# Patient Record
Sex: FEMALE | Race: BLACK OR AFRICAN AMERICAN | NOT HISPANIC OR LATINO | Employment: UNEMPLOYED | URBAN - METROPOLITAN AREA
[De-identification: names, ages, dates, MRNs, and addresses within clinical notes are randomized per-mention and may not be internally consistent; named-entity substitution may affect disease eponyms.]

---

## 2021-02-10 ENCOUNTER — TELEMEDICINE (OUTPATIENT)
Dept: OBGYN CLINIC | Facility: CLINIC | Age: 26
End: 2021-02-10
Payer: COMMERCIAL

## 2021-02-10 VITALS — WEIGHT: 190 LBS | HEIGHT: 66 IN | BODY MASS INDEX: 30.53 KG/M2

## 2021-02-10 DIAGNOSIS — Z34.02 ENCOUNTER FOR SUPERVISION OF NORMAL FIRST PREGNANCY IN SECOND TRIMESTER: Primary | ICD-10-CM

## 2021-02-10 DIAGNOSIS — Z87.898 HISTORY OF MARIJUANA USE: ICD-10-CM

## 2021-02-10 DIAGNOSIS — Z34.01 ENCOUNTER FOR SUPERVISION OF NORMAL FIRST PREGNANCY IN FIRST TRIMESTER: ICD-10-CM

## 2021-02-10 PROCEDURE — T1001 NURSING ASSESSMENT/EVALUATN: HCPCS | Performed by: NURSE PRACTITIONER

## 2021-02-10 NOTE — PROGRESS NOTES
OB INTAKE INTERVIEW    * Pt presents for OB intake  Patient is a 21 week transfer from [place]  [facility]  Has extended family in area that are supportive  FOB not involved  We have not received any records as of yet  Patient will call again  Patient states she has only had ultrasounds up until now  No blood work done during pregnancy due to lack of insurance  Currently in the process of applying for medical assistance  States ultrasounds have been normal  Does believe she had a Pap smear and Gc/Chl cultures  Has not had Level II US  Reports good fetal movement  Denies vaginal bleeding  * Accompanied by: Telephone intake  *  *Hx of  delivery prior to 36 weeks 6 days: No  *Last Menstrual Period: Pt's LMP was: 20    *Estimated date of delivery: 21   * Confirmed by: LMP per patient    *Signs/Symptoms of Pregnancy   *Current pregnancy symptoms: Feeling well  *Constipation :No   *Headaches : No   *Cramping/spotting: No   *PICA cravings :No    *Diabetes- If you answer YES to 1 of the following, please order 1 hour GTT, 50g    *History of GDM: No    *BMI >35: No    *History of PCOS and/or on Metformin: No    *Prior history of LGA/Macrosomia (>9lb):  No      -If you answer YES to 2 or more of the following, please order 1 hour GTT, 50g    *BMI >30: Yes  *First degree relative with type 2 diabetes: No    *AMA with other risk factors: No    *History of CHTN, Hyperlipidemia, Elevated A1c: No    *High risk race (, , ,  or [place]): Yes    *Hypertension- if you answer yes, please order preeclampsia labs including 24 hour urine protein   *Hx of chronic HTN: No   *Hx of gestational HTN: No   *Hx of preeclampsia, eclampsia, or HELLP syndrome: No    *Infection Screening-    *Does the pt have a hx of MRSA?:No    *If yes- please follow MRSA protocol and obtain a nasal swab for MRSA culture   *History of herpes?: No   *Ok for blood transfusion: YES    *Immunizations:   *Influenza vaccine given today: No   *Discussed Tdap vaccine: Yes    *Interview education    *[facility] support center/[facility]  org    *[facility]     *Discussed genetic testing: Yes  Patient has not had any genetic testing done  Referral placed for MFM/Formal US/Level II  *Appointment at [facility] made: Patient to call       *Routine Prenatal lab work ordered:  Yes     *Did patients risk factors prompt additional lab work at this time?: Early 1 hr Glucola, urine drug screen     *Nurse/Family Partnership- pt may qualify: Yes    *4 P's- substance abuse screening      Presently using? No    Past use? Yes, Marijuana prior to knowledge of pregnancy    Partner using? No    Parents/Family using? No    *Details that I feel the provider should be aware of: Late transfer 21 weeks  Has not had any blood work during this pregnancy  Awaiting prenatal records  FOB not involved  PN1 visit scheduled  The patient was oriented to our practice and all questions were answered  [address] [facility] for delivery  This was a telephone intake which lasted approximately 45 minutes  Appt made for MFM and PN1      Interviewed by: Belen Nicole

## 2021-02-17 ENCOUNTER — APPOINTMENT (OUTPATIENT)
Dept: LAB | Facility: HOSPITAL | Age: 26
End: 2021-02-17
Payer: COMMERCIAL

## 2021-02-17 ENCOUNTER — INITIAL PRENATAL (OUTPATIENT)
Dept: OBGYN CLINIC | Facility: CLINIC | Age: 26
End: 2021-02-17

## 2021-02-17 VITALS — BODY MASS INDEX: 37.12 KG/M2 | WEIGHT: 230 LBS | DIASTOLIC BLOOD PRESSURE: 82 MMHG | SYSTOLIC BLOOD PRESSURE: 120 MMHG

## 2021-02-17 DIAGNOSIS — Z87.898 HISTORY OF MARIJUANA USE: ICD-10-CM

## 2021-02-17 DIAGNOSIS — Z34.02 ENCOUNTER FOR SUPERVISION OF NORMAL FIRST PREGNANCY IN SECOND TRIMESTER: ICD-10-CM

## 2021-02-17 DIAGNOSIS — O26.02 EXCESSIVE WEIGHT GAIN DURING PREGNANCY IN SECOND TRIMESTER: ICD-10-CM

## 2021-02-17 DIAGNOSIS — O99.212 OBESITY AFFECTING PREGNANCY IN SECOND TRIMESTER: ICD-10-CM

## 2021-02-17 DIAGNOSIS — Z34.02 ENCOUNTER FOR SUPERVISION OF NORMAL FIRST PREGNANCY IN SECOND TRIMESTER: Primary | ICD-10-CM

## 2021-02-17 DIAGNOSIS — Z11.3 SCREEN FOR STD (SEXUALLY TRANSMITTED DISEASE): ICD-10-CM

## 2021-02-17 LAB
ABO GROUP BLD: NORMAL
AMPHETAMINES SERPL QL SCN: NEGATIVE
BACTERIA UR QL AUTO: ABNORMAL /HPF
BARBITURATES UR QL: NEGATIVE
BASOPHILS # BLD AUTO: 0.05 THOUSANDS/ΜL (ref 0–0.1)
BASOPHILS NFR BLD AUTO: 1 % (ref 0–1)
BENZODIAZ UR QL: NEGATIVE
BILIRUB UR QL STRIP: NEGATIVE
BLD GP AB SCN SERPL QL: NEGATIVE
CLARITY UR: CLEAR
COCAINE UR QL: NEGATIVE
COLOR UR: YELLOW
EOSINOPHIL # BLD AUTO: 0.16 THOUSAND/ΜL (ref 0–0.61)
EOSINOPHIL NFR BLD AUTO: 2 % (ref 0–6)
ERYTHROCYTE [DISTWIDTH] IN BLOOD BY AUTOMATED COUNT: 12.2 % (ref 11.6–15.1)
GLUCOSE 1H P 50 G GLC PO SERPL-MCNC: 85 MG/DL
GLUCOSE UR STRIP-MCNC: NEGATIVE MG/DL
HCT VFR BLD AUTO: 35 % (ref 34.8–46.1)
HGB BLD-MCNC: 11.6 G/DL (ref 11.5–15.4)
HGB UR QL STRIP.AUTO: NEGATIVE
IMM GRANULOCYTES # BLD AUTO: 0.04 THOUSAND/UL (ref 0–0.2)
IMM GRANULOCYTES NFR BLD AUTO: 1 % (ref 0–2)
KETONES UR STRIP-MCNC: NEGATIVE MG/DL
LEUKOCYTE ESTERASE UR QL STRIP: NEGATIVE
LYMPHOCYTES # BLD AUTO: 1.81 THOUSANDS/ΜL (ref 0.6–4.47)
LYMPHOCYTES NFR BLD AUTO: 23 % (ref 14–44)
MCH RBC QN AUTO: 29.4 PG (ref 26.8–34.3)
MCHC RBC AUTO-ENTMCNC: 33.1 G/DL (ref 31.4–37.4)
MCV RBC AUTO: 89 FL (ref 82–98)
METHADONE UR QL: NEGATIVE
MONOCYTES # BLD AUTO: 0.63 THOUSAND/ΜL (ref 0.17–1.22)
MONOCYTES NFR BLD AUTO: 8 % (ref 4–12)
NEUTROPHILS # BLD AUTO: 5.33 THOUSANDS/ΜL (ref 1.85–7.62)
NEUTS SEG NFR BLD AUTO: 65 % (ref 43–75)
NITRITE UR QL STRIP: NEGATIVE
NON-SQ EPI CELLS URNS QL MICRO: ABNORMAL /HPF
NRBC BLD AUTO-RTO: 0 /100 WBCS
OPIATES UR QL SCN: NEGATIVE
OXYCODONE+OXYMORPHONE UR QL SCN: NEGATIVE
PCP UR QL: NEGATIVE
PH UR STRIP.AUTO: 6 [PH]
PLATELET # BLD AUTO: 207 THOUSANDS/UL (ref 149–390)
PMV BLD AUTO: 10.7 FL (ref 8.9–12.7)
PROT UR STRIP-MCNC: NEGATIVE MG/DL
RBC # BLD AUTO: 3.94 MILLION/UL (ref 3.81–5.12)
RBC #/AREA URNS AUTO: ABNORMAL /HPF
RH BLD: POSITIVE
SL AMB  POCT GLUCOSE, UA: NORMAL
SL AMB POCT URINE PROTEIN: NORMAL
SP GR UR STRIP.AUTO: 1.02 (ref 1–1.03)
THC UR QL: NEGATIVE
UROBILINOGEN UR QL STRIP.AUTO: 0.2 E.U./DL
WBC # BLD AUTO: 8.02 THOUSAND/UL (ref 4.31–10.16)
WBC #/AREA URNS AUTO: ABNORMAL /HPF

## 2021-02-17 PROCEDURE — 80307 DRUG TEST PRSMV CHEM ANLYZR: CPT

## 2021-02-17 PROCEDURE — PNV: Performed by: NURSE PRACTITIONER

## 2021-02-17 PROCEDURE — 87086 URINE CULTURE/COLONY COUNT: CPT

## 2021-02-17 PROCEDURE — 81001 URINALYSIS AUTO W/SCOPE: CPT

## 2021-02-17 PROCEDURE — 87591 N.GONORRHOEAE DNA AMP PROB: CPT | Performed by: NURSE PRACTITIONER

## 2021-02-17 PROCEDURE — 80081 OBSTETRIC PANEL INC HIV TSTG: CPT

## 2021-02-17 PROCEDURE — 82950 GLUCOSE TEST: CPT

## 2021-02-17 PROCEDURE — 87491 CHLMYD TRACH DNA AMP PROBE: CPT | Performed by: NURSE PRACTITIONER

## 2021-02-17 PROCEDURE — 36415 COLL VENOUS BLD VENIPUNCTURE: CPT

## 2021-02-17 NOTE — PATIENT INSTRUCTIONS
Pregnancy at 23 to 26 Weeks   AMBULATORY CARE:   What changes are happening to your body: You are now close to or at the beginning of the third trimester  The third trimester starts at 24 weeks and ends with delivery  As your baby gets larger, you may develop certain symptoms  These may include pain in your back or down the sides of your abdomen  You may also have stretch marks on your abdomen, breasts, thighs, or buttocks  You may also have constipation  Seek care immediately if:   · You develop a severe headache that does not go away  · You have new or increased vision changes, such as blurred or spotted vision  · You have new or increased swelling in your face or hands  · You have vaginal spotting or bleeding  · Your water broke or you feel warm water gushing or trickling from your vagina  Contact your healthcare provider if:   · You have abdominal cramps, pressure, or tightening  · You have a change in vaginal discharge  · You have light bleeding  · You have chills or a fever  · You have vaginal itching, burning, or pain  · You have yellow, green, white, or foul-smelling vaginal discharge  · You have pain or burning when you urinate, less urine than usual, or pink or bloody urine  · You have questions or concerns about your condition or care  How to care for yourself at this stage of your pregnancy:   · Eat a variety of healthy foods  Healthy foods include fruits, vegetables, whole-grain breads, low-fat dairy foods, beans, lean meats, and fish  Drink liquids as directed  Ask how much liquid to drink each day and which liquids are best for you  Limit caffeine to less than 200 milligrams each day  Limit your intake of fish to 2 servings each week  Choose fish low in mercury such as canned light tuna, shrimp, salmon, cod, or tilapia  Do not  eat fish high in mercury such as swordfish, tilefish, flor mackerel, and shark  · Manage back pain    Do not stand for long periods of time or lift heavy items  Use good posture while you stand, squat, or bend  Wear low-heeled shoes with good support  Rest may also help to relieve back pain  Ask your healthcare provider about exercises you can do to strengthen your back muscles  · Take prenatal vitamins as directed  Your need for certain vitamins and minerals, such as folic acid, increases during pregnancy  Prenatal vitamins provide some of the extra vitamins and minerals you need  Prenatal vitamins may also help to decrease the risk of certain birth defects  · Talk to your healthcare provider about exercise  Moderate exercise can help you stay fit  Your healthcare provider will help you plan an exercise program that is safe for you during pregnancy  · Do not smoke  Smoking increases your risk of a miscarriage and other health problems during your pregnancy  Smoking can cause your baby to be born too early or weigh less at birth  Ask your healthcare provider for information if you need help quitting  · Do not drink alcohol  Alcohol passes from your body to your baby through the placenta  It can affect your baby's brain development and cause fetal alcohol syndrome (FAS)  FAS is a group of conditions that causes mental, behavior, and growth problems  · Talk to your healthcare provider before you take any medicines  Many medicines may harm your baby if you take them when you are pregnant  Do not take any medicines, vitamins, herbs, or supplements without first talking to your healthcare provider  Never use illegal or street drugs (such as marijuana or cocaine) while you are pregnant  Safety tips during pregnancy:   · Avoid hot tubs and saunas  Do not use a hot tub or sauna while you are pregnant, especially during your first trimester  Hot tubs and saunas may raise your baby's temperature and increase the risk of birth defects  · Avoid toxoplasmosis    This is an infection caused by eating raw meat or being around infected cat feces  It can cause birth defects, miscarriages, and other problems  Wash your hands after you touch raw meat  Make sure any meat is well-cooked before you eat it  Avoid raw eggs and unpasteurized milk  Use gloves or ask someone else to clean your cat's litter box while you are pregnant  Changes that are happening with your baby:  By 26 weeks, your baby will weigh about 2 pounds  Your baby will be about 10 inches long from the top of the head to the rump (baby's bottom)  Your baby's movements are much stronger now  Your baby's eyes are almost completely formed and can partially open  Your baby also sleeps and wakes up  What you need to know about prenatal care: Your healthcare provider will check your blood pressure and weight  You may also need the following:  · A urine test  may also be done to check for sugar and protein  These can be signs of gestational diabetes or infection  Protein in your urine may also be a sign of preeclampsia  Preeclampsia is a condition that can develop during week 20 or later of your pregnancy  It causes high blood pressure, and it can cause problems with your kidneys and other organs  · Fundal height  is a measurement of your uterus to check your baby's growth  This number is usually the same as the number of weeks that you have been pregnant  · Your baby's heart rate  will be checked  © Copyright [address] Information is for End User's use only and may not be sold, redistributed or otherwise used for commercial purposes  All illustrations and images included in CareNotes® are the copyrighted property of A D A M , Inc  or [facility] Dorene Burt   The above information is an  only  It is not intended as medical advice for individual conditions or treatments  Talk to your doctor, nurse or pharmacist before following any medical regimen to see if it is safe and effective for you

## 2021-02-17 NOTE — PROGRESS NOTES
Problem List Items Addressed This Visit     None      Visit Diagnoses     Encounter for supervision of normal first pregnancy in second trimester    -  Primary    Relevant Orders    Glucose, 1H PG    RPR    POCT urine dip (Completed)    Chlamydia/GC amplified DNA by PCR    Obesity affecting pregnancy in second trimester        Excessive weight gain during pregnancy in second trimester        Screen for STD (sexually transmitted disease)        Relevant Orders    Chlamydia/GC amplified DNA by PCR        Here for 1st OB appointment accompanied by HER MOM  TRANSFER FROM [place], ONLY A PAP RECEIVED  ANTE LABS AND 28 WK LABS ORDERED  Feels well  Pap 2020 NEG, GC/CT done today  Has appointment for L2 on 2/20  Denies LOF/CTX/VB  No pregnancy concerns expressed by patient  I DID DISCUSS HER TWG AND DIET/EXERCISE  SHE WILL TRY TO BE COGNIZANT  All questions answered  Oriented to our practice  DECLINES A FLU SHOT  BLUE FOLDER GIVEN TO PT AND REVIEWED

## 2021-02-18 LAB
HBV SURFACE AG SER QL: NORMAL
HIV 1+2 AB+HIV1 P24 AG SERPL QL IA: NORMAL
RPR SER QL: NORMAL
RUBV IGG SERPL IA-ACNC: >175 IU/ML

## 2021-02-19 ENCOUNTER — TELEPHONE (OUTPATIENT)
Dept: PERINATAL CARE | Facility: CLINIC | Age: 26
End: 2021-02-19

## 2021-02-19 LAB — BACTERIA UR CULT: NORMAL

## 2021-02-19 NOTE — TELEPHONE ENCOUNTER
Spoke with patient and confirmed appointment with [facility]  1 support person ( must be over age of 15) may accompany patient  Will you and your support person be able to wear a mask ,without a valve , during entire appointment? YES   To minimize your exposure in our waiting area,check in and rooming questions will be done via phone  When you arrive in the parking lot please call the following inside line # prior to entering office:    [facility]: [phone]    Have you or your support person traveled outside the state in the last 2 weeks? NO  If yes, what state did you travel to? n/a    Do you or your support person have:  Fever or flu- like symptoms? NO  Symptoms of upper respiratory infection like runny nose, sore throat or cough? NO  Do you have new headache that you have not had in the past?NO  Have you experienced any new shortness of breath recently? NO  Do you have any new loss of taste or smell? NO  Do you have any new diarrhea, nausea or vomiting? NO  Have you recently been in contact with anyone who has been sick or diagnosed with COVID-19 infection? NO  Have you been recommended to quarantine because of an exposure to a confirmed positive COVID19 person? NO  Have you recently been tested for COVID19? NO    Patient verbalized understanding of all instructions   -------------------------------------------------------------

## 2021-02-20 ENCOUNTER — ROUTINE PRENATAL (OUTPATIENT)
Dept: PERINATAL CARE | Facility: CLINIC | Age: 26
End: 2021-02-20
Payer: COMMERCIAL

## 2021-02-20 VITALS
BODY MASS INDEX: 36.67 KG/M2 | SYSTOLIC BLOOD PRESSURE: 118 MMHG | DIASTOLIC BLOOD PRESSURE: 79 MMHG | WEIGHT: 228.2 LBS | HEIGHT: 66 IN | HEART RATE: 79 BPM

## 2021-02-20 DIAGNOSIS — Z3A.24 24 WEEKS GESTATION OF PREGNANCY: ICD-10-CM

## 2021-02-20 DIAGNOSIS — Z34.02 ENCOUNTER FOR SUPERVISION OF NORMAL FIRST PREGNANCY IN SECOND TRIMESTER: ICD-10-CM

## 2021-02-20 DIAGNOSIS — O99.212 OBESITY COMPLICATING PREGNANCY IN SECOND TRIMESTER: Primary | ICD-10-CM

## 2021-02-20 PROCEDURE — 76811 OB US DETAILED SNGL FETUS: CPT | Performed by: OBSTETRICS & GYNECOLOGY

## 2021-02-20 PROCEDURE — 99241 PR OFFICE CONSULTATION NEW/ESTAB PATIENT 15 MIN: CPT | Performed by: OBSTETRICS & GYNECOLOGY

## 2021-02-20 RX ORDER — ASPIRIN 81 MG/1
162 TABLET, CHEWABLE ORAL DAILY
Qty: 60 TABLET | Refills: 6 | Status: SHIPPED | OUTPATIENT
Start: 2021-02-20 | End: 2021-06-22 | Stop reason: HOSPADM

## 2021-02-20 NOTE — Clinical Note
Patient would like to have cell free DNA screening drawn when she has insurance coverage  She is applying for medical assistance       Katherine Paniagua MD

## 2021-02-20 NOTE — LETTER
2021     [address]  [address] [address]    Patient: Trena Hopkins   YOB: 1995   Date of Visit: 2021       Dear Dr Roxene Goldmann: Thank you for referring Trena Hopkins to me for evaluation  Below are my notes for this consultation  If you have questions, please do not hesitate to call me  I look forward to following your patient along with you  Sincerely,        Lyndsey Carey MD        CC: No Recipients  Lyndsey Carey MD  2021  5:59 PM  Sign when Signing Visit  Goldy Gutierrez Md  [address]  [facility],  [facility]     Dear Dr Roxene Goldmann     Thank you for requesting a  consultation on your patient Trena Hopkins for the following indications:  Fetal anatomy  She started her prenatal care in [place]  Based on records sent she had a 7 week 5 day ultrasound that correlated with her last menstrual period  Her hospital did not send any copies of prenatal labs or genetic screening or a 20 week anatomy scan  She completed prenatal labs on 21 which we reviewed and she had a normal Glucola screen  History  Past medical history:  BMI of 36  Past surgical history:  denies  Past obstetrical history:   1 para 0  Social history:  Does not report use of cigarettes, alcohol or illicit drugs  First generation family history:  Denies hypertension, diabetes, thrombosis or congenital anomalies    Medications:  Prenatal vitamins  Allergies to medications:  denies    Ultrasound findings: her ultrasound today shows a fetus that is growing concordant with her dates  No malformations are detected however we could not complete the anatomical survey secondary to fetal position  The patient was informed of the findings and counseled about the limitations of the exam in detecting all forms of fetal congenital abnormalities      She does not report any vaginal bleeding or uterine cramping/contractions  She does feel fetal movement  BMI above 30 confers increased pregnancy risks  Maternal risks include preeclampsia, gestational diabetes, cardiac dysfunction, and sleep apnea  Fetal risks include miscarriage, fetal anomalies, and stillbirth as well as macrosomia and impaired growth  Intrapartum risks include  delivery, wound complications, and venous thrombosis  The  detection of congenital anomalies is reduced with increasing maternal BMI  Baby aspirin 162 milligrams orally taken till delivery has been associated with a lower risk of developing preeclampsia in pregnancy  We discussed the option of genetic screening which she is interested in but currently does not have insurance  She is applying for medical assistance  At this gestational age the option for genetic screening includes NIPT which can screen for Down syndrome, trisomy 25,  trisomy 15 and sex chromosome issues  Follow up recommended:   Recommend a follow-up ultrasound for growth and missed anatomy at 32 weeks  She is aware to call our office when she gets her insurance coverage and she was given our office phone number  A prescription for baby aspirin 162 milligrams daily was sent to her pharmacy  The majority of time (greater then 50%) was spent on counseling and coordination of care of this patient and /or family member  Approximate face to face time was 15 minutes              Shahla Gates MD

## 2021-02-21 LAB
C TRACH DNA SPEC QL NAA+PROBE: NEGATIVE
N GONORRHOEA DNA SPEC QL NAA+PROBE: NEGATIVE

## 2021-02-23 ENCOUNTER — TELEPHONE (OUTPATIENT)
Dept: OBGYN CLINIC | Age: 26
End: 2021-02-23

## 2021-02-23 NOTE — TELEPHONE ENCOUNTER
----- Message from Kate Recinos, [address] sent at 2/22/2021  9:31 AM EST -----  Please notify patient her GC/CT results were normal  Thanks

## 2021-03-23 NOTE — PATIENT INSTRUCTIONS
Pregnancy at [address] to [address]:   What changes are happening in my body? You are considered full term at the beginning of 37 weeks  Your breathing may be easier if your baby has moved down into a head-down position  You may need to urinate more often because the baby may be pressing on your bladder  You may also feel more discomfort and get tired easily  How do I care for myself at this stage of my pregnancy? · Eat a variety of healthy foods  Healthy foods include fruits, vegetables, whole-grain breads, low-fat dairy foods, beans, lean meats, and fish  Drink liquids as directed  Ask how much liquid to drink each day and which liquids are best for you  Limit caffeine to less than 200 milligrams each day  Limit your intake of fish to 2 servings each week  Choose fish low in mercury such as canned light tuna, shrimp, salmon, cod, or tilapia  Do not  eat fish high in mercury such as swordfish, tilefish, flor mackerel, and shark  · Take prenatal vitamins as directed  Your need for certain vitamins and minerals, such as folic acid, increases during pregnancy  Prenatal vitamins provide some of the extra vitamins and minerals you need  Prenatal vitamins may also help to decrease the risk of certain birth defects  · Rest as needed  Put your feet up if you have swelling in your ankles and feet  · Do not smoke  Smoking increases your risk of a miscarriage and other health problems during your pregnancy  Smoking can cause your baby to be born early or weigh less at birth  Ask your healthcare provider for information if you need help quitting  · Do not drink alcohol  Alcohol passes from your body to your baby through the placenta  It can affect your baby's brain development and cause fetal alcohol syndrome (FAS)  FAS is a group of conditions that causes mental, behavior, and growth problems  · Talk to your healthcare provider before you take any medicines    Many medicines may harm your baby if you take them when you are pregnant  Do not take any medicines, vitamins, herbs, or supplements without first talking to your healthcare provider  Never use illegal or street drugs (such as marijuana or cocaine) while you are pregnant  · Talk to your healthcare provider before you travel  You may not be able to travel in an airplane after 36 weeks  He may also recommend that you avoid long road trips  What are some safety tips during pregnancy? · Avoid hot tubs and saunas  Do not use a hot tub or sauna while you are pregnant, especially during your first trimester  Hot tubs and saunas may raise your baby's temperature and increase the risk of birth defects  · Avoid toxoplasmosis  This is an infection caused by eating raw meat or being around infected cat feces  It can cause birth defects, miscarriages, and other problems  Wash your hands after you touch raw meat  Make sure any meat is well-cooked before you eat it  Avoid raw eggs and unpasteurized milk  Use gloves or ask someone else to clean your cat's litter box while you are pregnant  · Ask your healthcare provider about travel  The most comfortable time to travel is during the second trimester  Ask your healthcare provider if you can travel after 36 weeks  You may not be able to travel in an airplane after 36 weeks  He may also recommend that you avoid long road trips  What changes are happening with my baby? By 38 weeks, your baby may weigh between 6 and 9 pounds  Your baby may be about 14 inches long from the top of the head to the rump (baby's bottom)  Your baby hears well enough to know your voice  As your baby gets larger, you may feel fewer kicks and more stretching and rolling  Your baby may move into a head-down position  Your baby will also rest lower in your abdomen  What do I need to know about prenatal care? Your healthcare provider will check your blood pressure and weight   You may also need the following:  · A urine test  may also be done to check for sugar and protein  These can be signs of gestational diabetes or infection  Protein in your urine may also be a sign of preeclampsia  Preeclampsia is a condition that can develop during week 20 or later of your pregnancy  It causes high blood pressure, and it can cause problems with your kidneys and other organs  · A blood test  may be done to check for anemia (low iron level)  · A Tdap vaccine  may be recommended by your healthcare provider  · A group B strep test  is a test that is done to check for group B strep infection  Group B strep is a type of bacteria that may be found in the vagina or rectum  It can be passed to your baby during delivery if you have it  Your healthcare provider will take swab your vagina or rectum and send the sample to the lab for tests  · Fundal height  is a measurement of your uterus to check your baby's growth  This number is usually the same as the number of weeks that you have been pregnant  Your healthcare provider may also check your baby's position  · Your baby's heart rate  will be checked  When should I seek immediate care? · You develop a severe headache that does not go away  · You have new or increased vision changes, such as blurred or spotted vision  · You have new or increased swelling in your face or hands  · You have vaginal spotting or bleeding  · Your water broke or you feel warm water gushing or trickling from your vagina  When should I contact my healthcare provider? · You have more than 5 contractions in 1 hour  · You notice any changes in your baby's movements  · You have abdominal cramps, pressure, or tightening  · You have a change in vaginal discharge  · You have chills or a fever  · You have vaginal itching, burning, or pain  · You have yellow, green, white, or foul-smelling vaginal discharge      · You have pain or burning when you urinate, less urine than usual, or pink or bloody urine  · You have questions or concerns about your condition or care  CARE AGREEMENT:   You have the right to help plan your care  Learn about your health condition and how it may be treated  Discuss treatment options with your healthcare providers to decide what care you want to receive  You always have the right to refuse treatment  The above information is an  only  It is not intended as medical advice for individual conditions or treatments  Talk to your doctor, nurse or pharmacist before following any medical regimen to see if it is safe and effective for you  © Copyright [address] [place] [address] Information is for End User's use only and may not be sold, redistributed or otherwise used for commercial purposes   All illustrations and images included in CareNotes® are the copyrighted property of A D A M , Inc  or [address] [place] [address]

## 2021-03-24 ENCOUNTER — ROUTINE PRENATAL (OUTPATIENT)
Dept: OBGYN CLINIC | Age: 26
End: 2021-03-24
Payer: COMMERCIAL

## 2021-03-24 DIAGNOSIS — Z34.02 ENCOUNTER FOR SUPERVISION OF NORMAL FIRST PREGNANCY IN SECOND TRIMESTER: Primary | ICD-10-CM

## 2021-03-24 DIAGNOSIS — Z23 NEED FOR TDAP VACCINATION: ICD-10-CM

## 2021-03-24 DIAGNOSIS — R21 SKIN RASH: ICD-10-CM

## 2021-03-24 PROCEDURE — 99213 OFFICE O/P EST LOW 20 MIN: CPT | Performed by: NURSE PRACTITIONER

## 2021-03-24 PROCEDURE — 90471 IMMUNIZATION ADMIN: CPT

## 2021-03-24 PROCEDURE — 90715 TDAP VACCINE 7 YRS/> IM: CPT

## 2021-03-24 RX ORDER — NYSTATIN 100000 U/G
CREAM TOPICAL 2 TIMES DAILY
Qty: 30 G | Refills: 2 | Status: SHIPPED | OUTPATIENT
Start: 2021-03-24 | End: 2021-07-12 | Stop reason: ALTCHOICE

## 2021-03-24 NOTE — ASSESSMENT & PLAN NOTE
Return OB  Denies LOF, vag blding, ctxs, cramping and reports good FM  Taking PNVASA daily as prescribed  Pt c/o itchy rash on her chest, spreading more over the past few days  Flat rash  Nystatin ordered  Will need Tdap @ 28 wks/ FLU vaccine/ "Last Weeks of Pregnancy" & Perineal Massage sheets reviewed and given @ 35 weeks  Reviewed PTL precautions, [address] also reviewed

## 2021-03-24 NOTE — PROGRESS NOTES
Need for Tdap vaccination  Discussed with patient and the benefits, contraindications and side effects of the following vaccines:  TDAP  Discussed all components of the vaccine  Encounter for supervision of normal first pregnancy in second trimester  Return OB  Denies LOF, vag blding, ctxs, cramping and reports good FM  Taking PNVASA daily as prescribed  Pt c/o itchy rash on her chest, spreading more over the past few days  Flat rash  Nystatin ordered  Will need Tdap @ 28 wks/ FLU vaccine/ "Last Weeks of Pregnancy" & Perineal Massage sheets reviewed and given @ 35 weeks  Reviewed PTL precautions, [address] also reviewed

## 2021-03-24 NOTE — PROGRESS NOTES
28 wk 4 d   Denies any LOF, VB or CTX  + FM  Baby Boy  Chest itching and dryness  Patient unable to void  T-dap administered on right deltoid, patient tolerated well

## 2021-03-31 ENCOUNTER — TELEMEDICINE (OUTPATIENT)
Dept: OBGYN CLINIC | Age: 26
End: 2021-03-31
Payer: COMMERCIAL

## 2021-03-31 VITALS — WEIGHT: 240 LBS | BODY MASS INDEX: 38.74 KG/M2

## 2021-03-31 DIAGNOSIS — Z34.03 ENCOUNTER FOR SUPERVISION OF NORMAL FIRST PREGNANCY IN THIRD TRIMESTER: Primary | ICD-10-CM

## 2021-03-31 DIAGNOSIS — O26.03 EXCESSIVE WEIGHT GAIN DURING PREGNANCY IN THIRD TRIMESTER: ICD-10-CM

## 2021-03-31 DIAGNOSIS — O99.213 OBESITY AFFECTING PREGNANCY IN THIRD TRIMESTER: ICD-10-CM

## 2021-03-31 DIAGNOSIS — R21 SKIN RASH: ICD-10-CM

## 2021-03-31 PROCEDURE — 99213 OFFICE O/P EST LOW 20 MIN: CPT | Performed by: NURSE PRACTITIONER

## 2021-03-31 NOTE — PATIENT INSTRUCTIONS
Pregnancy at 32 to 30 Weeks   AMBULATORY CARE:   What changes are happening to your body: You may notice new symptoms such as shortness of breath, heartburn, or swelling of your ankles and feet  You may also have trouble sleeping or contractions  Seek care immediately if:   · You develop a severe headache that does not go away  · You have new or increased vision changes, such as blurred or spotted vision  · You have new or increased swelling in your face or hands  · You have vaginal spotting or bleeding  · Your water broke or you feel warm water gushing or trickling from your vagina  Contact your healthcare provider if:   · You have more than 5 contractions in 1 hour  · You notice any changes in your baby's movements  · You have abdominal cramps, pressure, or tightening  · You have a change in vaginal discharge  · You have chills or a fever  · You have vaginal itching, burning, or pain  · You have yellow, green, white, or foul-smelling vaginal discharge  · You have pain or burning when you urinate, less urine than usual, or pink or bloody urine  · You have questions or concerns about your condition or care  How to care for yourself at this stage of your pregnancy:   · Eat a variety of healthy foods  Healthy foods include fruits, vegetables, whole-grain breads, low-fat dairy foods, beans, lean meats, and fish  Drink liquids as directed  Ask how much liquid to drink each day and which liquids are best for you  Limit caffeine to less than 200 milligrams each day  Limit your intake of fish to 2 servings each week  Choose fish low in mercury such as canned light tuna, shrimp, salmon, cod, or tilapia  Do not  eat fish high in mercury such as swordfish, tilefish, lfor mackerel, and shark  · Manage heartburn  by eating 4 or 5 small meals each day instead of large meals  Avoid spicy food  · Manage swelling  by lying down and putting your feet up           · Take prenatal vitamins as directed  Your need for certain vitamins and minerals, such as folic acid, increases during pregnancy  Prenatal vitamins provide some of the extra vitamins and minerals you need  Prenatal vitamins may also help to decrease the risk of certain birth defects  · Talk to your healthcare provider about exercise  Moderate exercise can help you stay fit  Your healthcare provider will help you plan an exercise program that is safe for you during pregnancy  · Do not smoke  Smoking increases your risk of a miscarriage and other health problems during your pregnancy  Smoking can cause your baby to be born too early or weigh less at birth  Ask your healthcare provider for information if you need help quitting  · Do not drink alcohol  Alcohol passes from your body to your baby through the placenta  It can affect your baby's brain development and cause fetal alcohol syndrome (FAS)  FAS is a group of conditions that causes mental, behavior, and growth problems  · Talk to your healthcare provider before you take any medicines  Many medicines may harm your baby if you take them when you are pregnant  Do not take any medicines, vitamins, herbs, or supplements without first talking to your healthcare provider  Never use illegal or street drugs (such as marijuana or cocaine) while you are pregnant  Safety tips during pregnancy:   · Avoid hot tubs and saunas  Do not use a hot tub or sauna while you are pregnant, especially during your first trimester  Hot tubs and saunas may raise your baby's temperature and increase the risk of birth defects  · Avoid toxoplasmosis  This is an infection caused by eating raw meat or being around infected cat feces  It can cause birth defects, miscarriages, and other problems  Wash your hands after you touch raw meat  Make sure any meat is well-cooked before you eat it  Avoid raw eggs and unpasteurized milk   Use gloves or ask someone else to clean your cat's litter box while you are pregnant  Changes that are happening with your baby:  By 30 weeks, your baby may weigh more than 3 pounds  Your baby may be about 11 inches long from the top of the head to the rump (baby's bottom)  Your baby's eyes open and close now  Your baby's kicks and movements are more forceful at this time  What you need to know about prenatal care: Your healthcare provider will check your blood pressure and weight  You may also need the following:  · Blood tests  may be done to check for anemia or blood type  · A urine test  may also be done to check for sugar and protein  These can be signs of gestational diabetes or infection  Protein in your urine may also be a sign of preeclampsia  Preeclampsia is a condition that can develop during week 20 or later of your pregnancy  It causes high blood pressure, and it can cause problems with your kidneys and other organs  · A Tdap vaccine and flu vaccine  may be recommended by your healthcare provider  · A gestational diabetes screen  will be done using an oral glucose tolerance test (OGTT)  An OGTT starts with a blood sugar level check after you have not eaten for 8 hours  You are then given a glucose drink  Your blood sugar level is checked after 1 hour, 2 hours, and sometimes 3 hours  Healthcare providers look at how much your blood sugar level increases from the first check  · Fundal height  is a measurement of your uterus to check your baby's growth  This number is usually the same as the number of weeks that you have been pregnant  Your healthcare provider may also check your baby's position  · Your baby's heart rate  will be checked  © Copyright [address] Information is for End User's use only and may not be sold, redistributed or otherwise used for commercial purposes   All illustrations and images included in CareNotes® are the copyrighted property of A D A M [employer] Inc  or Jenni Matias  The above information is an  only  It is not intended as medical advice for individual conditions or treatments  Talk to your doctor, nurse or pharmacist before following any medical regimen to see if it is safe and effective for you

## 2021-03-31 NOTE — PROGRESS NOTES
Virtual Regular Visit      Assessment/Plan:    Problem List Items Addressed This Visit        Musculoskeletal and Integument    Skin rash      Other Visit Diagnoses     Encounter for supervision of normal first pregnancy in third trimester    -  Primary    Excessive weight gain during pregnancy in third trimester        Obesity affecting pregnancy in third trimester                   Reason for visit is   Chief Complaint   Patient presents with    Virtual Regular Visit        Encounter provider DARSHANA Allen    Provider located at [address] [place] [address] [facility]  [phone]      Recent Visits  No visits were found meeting these conditions  Showing recent visits within past 7 days and meeting all other requirements     Today's Visits  Date Type Provider Dept   21 Telemedicine DARSHANA Allen  Ob/Gyn [facility]   Showing today's visits and meeting all other requirements     Future Appointments  No visits were found meeting these conditions  Showing future appointments within next 150 days and meeting all other requirements        The patient was identified by name and date of birth  Jeannine Mantle was informed that this is a telemedicine visit and that the visit is being conducted through [employer] and patient was informed that this is a secure, HIPAA-compliant platform  She agrees to proceed     Other methods to assure confidentiality were taken , no one in my office with me  No one else was in the room  She acknowledged consent and understanding of privacy and security of the video platform  The patient has agreed to participate and understands they can discontinue the visit at any time  Patient is aware this is a billable service  Mickie Bolaños is a 27 y o  female    Feels well  Denies LOF/CTX/VB  No concerns   Discussed fetal kick counting  FG appt on 4/20  Rash is improving with antifungal cream  Discussed weight gain  Will try walking more   History reviewed  No pertinent past medical history  History reviewed  No pertinent surgical history  Current Outpatient Medications   Medication Sig Dispense Refill    aspirin 81 mg chewable tablet Chew 2 tablets (162 mg total) daily 60 tablet 6    nystatin (MYCOSTATIN) cream Apply topically 2 (two) times a day 30 g 2    Prenatal MV-Min-Fe Fum-FA-DHA (PRENATAL 1 PO) Take 1 tablet by mouth       No current facility-administered medications for this visit  No Known Allergies    Review of Systems   Constitutional: Negative for activity change, chills, fatigue, fever and unexpected weight change  HENT: Negative for mouth sores and trouble swallowing  Respiratory: Negative for shortness of breath  Gastrointestinal: Negative for anal bleeding, blood in stool, constipation and diarrhea  Genitourinary: Negative for difficulty urinating, dysuria, genital sores and hematuria  Neurological: Negative for weakness  Psychiatric/Behavioral: Negative for confusion and self-injury  Video Exam    Vitals:    03/31/21 1229   Weight: 109 kg (240 lb)       Physical Exam  Constitutional:       General: She is not in acute distress  Appearance: She is well-developed  HENT:      Head: Normocephalic  Neck:      Musculoskeletal: Normal range of motion  Pulmonary:      Effort: Pulmonary effort is normal  No respiratory distress  Musculoskeletal: Normal range of motion  Neurological:      Mental Status: She is alert and oriented to person, place, and time  Psychiatric:         Behavior: Behavior normal          Thought Content:  Thought content normal           I spent 10 minutes with patient today in which greater than 50% of the time was spent in counseling/coordination of care regarding OB care      VIRTUAL VISIT DISCLAIMER    Martha Valentino acknowledges that she has consented to an online visit or consultation  She understands that the online visit is based solely on information provided by her, and that, in the absence of a face-to-face physical evaluation by the physician, the diagnosis she receives is both limited and provisional in terms of accuracy and completeness  This is not intended to replace a full medical face-to-face evaluation by the physician  Mitra Celso understands and accepts these terms

## 2021-04-13 ENCOUNTER — ROUTINE PRENATAL (OUTPATIENT)
Dept: OBGYN CLINIC | Facility: CLINIC | Age: 26
End: 2021-04-13
Payer: COMMERCIAL

## 2021-04-13 VITALS — DIASTOLIC BLOOD PRESSURE: 84 MMHG | BODY MASS INDEX: 40.48 KG/M2 | WEIGHT: 250.8 LBS | SYSTOLIC BLOOD PRESSURE: 136 MMHG

## 2021-04-13 DIAGNOSIS — Z34.02 ENCOUNTER FOR SUPERVISION OF NORMAL FIRST PREGNANCY IN SECOND TRIMESTER: ICD-10-CM

## 2021-04-13 DIAGNOSIS — Z34.93 PREGNANCY, OBSTETRICAL CARE, THIRD TRIMESTER: Primary | ICD-10-CM

## 2021-04-13 PROBLEM — R21 SKIN RASH: Status: RESOLVED | Noted: 2021-03-24 | Resolved: 2021-04-13

## 2021-04-13 LAB
SL AMB  POCT GLUCOSE, UA: NORMAL
SL AMB POCT URINE PROTEIN: NORMAL

## 2021-04-13 PROCEDURE — 99213 OFFICE O/P EST LOW 20 MIN: CPT | Performed by: STUDENT IN AN ORGANIZED HEALTH CARE EDUCATION/TRAINING PROGRAM

## 2021-04-13 NOTE — PROGRESS NOTES
Encounter for supervision of normal first pregnancy in second trimester  26 yo  at 31+3 here for routine visit  She is feeling well  No contractions, leaking or bleeding  Good fetal movement  S/p tdap  Having seasonal allergies and meds safe in pregnancy reviewed  Follow up [address] to anatomy   Return in 2 wks  Discussed contraception- no plans for more babies, reviewed prog only for breast feeding and LARCS  Undecided but plans breast and bottle feeding  TWG 60#, discussed goal 11-20  Eating healthy- walking 40 mins a day and encouraged continuing this  Discussed risks of excess wt gain in pregnancy  Will need  testing for BMI >40

## 2021-04-13 NOTE — ASSESSMENT & PLAN NOTE
28 yo  at 31+3 here for routine visit  She is feeling well  No contractions, leaking or bleeding  Good fetal movement  S/p tdap  Having seasonal allergies and meds safe in pregnancy reviewed  Follow up [address] to anatomy   Return in 2 wks  Discussed contraception- no plans for more babies, reviewed prog only for breast feeding and LARCS  Undecided but plans breast and bottle feeding  TWG 60#, discussed goal 11-20  Eating healthy- walking 40 mins a day and encouraged continuing this  Discussed risks of excess wt gain in pregnancy  Will need  testing for BMI >40

## 2021-04-13 NOTE — PROGRESS NOTES
Pt presents for routine prenatal visit  Denies any bleeding, cramping, LOF  +FM   Urine -/-   Up to date on tdap vaccine   28 week labs done

## 2021-04-20 ENCOUNTER — ULTRASOUND (OUTPATIENT)
Dept: PERINATAL CARE | Facility: OTHER | Age: 26
End: 2021-04-20
Payer: COMMERCIAL

## 2021-04-20 VITALS
SYSTOLIC BLOOD PRESSURE: 121 MMHG | WEIGHT: 252 LBS | BODY MASS INDEX: 40.5 KG/M2 | DIASTOLIC BLOOD PRESSURE: 77 MMHG | HEIGHT: 66 IN

## 2021-04-20 DIAGNOSIS — Z3A.32 32 WEEKS GESTATION OF PREGNANCY: ICD-10-CM

## 2021-04-20 DIAGNOSIS — O99.210 OBESITY AFFECTING PREGNANCY, ANTEPARTUM: Primary | ICD-10-CM

## 2021-04-20 PROCEDURE — 99213 OFFICE O/P EST LOW 20 MIN: CPT | Performed by: OBSTETRICS & GYNECOLOGY

## 2021-04-20 PROCEDURE — 76816 OB US FOLLOW-UP PER FETUS: CPT | Performed by: OBSTETRICS & GYNECOLOGY

## 2021-04-20 NOTE — PROGRESS NOTES
The patient was seen today for an ultrasound  Please see ultrasound report (located under Ob Procedures) for additional details  Thank you very much for allowing us to participate in the care of this very nice patient  Should you have any questions, please do not hesitate to contact me  Paulo Santos MD [id number]  Attending Physician, [facility]

## 2021-04-20 NOTE — PATIENT INSTRUCTIONS
Kick Counts in Pregnancy   AMBULATORY CARE:   Kick counts  measure how much your baby is moving in your womb  A kick from your baby can be felt as a twist, turn, swish, roll, or jab  It is common to feel your baby kicking at 26 to 28 weeks of pregnancy  You may feel your baby kick as early as 20 weeks of pregnancy  Seek care immediately if:   · You feel your baby kick less as the day goes on      · You do not feel any kicks in a day  Contact your healthcare provider if:   · You feel a change in the number of kicks or movements of your baby  · You feel fewer than 10 kicks within 2 hours after counting  · You have questions or concerns about your baby's movements  Why measure kick counts:  Your baby's movement may provide information about your baby's health  He may move less, or not at all, if there are problems  He may move less if he does not have enough room to grow in your uterus (womb)  He may also move less if he is not getting enough oxygen or nutrition from the placenta  Tell your healthcare provider as soon as you feel a change in your baby's movements  Problems that are found earlier are easier to treat  When to measure kick counts:   · Measure kick counts at the same time every day  · Measure kick counts when your baby is awake and most active  Your baby may be most active in the evening  · Measure kick counts after a meal or snack or when your baby is usually most active  Your baby may be more active after you eat or in the evening  · You should not smoke while you are pregnant  Smoking increases the risk of health problems for you and for your baby during your pregnancy  If you do smoke, wait 2 hours to measure kick counts  Nicotine can make your baby more active than usual   How to measure kick counts:  Check that your baby is awake before you measure kick counts  You can wake up your baby by lightly pushing on your belly, walking, or drinking something cold   Your healthcare provider may tell you different ways to measure kick counts  He/She may tell you to do the following:  · Use a chart or clock to keep track of the time you start and finish counting  · Sit in a chair or lie on your left side  · Place your hands on the largest part of your belly  · Count until you reach 10 kicks  Write down how much time it takes to count 10 kicks  · It may take 30 minutes to 2 hours to count 10 kicks  It should not take more than 2 hours to count 10 kicks  If you do not feel 10 kicks within 2 hours, Call your Obstetrician    Follow up with your healthcare provider as directed:  Write down your questions so you remember to ask them during your visits  © 2017 [address] Information is for End User's use only and may not be sold, redistributed or otherwise used for commercial purposes  All illustrations and images included in CareNotes® are the copyrighted property of A D A M , Inc  or Travon Luis  The above information is an  only  It is not intended as medical advice for individual conditions or treatments  Talk to your doctor, nurse or pharmacist before following any medical regimen to see if it is safe and effective for you

## 2021-04-26 ENCOUNTER — ROUTINE PRENATAL (OUTPATIENT)
Dept: OBGYN CLINIC | Facility: CLINIC | Age: 26
End: 2021-04-26
Payer: COMMERCIAL

## 2021-04-26 VITALS — SYSTOLIC BLOOD PRESSURE: 118 MMHG | WEIGHT: 254 LBS | BODY MASS INDEX: 41 KG/M2 | DIASTOLIC BLOOD PRESSURE: 70 MMHG

## 2021-04-26 DIAGNOSIS — Z34.93 PREGNANCY, OBSTETRICAL CARE, THIRD TRIMESTER: Primary | ICD-10-CM

## 2021-04-26 PROBLEM — Z34.02 ENCOUNTER FOR SUPERVISION OF NORMAL FIRST PREGNANCY IN SECOND TRIMESTER: Status: RESOLVED | Noted: 2021-03-24 | Resolved: 2021-04-26

## 2021-04-26 LAB
SL AMB  POCT GLUCOSE, UA: NORMAL
SL AMB POCT URINE PROTEIN: NORMAL

## 2021-04-26 PROCEDURE — 99213 OFFICE O/P EST LOW 20 MIN: CPT | Performed by: OBSTETRICS & GYNECOLOGY

## 2021-04-26 NOTE — PATIENT INSTRUCTIONS
Pregnancy at 31 to [address]:   What changes are happening in my body? You may continue to have symptoms such as shortness of breath, heartburn, contractions, or swelling of your ankles and feet  You may be gaining about 1 pound a week now  How do I care for myself at this stage of my pregnancy? · Eat a variety of healthy foods  Healthy foods include fruits, vegetables, whole-grain breads, low-fat dairy foods, beans, lean meats, and fish  Drink liquids as directed  Ask how much liquid to drink each day and which liquids are best for you  Limit caffeine to less than 200 milligrams each day  Limit your intake of fish to 2 servings each week  Choose fish low in mercury such as canned light tuna, shrimp, salmon, cod, or tilapia  Do not  eat fish high in mercury such as swordfish, tilefish, flor mackerel, and shark  · Manage heartburn  by eating 4 or 5 small meals each day instead of large meals  Avoid spicy food  · Manage swelling  by lying down and putting your feet up  · Take prenatal vitamins as directed  Your need for certain vitamins and minerals, such as folic acid, increases during pregnancy  Prenatal vitamins provide some of the extra vitamins and minerals you need  Prenatal vitamins may also help to decrease the risk of certain birth defects  · Talk to your healthcare provider about exercise  Moderate exercise can help you stay fit  Your healthcare provider will help you plan an exercise program that is safe for you during pregnancy  · Do not smoke  Smoking increases your risk of a miscarriage and other health problems during your pregnancy  Smoking can cause your baby to be born too early or weigh less at birth  Ask your healthcare provider for information if you need help quitting  · Do not drink alcohol  Alcohol passes from your body to your baby through the placenta   It can affect your baby's brain development and cause fetal alcohol syndrome (FAS)  FAS is a group of conditions that causes mental, behavior, and growth problems  · Talk to your healthcare provider before you take any medicines  Many medicines may harm your baby if you take them when you are pregnant  Do not take any medicines, vitamins, herbs, or supplements without first talking to your healthcare provider  Never use illegal or street drugs (such as marijuana or cocaine) while you are pregnant  What are some safety tips during pregnancy? · Avoid hot tubs and saunas  Do not use a hot tub or sauna while you are pregnant, especially during your first trimester  Hot tubs and saunas may raise your baby's temperature and increase the risk of birth defects  · Avoid toxoplasmosis  This is an infection caused by eating raw meat or being around infected cat feces  It can cause birth defects, miscarriages, and other problems  Wash your hands after you touch raw meat  Make sure any meat is well-cooked before you eat it  Avoid raw eggs and unpasteurized milk  Use gloves or ask someone else to clean your cat's litter box while you are pregnant  What changes are happening with my baby? By 34 weeks, your baby may weigh more than 5 pounds  Your baby will be about 12 ½ inches long from the top of the head to the rump (baby's bottom)  Your baby is gaining about ½ pound a week  Your baby's eyes open and close now  Your baby's kicks and movements are more forceful at this time  What do I need to know about prenatal care? Your healthcare provider will check your blood pressure and weight  You may also need the following:  · A urine test  may also be done to check for sugar and protein  These can be signs of gestational diabetes or infection  Protein in your urine may also be a sign of preeclampsia  Preeclampsia is a condition that can develop during week 20 or later of your pregnancy  It causes high blood pressure, and it can cause problems with your kidneys and other organs       · A Tdap vaccine  may be recommended by your healthcare provider  · Fundal height  is a measurement of your uterus to check your baby's growth  This number is usually the same as the number of weeks that you have been pregnant  Your healthcare provider may also check your baby's position  · Your baby's heart rate  will be checked  When should I seek immediate care? · You develop a severe headache that does not go away  · You have new or increased vision changes, such as blurred or spotted vision  · You have new or increased swelling in your face or hands  · You have vaginal spotting or bleeding  · Your water broke or you feel warm water gushing or trickling from your vagina  When should I contact my healthcare provider? · You have more than 5 contractions in 1 hour  · You notice any changes in your baby's movements  · You have abdominal cramps, pressure, or tightening  · You have a change in vaginal discharge  · You have chills or a fever  · You have vaginal itching, burning, or pain  · You have yellow, green, white, or foul-smelling vaginal discharge  · You have pain or burning when you urinate, less urine than usual, or pink or bloody urine  · You have questions or concerns about your condition or care  CARE AGREEMENT:   You have the right to help plan your care  Learn about your health condition and how it may be treated  Discuss treatment options with your healthcare providers to decide what care you want to receive  You always have the right to refuse treatment  The above information is an  only  It is not intended as medical advice for individual conditions or treatments  Talk to your doctor, nurse or pharmacist before following any medical regimen to see if it is safe and effective for you  © Copyright [address] Information is for End User's use only and may not be sold, redistributed or otherwise used for commercial purposes   All illustrations and images included in CareNotes® are the copyrighted property of A D A M , Inc  or Jenni Matias

## 2021-04-26 NOTE — PROGRESS NOTES
Problem List Items Addressed This Visit        Other    Pregnancy, obstetrical care, third trimester - Primary     33w    Signs of labor and fetal kick counts reviewed    L&D consent signed    Has received tdap         Relevant Orders    POCT urine dip (Completed)

## 2021-04-26 NOTE — PROGRESS NOTES
Patient presents for a routine prenatal visit    33W 2D  Good Fetal Movement  No LOF,bleeding,  or discharge  Patient c/o of some cramping  Urine-Neg  UTD Tdap  Labor and delivery consent reviewed and signed by patient at today's visit

## 2021-05-17 ENCOUNTER — ROUTINE PRENATAL (OUTPATIENT)
Dept: OBGYN CLINIC | Facility: CLINIC | Age: 26
End: 2021-05-17
Payer: COMMERCIAL

## 2021-05-17 ENCOUNTER — APPOINTMENT (OUTPATIENT)
Dept: LAB | Facility: HOSPITAL | Age: 26
End: 2021-05-17
Payer: COMMERCIAL

## 2021-05-17 VITALS — SYSTOLIC BLOOD PRESSURE: 126 MMHG | WEIGHT: 256 LBS | BODY MASS INDEX: 41.32 KG/M2 | DIASTOLIC BLOOD PRESSURE: 88 MMHG

## 2021-05-17 DIAGNOSIS — L29.9 PRURITUS: ICD-10-CM

## 2021-05-17 DIAGNOSIS — O99.213 OBESITY AFFECTING PREGNANCY IN THIRD TRIMESTER: ICD-10-CM

## 2021-05-17 DIAGNOSIS — Z34.93 PREGNANCY, OBSTETRICAL CARE, THIRD TRIMESTER: Primary | ICD-10-CM

## 2021-05-17 DIAGNOSIS — O26.03 EXCESSIVE WEIGHT GAIN DURING PREGNANCY IN THIRD TRIMESTER: ICD-10-CM

## 2021-05-17 PROBLEM — O99.210 OBESITY AFFECTING PREGNANCY, ANTEPARTUM: Status: RESOLVED | Noted: 2021-04-20 | Resolved: 2021-05-17

## 2021-05-17 LAB
ALBUMIN SERPL BCP-MCNC: 2.7 G/DL (ref 3.5–5)
ALP SERPL-CCNC: 117 U/L (ref 46–116)
ALT SERPL W P-5'-P-CCNC: 18 U/L (ref 12–78)
ANION GAP SERPL CALCULATED.3IONS-SCNC: 9 MMOL/L (ref 4–13)
AST SERPL W P-5'-P-CCNC: 19 U/L (ref 5–45)
BILIRUB SERPL-MCNC: 0.19 MG/DL (ref 0.2–1)
BUN SERPL-MCNC: 12 MG/DL (ref 5–25)
CALCIUM ALBUM COR SERPL-MCNC: 9.8 MG/DL (ref 8.3–10.1)
CALCIUM SERPL-MCNC: 8.8 MG/DL (ref 8.3–10.1)
CHLORIDE SERPL-SCNC: 104 MMOL/L (ref 100–108)
CO2 SERPL-SCNC: 25 MMOL/L (ref 21–32)
CREAT SERPL-MCNC: 0.72 MG/DL (ref 0.6–1.3)
GFR SERPL CREATININE-BSD FRML MDRD: 136 ML/MIN/1.73SQ M
GLUCOSE SERPL-MCNC: 84 MG/DL (ref 65–140)
POTASSIUM SERPL-SCNC: 4.1 MMOL/L (ref 3.5–5.3)
PROT SERPL-MCNC: 7.7 G/DL (ref 6.4–8.2)
SL AMB  POCT GLUCOSE, UA: NORMAL
SL AMB POCT URINE PROTEIN: NORMAL
SODIUM SERPL-SCNC: 138 MMOL/L (ref 136–145)

## 2021-05-17 PROCEDURE — 87150 DNA/RNA AMPLIFIED PROBE: CPT | Performed by: NURSE PRACTITIONER

## 2021-05-17 PROCEDURE — 99213 OFFICE O/P EST LOW 20 MIN: CPT | Performed by: NURSE PRACTITIONER

## 2021-05-17 PROCEDURE — 36415 COLL VENOUS BLD VENIPUNCTURE: CPT

## 2021-05-17 PROCEDURE — 80053 COMPREHEN METABOLIC PANEL: CPT

## 2021-05-17 PROCEDURE — 82240 BILE ACIDS CHOLYLGLYCINE: CPT

## 2021-05-17 NOTE — PATIENT INSTRUCTIONS
Pregnancy at 28 to 38 Weeks   AMBULATORY CARE:   What changes are happening to your body: You are considered full term at the beginning of 37 weeks  Your breathing may be easier if your baby has moved down into a head-down position  You may need to urinate more often because the baby may be pressing on your bladder  You may also feel more discomfort and get tired easily  Seek care immediately if:   · You develop a severe headache that does not go away  · You have new or increased vision changes, such as blurred or spotted vision  · You have new or increased swelling in your face or hands  · You have vaginal spotting or bleeding  · Your water broke or you feel warm water gushing or trickling from your vagina  Contact your healthcare provider if:   · You have more than 5 contractions in 1 hour  · You notice any changes in your baby's movements  · You have abdominal cramps, pressure, or tightening  · You have a change in vaginal discharge  · You have chills or a fever  · You have vaginal itching, burning, or pain  · You have yellow, green, white, or foul-smelling vaginal discharge  · You have pain or burning when you urinate, less urine than usual, or pink or bloody urine  · You have questions or concerns about your condition or care  How to care for yourself at this stage of your pregnancy:   · Eat a variety of healthy foods  Healthy foods include fruits, vegetables, whole-grain breads, low-fat dairy foods, beans, lean meats, and fish  Drink liquids as directed  Ask how much liquid to drink each day and which liquids are best for you  Limit caffeine to less than 200 milligrams each day  Limit your intake of fish to 2 servings each week  Choose fish low in mercury such as canned light tuna, shrimp, salmon, cod, or tilapia  Do not  eat fish high in mercury such as swordfish, tilefish, flor mackerel, and shark  · Take prenatal vitamins as directed    Your need for certain vitamins and minerals, such as folic acid, increases during pregnancy  Prenatal vitamins provide some of the extra vitamins and minerals you need  Prenatal vitamins may also help to decrease the risk of certain birth defects  · Rest as needed  Put your feet up if you have swelling in your ankles and feet  · Do not smoke  Smoking increases your risk of a miscarriage and other health problems during your pregnancy  Smoking can cause your baby to be born early or weigh less at birth  Ask your healthcare provider for information if you need help quitting  · Do not drink alcohol  Alcohol passes from your body to your baby through the placenta  It can affect your baby's brain development and cause fetal alcohol syndrome (FAS)  FAS is a group of conditions that causes mental, behavior, and growth problems  · Talk to your healthcare provider before you take any medicines  Many medicines may harm your baby if you take them when you are pregnant  Do not take any medicines, vitamins, herbs, or supplements without first talking to your healthcare provider  Never use illegal or street drugs (such as marijuana or cocaine) while you are pregnant  · Talk to your healthcare provider before you travel  You may not be able to travel in an airplane after 36 weeks  He may also recommend that you avoid long road trips  Safety tips during pregnancy:   · Avoid hot tubs and saunas  Do not use a hot tub or sauna while you are pregnant, especially during your first trimester  Hot tubs and saunas may raise your baby's temperature and increase the risk of birth defects  · Avoid toxoplasmosis  This is an infection caused by eating raw meat or being around infected cat feces  It can cause birth defects, miscarriages, and other problems  Wash your hands after you touch raw meat  Make sure any meat is well-cooked before you eat it  Avoid raw eggs and unpasteurized milk   Use gloves or ask someone else to clean your cat's litter box while you are pregnant  · Ask your healthcare provider about travel  The most comfortable time to travel is during the second trimester  Ask your healthcare provider if you can travel after 36 weeks  You may not be able to travel in an airplane after 36 weeks  He may also recommend that you avoid long road trips  Changes that are happening with your baby:  By 38 weeks, your baby may weigh between 6 and 9 pounds  Your baby may be about 14 inches long from the top of the head to the rump (baby's bottom)  Your baby hears well enough to know your voice  As your baby gets larger, you may feel fewer kicks and more stretching and rolling  Your baby may move into a head-down position  Your baby will also rest lower in your abdomen  What you need to know about prenatal care: Your healthcare provider will check your blood pressure and weight  You may also need the following:  · A urine test  may also be done to check for sugar and protein  These can be signs of gestational diabetes or infection  Protein in your urine may also be a sign of preeclampsia  Preeclampsia is a condition that can develop during week 20 or later of your pregnancy  It causes high blood pressure, and it can cause problems with your kidneys and other organs  · A blood test  may be done to check for anemia (low iron level)  · A Tdap vaccine  may be recommended by your healthcare provider  · A group B strep test  is a test that is done to check for group B strep infection  Group B strep is a type of bacteria that may be found in the vagina or rectum  It can be passed to your baby during delivery if you have it  Your healthcare provider will take swab your vagina or rectum and send the sample to the lab for tests  · Fundal height  is a measurement of your uterus to check your baby's growth  This number is usually the same as the number of weeks that you have been pregnant   Your healthcare provider may also check your baby's position  · Your baby's heart rate  will be checked  © Copyright [address] Information is for End User's use only and may not be sold, redistributed or otherwise used for commercial purposes  All illustrations and images included in CareNotes® are the copyrighted property of A D A M , Inc  or Jenni Burt   The above information is an  only  It is not intended as medical advice for individual conditions or treatments  Talk to your doctor, nurse or pharmacist before following any medical regimen to see if it is safe and effective for you

## 2021-05-17 NOTE — PROGRESS NOTES
Pt is having fetal movement  No LOF or vaginal bleeding  Pt is c/o itching all over for about a month

## 2021-05-17 NOTE — PROGRESS NOTES
Problem List Items Addressed This Visit        Musculoskeletal and Integument    Pruritus    Relevant Orders    Bile acids, total    Comprehensive metabolic panel       Other    Pregnancy, obstetrical care, third trimester - Primary    Relevant Orders    Strep B DNA probe, amplification    POCT urine dip    Excessive weight gain during pregnancy in third trimester    Obesity affecting pregnancy in third trimester        Here with FOB  She feels well BUT has been having "itching all over" for a month now, not sure if it is her allergies  Will check bile acids and CMP  Denies LOF/CTX/VB  No other concerns  Discussed fetal kick counting  Yellow folder given and reviewed  Discussed COVID vaccine recommendations in pregnancy and declines for now but will think about it  Encouraged to start with her vaginal massages

## 2021-05-19 LAB
BILE AC SERPL-SCNC: 5.5 UMOL/L (ref 0–10)
GP B STREP DNA SPEC QL NAA+PROBE: POSITIVE

## 2021-05-24 NOTE — PROGRESS NOTES
Please refer to the [facility] ultrasound report in Ob Procedures for additional information regarding today's visit

## 2021-05-25 ENCOUNTER — ROUTINE PRENATAL (OUTPATIENT)
Dept: OBGYN CLINIC | Facility: CLINIC | Age: 26
End: 2021-05-25
Payer: COMMERCIAL

## 2021-05-25 ENCOUNTER — ROUTINE PRENATAL (OUTPATIENT)
Dept: PERINATAL CARE | Facility: OTHER | Age: 26
End: 2021-05-25
Payer: COMMERCIAL

## 2021-05-25 ENCOUNTER — ULTRASOUND (OUTPATIENT)
Dept: PERINATAL CARE | Facility: OTHER | Age: 26
End: 2021-05-25
Payer: COMMERCIAL

## 2021-05-25 VITALS
BODY MASS INDEX: 43.04 KG/M2 | DIASTOLIC BLOOD PRESSURE: 78 MMHG | SYSTOLIC BLOOD PRESSURE: 123 MMHG | WEIGHT: 267.8 LBS | HEART RATE: 112 BPM | HEIGHT: 66 IN

## 2021-05-25 VITALS
HEIGHT: 66 IN | DIASTOLIC BLOOD PRESSURE: 80 MMHG | BODY MASS INDEX: 42.91 KG/M2 | WEIGHT: 267 LBS | SYSTOLIC BLOOD PRESSURE: 124 MMHG

## 2021-05-25 DIAGNOSIS — O99.213 OBESITY AFFECTING PREGNANCY IN THIRD TRIMESTER: ICD-10-CM

## 2021-05-25 DIAGNOSIS — O99.213 OBESITY AFFECTING PREGNANCY IN THIRD TRIMESTER: Primary | ICD-10-CM

## 2021-05-25 DIAGNOSIS — Z34.03 ENCOUNTER FOR SUPERVISION OF NORMAL FIRST PREGNANCY IN THIRD TRIMESTER: Primary | ICD-10-CM

## 2021-05-25 DIAGNOSIS — Z3A.37 37 WEEKS GESTATION OF PREGNANCY: Primary | ICD-10-CM

## 2021-05-25 DIAGNOSIS — O26.03 EXCESSIVE WEIGHT GAIN DURING PREGNANCY IN THIRD TRIMESTER: ICD-10-CM

## 2021-05-25 LAB
SL AMB  POCT GLUCOSE, UA: NEGATIVE
SL AMB POCT URINE PROTEIN: NEGATIVE

## 2021-05-25 PROCEDURE — 59025 FETAL NON-STRESS TEST: CPT | Performed by: OBSTETRICS & GYNECOLOGY

## 2021-05-25 PROCEDURE — NC001 PR NO CHARGE: Performed by: OBSTETRICS & GYNECOLOGY

## 2021-05-25 PROCEDURE — 76815 OB US LIMITED FETUS(S): CPT | Performed by: OBSTETRICS & GYNECOLOGY

## 2021-05-25 PROCEDURE — 99213 OFFICE O/P EST LOW 20 MIN: CPT | Performed by: OBSTETRICS & GYNECOLOGY

## 2021-05-25 NOTE — PROGRESS NOTES
Non-Stress Testing:    Non-Stress test, equipment, procedure, and expected outcomes reviewed  Reviewed fetal kick counts and when to call OB  Hansa Feliz Verified patient understanding of fetal kick counts with teach back method  Patient reports feeling daily fetal movements  Patient has no questions or concerns  English

## 2021-05-25 NOTE — ASSESSMENT & PLAN NOTE
Oumar Perez is a 27 y o  Clari Boor  37w3d who presents for routine PNV after NST at [facility]  Denies OB complaints  Good fetal movement  Denies contractions, cramping, leakage of fluid or vaginal bleeding  C/o itching skin, recent blood work(bile salts) WNL, advised Claritin or Zyrtec daily, advised she may take a Benadryl 25 mg tablet at HS if needed for itching or sleep  Advised using moisturizer frequently to keep skin hydrated, ( coconut oil, Aveeno lotion or similar)  Breast pump form handed in to    S/p T-Dap vaccine  Reviewed  labor precautions and FKCs  Essential guide and [employer] and Me web site reinforced

## 2021-05-25 NOTE — PATIENT INSTRUCTIONS
[facility] pregnancy essential guide (look up on Internet for resource)  [employer] and me center web site (virtual tour of new L&D unit)  [place] Romero Contractions   AMBULATORY CARE:   Bessy Eden contractions  are tightening and squeezing of the muscles of your uterus (womb) during pregnancy  The uterine muscles control the uterus  [place] Rom[place] contractions stop on their own  They are not true labor contractions and do not cause your cervix (opening to your uterus) to dilate (open)  Common symptoms include the following:   · Pain or discomfort in your groin or lower abdomen that comes and goes     · Your contractions are short, and do not last longer each time they happen     · Your contractions do not get closer together each time    · Your contractions do not get stronger or more painful each time    · Your contractions stop when you change your position or rest    Seek care immediately if:   · You have bleeding from your vagina  · You have fluid leaking from your vagina that does not stop  · You feel a gush of fluid from your vagina  · Your contractions happen every 5 minutes or sooner, and last for more than 60 seconds  · Your contractions begin to feel stronger or more painful  · You feel a change in your baby's movement, or you feel fewer than 6 to 10 movements in an hour  Contact your healthcare provider if:   · You have a fever  · You have questions or concerns about your condition or care  Treatment for [place]Romero contractions  may include pain medicine to relieve discomfort or pain or sedatives to relax the muscles of your uterus  If you are dehydrated, he may give you fluids through an IV or tell you to drink liquids  Self care:   · Change your activity or your position  when you feel contractions begin  Walk if you have been lying or sitting  Lie down if you have been standing or walking  True labor will not stop by changing your position or activity  · Take a warm bath  to relax your body  · Drink more fluids  to prevent dehydration  Ask how much liquid to drink each day and which liquids are best for you  · Practice your labor breathing  to decrease your discomfort  This may help you get ready for true labor  Take slow, deep breaths, or fast, short breaths  Ask your healthcare provider how to practice labor breathing  Follow up with your healthcare provider as directed:  Write down your questions so you remember to ask them during your visits  © Copyright [address] Information is for End User's use only and may not be sold, redistributed or otherwise used for commercial purposes  All illustrations and images included in CareNotes® are the copyrighted property of A D A M , Inc  or [address] [place] [address] [facility]  The above information is an  only  It is not intended as medical advice for individual conditions or treatments  Talk to your doctor, nurse or pharmacist before following any medical regimen to see if it is safe and effective for you  Round Ligament Pain   WHAT YOU NEED TO KNOW:   What is round ligament pain? Round ligament pain is caused when ligaments are stretched as your uterus (womb) gets bigger during pregnancy  Round ligaments are found on each side of your uterus  They are bands of tissue that hold the uterus in place  Round ligament pain happens most often during the second trimester  It is a normal part of pregnancy and should stop by the third trimester  The pain is not serious and will not hurt your baby  What are the signs and symptoms of round ligament pain? · Pain on one or both sides of your lower abdomen or groin that may move up to your hip    · Spasms in the muscles in your abdomen    · Pain that lasts a few seconds    · Pain that happens when you exercise, sneeze, change positions, or stand quickly    How is round ligament pain diagnosed?   Your healthcare provider will examine you and ask about your pain  Tell the provider when the pain started, and if you feel it on one or both sides  Your provider may ask if anything helps the pain or makes it worse  What can I do to manage my pain? Round ligament pain does not need to be treated  The following may help make you more comfortable:  · Rest as often as you can  Rest can help relieve round ligament pain  You might want to lie on the side that has pain  Place a pillow under your abdomen  Keep another pillow between your knees  · Move slowly  Sudden movement can stretch the ligaments and cause pain  Stand, sit, and change positions slowly  Try to tighten the muscles in your hips before you sneeze or laugh  You can also sit down and bring your knees up toward your abdomen  This can help relieve tension on the ligaments  · Exercise as directed  Gentle exercise can keep the ligaments loose and strengthen core (abdominal) muscles  An example is swimming, or a yoga program designed for pregnancy  Ask your healthcare provider which exercises are safe for you and how often to exercise  For most healthy women, a good goal is to try to get at least 30 minutes of exercise every day  If activity causes pain, try not to walk too long or too far at one time  Break your exercise up into short amounts  · Apply a warm compress to the area  Warmth can relieve pain and muscle spasms  Ask your healthcare provider if you can take a warm bath or use a heating pad  Keep all heat settings low  High heat can be dangerous for your baby  Do not sit in a hot tub or use hot water in your bath  You may also be able to massage the area gently while you are applying heat  Massage can help relieve pain  · Ask about pain medicines  Ask your healthcare provider before you take any medicine during pregnancy, including over-the-counter pain medicines  Your healthcare provider may recommend acetaminophen to relieve the pain  Ask how much to take and how often to take it  Follow directions  Acetaminophen can cause liver damage  Too much medicine can be harmful to your baby  When should I contact my healthcare provider? · You have pain that is spreading to other parts of your body  · You have new or worsening pain  · You have pain that lasts longer than a few minutes at a time  · You have questions or concerns about your condition or care  CARE AGREEMENT:   You have the right to help plan your care  Learn about your health condition and how it may be treated  Discuss treatment options with your healthcare providers to decide what care you want to receive  You always have the right to refuse treatment  The above information is an  only  It is not intended as medical advice for individual conditions or treatments  Talk to your doctor, nurse or pharmacist before following any medical regimen to see if it is safe and effective for you  © Copyright [address] Information is for End User's use only and may not be sold, redistributed or otherwise used for commercial purposes   All illustrations and images included in CareNotes® are the copyrighted property of [employer]  or [address] [place] [address]

## 2021-05-25 NOTE — PATIENT INSTRUCTIONS
Nonstress Test for Pregnancy   WHAT YOU NEED TO KNOW:   What do I need to know about a nonstress test?  A nonstress test measures your baby's heart rate and movements  Nonstress means that no stress will be placed on your baby during the test    How do I prepare for a nonstress test?  Your healthcare provider will talk to you about how to prepare for this test  He may tell you to eat and drink plenty of fluids before your test  If you smoke, you may be asked not to smoke within 2 hours before the test  He will also tell you what medicines to take or not take on the day of your test    What will happen during a nonstress test?  You may be asked to lie down or recline back for the test on a bed  One or two belts with sensors will be placed around your abdomen  Your baby's heart rate will be recorded with a machine  If your baby does not move, your baby may be asleep  Your healthcare provider may make a noise near your abdomen to try to wake your baby  The test usually takes about 20 minutes, but can take longer if your baby needs to be awakened  What do I need to know about the test results? Your baby will be expected to move at least twice for a certain amount of time  Your baby's heart rate will be expected to go up by a certain number of beats per minute during movement  If your baby does not move as expected, the test may need to be repeated or you may need other tests  CARE AGREEMENT:   You have the right to help plan your care  Learn about your health condition and how it may be treated  Discuss treatment options with your healthcare providers to decide what care you want to receive  You always have the right to refuse treatment  The above information is an  only  It is not intended as medical advice for individual conditions or treatments  Talk to your doctor, nurse or pharmacist before following any medical regimen to see if it is safe and effective for you    © Copyright [address] [place] [address] Drive Information is for End User's use only and may not be sold, redistributed or otherwise used for commercial purposes  All illustrations and images included in CareNotes® are the copyrighted property of Katelin HANSEN  or [address] [place] [address] Ramon Pollard in Pregnancy   AMBULATORY CARE:   Kick counts  measure how much your baby is moving in your womb  A kick from your baby can be felt as a twist, turn, swish, roll, or jab  It is common to feel your baby kicking at 26 to 28 weeks of pregnancy  You may feel your baby kick as early as 20 weeks of pregnancy  You may want to start counting at 28 weeks  Contact your healthcare provider immediately if:   · You feel a change in the number of kicks or movements of your baby  · You feel fewer than 10 kicks within 2 hours  · You have questions or concerns about your baby's movements  Why measure kick counts:  Your baby's movement may provide information about your baby's health  He or she may move less, or not at all, if there are problems  Your baby may move less if he or she is not getting enough oxygen or nutrition from the placenta  Do not smoke while you are pregnant  Smoking decreases the amount of oxygen that gets to your baby  Talk to your healthcare provider if you need help to quit smoking  Tell your healthcare provider as soon as you feel a change in your baby's movements  When to measure kick counts:   · Measure kick counts at the same time every day  · Measure kick counts when your baby is awake and most active  Your baby may be most active in the evening  How to measure kick counts:  Check that your baby is awake before you measure kick counts  You can wake up your baby by lightly pushing on your belly, walking, or drinking something cold  Your healthcare provider may tell you different ways to measure kick counts  You may be told to do the following:  · Use a chart or clock to keep track of the time you start and finish counting       · Sit in a chair or lie on your left side  · Place your hands on the largest part of your belly  · Count until you reach 10 kicks  Write down how much time it takes to count 10 kicks  · It may take 30 minutes to 2 hours to count 10 kicks  It should not take more than 2 hours to count 10 kicks  Follow up with your healthcare provider as directed:  Write down your questions so you remember to ask them during your visits  © Copyright [address] Information is for End User's use only and may not be sold, redistributed or otherwise used for commercial purposes  All illustrations and images included in CareNotes® are the copyrighted property of A D A M , Inc  or [address] [place] [address]  The above information is an  only  It is not intended as medical advice for individual conditions or treatments  Talk to your doctor, nurse or pharmacist before following any medical regimen to see if it is safe and effective for you

## 2021-05-25 NOTE — LETTER
NST sleeve cover sheet    Patient name: Aruna Diallo  : 1995  MRN: [id number]    BILL: Estimated Date of Delivery: 21        Obstetrician: _______________sloga________________    Reason(s) for testing:  ___________________________BMI>40_______________      Testing frequency:    ___ 2x/wk  ___ 1x/wk  ___ Dopplers  ___ BPP?       Last growth scan: __________________________________________

## 2021-05-25 NOTE — LETTER
May 25, 2021     Selwyn Dobbins, [address] [address]    Patient: Dayana Menard   YOB: 1995   Date of Visit: 5/25/2021       Dear Dr Leonides Meza: Thank you for referring Dayana Menard to me for evaluation  Below are my notes for this consultation  If you have questions, please do not hesitate to call me  I look forward to following your patient along with you  Sincerely,        Elisha Morillo MD        CC: No Recipients  Elisha Morillo MD  5/24/2021  6:58 PM  Sign when Signing Visit   Please refer to the [facility] ultrasound report in Ob Procedures for additional information regarding today's visit

## 2021-06-01 ENCOUNTER — ROUTINE PRENATAL (OUTPATIENT)
Dept: OBGYN CLINIC | Facility: CLINIC | Age: 26
End: 2021-06-01
Payer: COMMERCIAL

## 2021-06-01 VITALS — BODY MASS INDEX: 43.42 KG/M2 | WEIGHT: 269 LBS | SYSTOLIC BLOOD PRESSURE: 134 MMHG | DIASTOLIC BLOOD PRESSURE: 86 MMHG

## 2021-06-01 DIAGNOSIS — O99.213 OBESITY AFFECTING PREGNANCY IN THIRD TRIMESTER: ICD-10-CM

## 2021-06-01 DIAGNOSIS — Z34.03 ENCOUNTER FOR SUPERVISION OF NORMAL FIRST PREGNANCY IN THIRD TRIMESTER: Primary | ICD-10-CM

## 2021-06-01 LAB
SL AMB  POCT GLUCOSE, UA: NEGATIVE
SL AMB POCT URINE PROTEIN: NEGATIVE

## 2021-06-01 PROCEDURE — 99213 OFFICE O/P EST LOW 20 MIN: CPT | Performed by: STUDENT IN AN ORGANIZED HEALTH CARE EDUCATION/TRAINING PROGRAM

## 2021-06-01 NOTE — PROGRESS NOTES
Obesity affecting pregnancy in third trimester  TWG 77#  Recommended 11-20#  Previously counseled by myself on risks of excess weight gain in pregnancy   testing for BMI >40    Encounter for supervision of normal first pregnancy in third trimester  26 yo  at 38+3 here for routine ob visit  She is feeling well  Good fetal movement  No leaking or bleeding  Discussed ARRIVE  Patient desires to wait for labor, she desires natural child birth  Return in 1 wks

## 2021-06-01 NOTE — ASSESSMENT & PLAN NOTE
28 yo  at 38+3 here for routine ob visit  She is feeling well  Good fetal movement  No leaking or bleeding  Discussed ARRIVE  Patient desires to wait for labor, she desires natural child birth  Return in 1 wks

## 2021-06-01 NOTE — PROGRESS NOTES
Patient presents for a routine prenatal visit  Good Fetal Movement  No LOF,bleeding, cramping or discharge  GBS +  S/p tdap vaccine   Breast pump and delivery consent form in media     Urine -/-

## 2021-06-01 NOTE — ASSESSMENT & PLAN NOTE
TWG 77#  Recommended 11-20#  Previously counseled by myself on risks of excess weight gain in pregnancy       testing for BMI >40

## 2021-06-02 ENCOUNTER — TELEPHONE (OUTPATIENT)
Dept: PERINATAL CARE | Facility: CLINIC | Age: 26
End: 2021-06-02

## 2021-06-02 NOTE — TELEPHONE ENCOUNTER
Patient no showed 6/2/21 NST/SHELLEY appt  LVM notifying pt to contact OB to reschedule for this week and schedule for next weeks NST/SHELLEY

## 2021-06-07 ENCOUNTER — ROUTINE PRENATAL (OUTPATIENT)
Dept: OBGYN CLINIC | Facility: CLINIC | Age: 26
End: 2021-06-07
Payer: COMMERCIAL

## 2021-06-07 ENCOUNTER — TELEPHONE (OUTPATIENT)
Dept: OBGYN CLINIC | Facility: CLINIC | Age: 26
End: 2021-06-07

## 2021-06-07 VITALS — SYSTOLIC BLOOD PRESSURE: 132 MMHG | BODY MASS INDEX: 42.93 KG/M2 | DIASTOLIC BLOOD PRESSURE: 88 MMHG | WEIGHT: 266 LBS

## 2021-06-07 DIAGNOSIS — Z34.93 PREGNANCY, OBSTETRICAL CARE, THIRD TRIMESTER: Primary | ICD-10-CM

## 2021-06-07 DIAGNOSIS — O99.213 OBESITY AFFECTING PREGNANCY IN THIRD TRIMESTER: ICD-10-CM

## 2021-06-07 DIAGNOSIS — O26.03 EXCESSIVE WEIGHT GAIN DURING PREGNANCY IN THIRD TRIMESTER: ICD-10-CM

## 2021-06-07 LAB
SL AMB  POCT GLUCOSE, UA: NORMAL
SL AMB POCT URINE PROTEIN: NORMAL

## 2021-06-07 PROCEDURE — 99213 OFFICE O/P EST LOW 20 MIN: CPT | Performed by: STUDENT IN AN ORGANIZED HEALTH CARE EDUCATION/TRAINING PROGRAM

## 2021-06-07 NOTE — TELEPHONE ENCOUNTER
Please advise the 06/12 land on a Saturday  I have CS on for calls on 06/12 but not physically at [facility]  Do you want me to place patient for 06/11 pm for Cs to physically go to the hospital and deliver on 06/12?  Please advise

## 2021-06-07 NOTE — TELEPHONE ENCOUNTER
Spoke with griselle l and d, pt scheduled for 06/11 8 artemio for IOL, pt to deliver on 06/12 with provider CS @ [facility] AT [place]       Pt contacted # [phone] vm- [id number]

## 2021-06-07 NOTE — PROGRESS NOTES
27 y o   at 39w2d presents for routine prenatal visit  She denies contractions/leakage of fluid/vaginal bleeding  She feels good fetal movement  Problem List Items Addressed This Visit        Other    Pregnancy, obstetrical care, third trimester - Primary    Excessive weight gain during pregnancy in third trimester  BMI > 40  NST reactive today  SHELLEY 14  Interested in [address] [place] [address] after due date   Will send request     Obesity affecting pregnancy in third trimester   EFW 55%

## 2021-06-07 NOTE — TELEPHONE ENCOUNTER
Pt contacted and informed location, date time  Pt declined  Pt claimed she informed the dr  that she would like to go naturally  Pt stated she does not want to be induced unless she is still pregnant by 06/15  Pt stated she would like to be induced after 06/15 not a day prior to  I informed pt I will change her iol date but I cant suleman CS will deliver  Pt stated she is okay with who ever delivers as she has already seen numerous drs  I called l and d spoke with griselle, pt was cancelled for 06/11, pt can not be scheduled for 06/15 or later due to 7 days window in scheduling on electives

## 2021-06-07 NOTE — TELEPHONE ENCOUNTER
----- Message from Stefani England MD sent at 6/7/2021 11:37 AM EDT -----  Regarding: IOL  Procedure to be scheduled (IOL or CS): IOL    BILL: Estimated Date of Delivery: 6/12/21     Indication for delivery: elective    Requested date (s) of delivery: 6/12/21   If requested date is unavailable, is there a date by which the pt must be delivered?  6/19/21    Physician preference: none    If IOL, anticipated method: moon/cytotec

## 2021-06-07 NOTE — TELEPHONE ENCOUNTER
If pt is ok with cervical ripening on 6/11 and IOL on 6/12 I'm fine with this  Otherwise ripening on 6/12 is also fine   thanks

## 2021-06-09 NOTE — TELEPHONE ENCOUNTER
I called to schedule pt for 06/16 8 pm but unable to due to 7 day window policy must be called in 0610 for 06/16 into 06/17 -per gr[facility]elle [facility]

## 2021-06-11 NOTE — TELEPHONE ENCOUNTER
Spoke with nick at l and d pt scheduled for 06/16/21 8 pm at [facility] to deliver 06/17/21 with SRIDEVI  Pt contacted and informed location, date, time, masking and visitation policy  Pt agreed to plan of action

## 2021-06-15 ENCOUNTER — ROUTINE PRENATAL (OUTPATIENT)
Dept: OBGYN CLINIC | Facility: CLINIC | Age: 26
End: 2021-06-15
Payer: COMMERCIAL

## 2021-06-15 ENCOUNTER — TELEPHONE (OUTPATIENT)
Dept: OBGYN CLINIC | Facility: CLINIC | Age: 26
End: 2021-06-15

## 2021-06-15 VITALS — BODY MASS INDEX: 42.93 KG/M2 | SYSTOLIC BLOOD PRESSURE: 128 MMHG | DIASTOLIC BLOOD PRESSURE: 70 MMHG | HEIGHT: 66 IN

## 2021-06-15 DIAGNOSIS — Z34.03 ENCOUNTER FOR SUPERVISION OF NORMAL FIRST PREGNANCY IN THIRD TRIMESTER: Primary | ICD-10-CM

## 2021-06-15 LAB
SL AMB  POCT GLUCOSE, UA: NEGATIVE
SL AMB POCT URINE PROTEIN: NEGATIVE

## 2021-06-15 PROCEDURE — 99213 OFFICE O/P EST LOW 20 MIN: CPT | Performed by: OBSTETRICS & GYNECOLOGY

## 2021-06-15 NOTE — PATIENT INSTRUCTIONS
Induction of Labor   WHAT YOU NEED TO KNOW:   What is induction of labor? Induction of labor is a procedure to induce (start) your labor before it begins on its own  Medicines and other methods are used to start contractions and help your cervix soften, thin (efface), and dilate (open)  You may be given antibiotics to fight a bacterial infection you have or prevent an infection during delivery  Why might I need induction of labor? · A health problem you have, such as high blood pressure or diabetes    · A health problem your baby has, such as a slow heartbeat or poor growth inside the womb     · Problems related to your pregnancy, such as infection of the amniotic fluid, your water breaks before labor begins, or you have too little amniotic fluid    What happens during induction of labor? Your healthcare provider may use one or more of the following to induce labor:  · Cervical ripening  is a process that helps to soften and thin out your cervix  Medicines called prostaglandins may be used to ripen your cervix  These medicines can be inserted into your vagina or taken as a pill  Other methods can also be used to dilate the cervix  This includes a catheter with an inflatable balloon on the end that is inserted into your cervix  Saline injected through the catheter helps the balloon to expand  A substance that absorbs water may also be inserted into your cervix to help dilate it  · Stripping the membranes  is a procedure that causes your body to release prostaglandins naturally  Prostaglandins soften the cervix and may help to cause contractions  Your healthcare provider will sweep a gloved finger over the membranes that connect the amniotic sac to the uterus wall  · Rupturing the amniotic sac  is a procedure that is used to cause your water to break  Your healthcare provider will use a small tool to make a hole in your amniotic sac  This may help contractions to start       · Oxytocin  may be given through an IV to cause contractions to start and stay strong and regular  What are the risks of induction of labor? Medicines used to induce labor may cause too many contractions  This can lower your baby's heartbeat and decrease his or her oxygen supply  Induction of labor also increases the risk of umbilical cord prolapse  This condition causes the umbilical cord to slip back into the vagina before delivery  It can compress the cord and decrease your baby's oxygen supply  Medical induction may cause an infection in you or your baby  Medical induction may also increase your risk for a  section (), especially if it is the first time you give birth  Your uterus may rupture if you have had a  before  CARE AGREEMENT:   You have the right to help plan your care  Learn about your health condition and how it may be treated  Discuss treatment options with your healthcare providers to decide what care you want to receive  You always have the right to refuse treatment  The above information is an  only  It is not intended as medical advice for individual conditions or treatments  Talk to your doctor, nurse or pharmacist before following any medical regimen to see if it is safe and effective for you  © Copyright [address] [place] [address] Information is for End User's use only and may not be sold, redistributed or otherwise used for commercial purposes  All illustrations and images included in CareNotes® are the copyrighted property of [employer]  or Nabil Pollard in Pregnancy   AMBULATORY CARE:   Kick counts  measure how much your baby is moving in your womb  A kick from your baby can be felt as a twist, turn, swish, roll, or jab  It is common to feel your baby kicking at 26 to 28 weeks of pregnancy  You may feel your baby kick as early as 20 weeks of pregnancy  You may want to start counting at 28 weeks     Contact your healthcare provider immediately if: · You feel a change in the number of kicks or movements of your baby  · You feel fewer than 10 kicks within 2 hours  · You have questions or concerns about your baby's movements  Why measure kick counts:  Your baby's movement may provide information about your baby's health  He or she may move less, or not at all, if there are problems  Your baby may move less if he or she is not getting enough oxygen or nutrition from the placenta  Do not smoke while you are pregnant  Smoking decreases the amount of oxygen that gets to your baby  Talk to your healthcare provider if you need help to quit smoking  Tell your healthcare provider as soon as you feel a change in your baby's movements  When to measure kick counts:   · Measure kick counts at the same time every day  · Measure kick counts when your baby is awake and most active  Your baby may be most active in the evening  How to measure kick counts:  Check that your baby is awake before you measure kick counts  You can wake up your baby by lightly pushing on your belly, walking, or drinking something cold  Your healthcare provider may tell you different ways to measure kick counts  You may be told to do the following:  · Use a chart or clock to keep track of the time you start and finish counting  · Sit in a chair or lie on your left side  · Place your hands on the largest part of your belly  · Count until you reach 10 kicks  Write down how much time it takes to count 10 kicks  · It may take 30 minutes to 2 hours to count 10 kicks  It should not take more than 2 hours to count 10 kicks  Follow up with your healthcare provider as directed:  Write down your questions so you remember to ask them during your visits  © Copyright [address] Information is for End User's use only and may not be sold, redistributed or otherwise used for commercial purposes   All illustrations and images included in CareNotes® are the copyrighted property of [employer]  or [address]   The above information is an  only  It is not intended as medical advice for individual conditions or treatments  Talk to your doctor, nurse or pharmacist before following any medical regimen to see if it is safe and effective for you  Pregnancy at 44 to 40 Weeks   AMBULATORY CARE:   What changes are happening to your body: You are now getting close to your due date  Your due date is just an estimate of when your baby will be born  Your baby may be born before or after your due date  Your breathing may be easier if your baby has moved down into a head-down position  You may need to urinate more often because the baby may be pressing on your bladder  You may also feel more discomfort and tire easily  You may also be having trouble sleeping  Seek care immediately if:   · You develop a severe headache that does not go away  · You have new or increased vision changes, such as blurred or spotted vision  · You have new or increased swelling in your face or hands  · You have vaginal spotting or bleeding  · Your water broke or you feel warm water gushing or trickling from your vagina  Contact your healthcare provider if:   · You have more than 5 contractions in 1 hour  · You notice any changes in your baby's movements  · You have abdominal cramps, pressure, or tightening  · You have a change in vaginal discharge  · You have chills or a fever  · You have vaginal itching, burning, or pain  · You have yellow, green, white, or foul-smelling vaginal discharge  · You have pain or burning when you urinate, less urine than usual, or pink or bloody urine  · You have questions or concerns about your condition or care  How to care for yourself at this stage of your pregnancy:   · Eat a variety of healthy foods  Healthy foods include fruits, vegetables, whole-grain breads, low-fat dairy foods, beans, lean meats, and fish  Drink liquids as directed  Ask how much liquid to drink each day and which liquids are best for you  Limit caffeine to less than 200 milligrams each day  Limit your intake of fish to 2 servings each week  Choose fish low in mercury such as canned light tuna, shrimp, salmon, cod, or tilapia  Do not  eat fish high in mercury such as swordfish, tilefish, flor mackerel, and shark  · Take prenatal vitamins as directed  Your need for certain vitamins and minerals, such as folic acid, increases during pregnancy  Prenatal vitamins provide some of the extra vitamins and minerals you need  Prenatal vitamins may also help to decrease the risk of certain birth defects  · Rest as needed  Put your feet up if you have swelling in your ankles and feet  · Do not smoke  Smoking increases your risk of a miscarriage and other health problems during your pregnancy  Smoking can cause your baby to be born early or weigh less at birth  Ask your healthcare provider for information if you need help quitting  · Do not drink alcohol  Alcohol passes from your body to your baby through the placenta  It can affect your baby's brain development and cause fetal alcohol syndrome (FAS)  FAS is a group of conditions that causes mental, behavior, and growth problems  · Talk to your healthcare provider before you take any medicines  Many medicines may harm your baby if you take them when you are pregnant  Do not take any medicines, vitamins, herbs, or supplements without first talking to your healthcare provider  Never use illegal or street drugs (such as marijuana or cocaine) while you are pregnant  · Talk to your healthcare provider before you travel  You may not be able to travel in an airplane after 36 weeks  He may also recommend that you avoid long road trips  Safety tips during pregnancy:   · Avoid hot tubs and saunas    Do not use a hot tub or sauna while you are pregnant, especially during your first trimester  Hot tubs and saunas may raise your baby's temperature and increase the risk of birth defects  · Avoid toxoplasmosis  This is an infection caused by eating raw meat or being around infected cat feces  It can cause birth defects, miscarriages, and other problems  Wash your hands after you touch raw meat  Make sure any meat is well-cooked before you eat it  Avoid raw eggs and unpasteurized milk  Use gloves or ask someone else to clean your cat's litter box while you are pregnant  · Ask your healthcare provider about travel  The most comfortable time to travel is during the second trimester  Ask your healthcare provider if you can travel after 36 weeks  You may not be able to travel in an airplane after 36 weeks  He may also recommend that you avoid long road trips  Changes that are happening with your baby: Your baby is ready to be born  At birth, your baby may weigh about 6 to 9 pounds and be about 19 to 21 inches long  Your baby may be in a head-down position  Your baby will also rest lower in your abdomen  What you need to know about prenatal care: Your healthcare provider will check your blood pressure and weight  You may also need the following:  · A urine test  may also be done to check for sugar and protein  These can be signs of gestational diabetes or infection  Protein in your urine may also be a sign of preeclampsia  Preeclampsia is a condition that can develop during week 20 or later of your pregnancy  It causes high blood pressure, and it can cause problems with your kidneys and other organs  · Your baby's heart rate  will be checked  © Copyright Bear [employer]ke Information is for End User's use only and may not be sold, redistributed or otherwise used for commercial purposes  All illustrations and images included in CareNotes® are the copyrighted property of [employer]  or [facility] Dorene Burt   The above information is an  only   It is not intended as medical advice for individual conditions or treatments  Talk to your doctor, nurse or pharmacist before following any medical regimen to see if it is safe and effective for you

## 2021-06-15 NOTE — TELEPHONE ENCOUNTER
----- Message from [facility] sent at 6/15/2021 11:43 AM EDT -----  Laura Barnard would like to move her induction ahead a week please     She was scheduled for 6/16/21 at [facility] with Dr Geraldine Chen in regards to patients request  She is ok with patients request    We are doing and SHELLEY and NST in the office today

## 2021-06-15 NOTE — ASSESSMENT & PLAN NOTE
Bala Greer is a 27 y o  Juliann Chavira who presents for routine PNV  28 week labs reviewed: WNL  Denies OB complaints  Good fetal movement  Denies contractions, cramping, leakage of fluid or vaginal bleeding  Patient scheduled  for Induction   SHELLEY done by Dr QUIROGA [facility],   NST reactive   Cervical exam today : FT dialation, no effacement  S/p T dap  vaccine  Reviewed  FKCs  Essential guide and Baby and Me web site reinforced

## 2021-06-15 NOTE — PROGRESS NOTES
NST baseline 130, moderate variability, accelerations, no decelerations  REACTIVE   MHR = 98    SHELLEY = 9 5, cephalic presentation

## 2021-06-15 NOTE — PROGRESS NOTES
Encounter for supervision of normal first pregnancy in third trimester  Chavo Loudon is a 27 y o  Sinha Cushing who presents for routine PNV  28 week labs reviewed: WNL  Denies OB complaints  Good fetal movement  Denies contractions, cramping, leakage of fluid or vaginal bleeding  Patient scheduled  for Induction   SHELLEY done by Dr QUIROGA [facility],   NST reactive   Cervical exam today : FT dialation, no effacement  S/p T dap  vaccine  Reviewed  FKCs  Essential guide and Baby and Me web site reinforced

## 2021-06-16 ENCOUNTER — HOSPITAL ENCOUNTER (OUTPATIENT)
Dept: LABOR AND DELIVERY | Facility: HOSPITAL | Age: 26
Discharge: HOME | DRG: 540 | End: 2021-06-16
Payer: COMMERCIAL

## 2021-06-16 ENCOUNTER — HOSPITAL ENCOUNTER (INPATIENT)
Facility: HOSPITAL | Age: 26
LOS: 6 days | Discharge: HOME | DRG: 540 | End: 2021-06-22
Attending: STUDENT IN AN ORGANIZED HEALTH CARE EDUCATION/TRAINING PROGRAM | Admitting: OBSTETRICS & GYNECOLOGY
Payer: COMMERCIAL

## 2021-06-16 DIAGNOSIS — Z98.891 S/P PRIMARY LOW TRANSVERSE C-SECTION: Primary | ICD-10-CM

## 2021-06-16 DIAGNOSIS — Z34.93 PREGNANCY, OBSTETRICAL CARE, THIRD TRIMESTER: ICD-10-CM

## 2021-06-16 DIAGNOSIS — O61.8 OTHER FAILED INDUCTION OF LABOR: ICD-10-CM

## 2021-06-16 LAB
ABO GROUP BLD: NORMAL
BLD GP AB SCN SERPL QL: NEGATIVE
ERYTHROCYTE [DISTWIDTH] IN BLOOD BY AUTOMATED COUNT: 14.6 % (ref 11.6–15.1)
HCT VFR BLD AUTO: 33.3 % (ref 34.8–46.1)
HGB BLD-MCNC: 10.9 G/DL (ref 11.5–15.4)
MCH RBC QN AUTO: 27.2 PG (ref 26.8–34.3)
MCHC RBC AUTO-ENTMCNC: 32.7 G/DL (ref 31.4–37.4)
MCV RBC AUTO: 83 FL (ref 82–98)
PLATELET # BLD AUTO: 243 THOUSANDS/UL (ref 149–390)
PMV BLD AUTO: 10.8 FL (ref 8.9–12.7)
RBC # BLD AUTO: 4.01 MILLION/UL (ref 3.81–5.12)
RH BLD: POSITIVE
SPECIMEN EXPIRATION DATE: NORMAL
WBC # BLD AUTO: 8.37 THOUSAND/UL (ref 4.31–10.16)

## 2021-06-16 PROCEDURE — 86592 SYPHILIS TEST NON-TREP QUAL: CPT | Performed by: OBSTETRICS & GYNECOLOGY

## 2021-06-16 PROCEDURE — 4A1HXCZ MONITORING OF PRODUCTS OF CONCEPTION, CARDIAC RATE, EXTERNAL APPROACH: ICD-10-PCS | Performed by: STUDENT IN AN ORGANIZED HEALTH CARE EDUCATION/TRAINING PROGRAM

## 2021-06-16 PROCEDURE — 86901 BLOOD TYPING SEROLOGIC RH(D): CPT | Performed by: OBSTETRICS & GYNECOLOGY

## 2021-06-16 PROCEDURE — 86900 BLOOD TYPING SEROLOGIC ABO: CPT | Performed by: OBSTETRICS & GYNECOLOGY

## 2021-06-16 PROCEDURE — 86850 RBC ANTIBODY SCREEN: CPT | Performed by: OBSTETRICS & GYNECOLOGY

## 2021-06-16 PROCEDURE — NC001 PR NO CHARGE: Performed by: STUDENT IN AN ORGANIZED HEALTH CARE EDUCATION/TRAINING PROGRAM

## 2021-06-16 PROCEDURE — 85027 COMPLETE CBC AUTOMATED: CPT | Performed by: OBSTETRICS & GYNECOLOGY

## 2021-06-16 RX ORDER — SODIUM CHLORIDE, SODIUM LACTATE, POTASSIUM CHLORIDE, CALCIUM CHLORIDE 600; 310; 30; 20 MG/100ML; MG/100ML; MG/100ML; MG/100ML
125 INJECTION, SOLUTION INTRAVENOUS CONTINUOUS
Status: DISCONTINUED | OUTPATIENT
Start: 2021-06-16 | End: 2021-06-19

## 2021-06-16 RX ADMIN — MISOPROSTOL 50 MCG: 100 TABLET ORAL at 22:08

## 2021-06-16 RX ADMIN — SODIUM CHLORIDE, SODIUM LACTATE, POTASSIUM CHLORIDE, AND CALCIUM CHLORIDE 125 ML/HR: .6; .31; .03; .02 INJECTION, SOLUTION INTRAVENOUS at 21:35

## 2021-06-16 RX ADMIN — SODIUM CHLORIDE 5 MILLION UNITS: 0.9 INJECTION, SOLUTION INTRAVENOUS at 21:48

## 2021-06-17 LAB
ALBUMIN SERPL BCP-MCNC: 2.8 G/DL (ref 3.5–5)
ALP SERPL-CCNC: 131 U/L (ref 46–116)
ALT SERPL W P-5'-P-CCNC: 13 U/L (ref 12–78)
ANION GAP SERPL CALCULATED.3IONS-SCNC: 14 MMOL/L (ref 4–13)
AST SERPL W P-5'-P-CCNC: 17 U/L (ref 5–45)
BILIRUB SERPL-MCNC: 0.16 MG/DL (ref 0.2–1)
BUN SERPL-MCNC: 11 MG/DL (ref 5–25)
CALCIUM ALBUM COR SERPL-MCNC: 9.9 MG/DL (ref 8.3–10.1)
CALCIUM SERPL-MCNC: 8.9 MG/DL (ref 8.3–10.1)
CHLORIDE SERPL-SCNC: 105 MMOL/L (ref 100–108)
CO2 SERPL-SCNC: 21 MMOL/L (ref 21–32)
CREAT SERPL-MCNC: 0.72 MG/DL (ref 0.6–1.3)
CREAT UR-MCNC: 88 MG/DL
GFR SERPL CREATININE-BSD FRML MDRD: 136 ML/MIN/1.73SQ M
GLUCOSE SERPL-MCNC: 56 MG/DL (ref 65–140)
POTASSIUM SERPL-SCNC: 3.7 MMOL/L (ref 3.5–5.3)
PROT SERPL-MCNC: 7.1 G/DL (ref 6.4–8.2)
PROT UR-MCNC: 13 MG/DL
PROT/CREAT UR: 0.15 MG/G{CREAT} (ref 0–0.1)
RPR SER QL: NORMAL
SODIUM SERPL-SCNC: 140 MMOL/L (ref 136–145)

## 2021-06-17 PROCEDURE — 82570 ASSAY OF URINE CREATININE: CPT | Performed by: OBSTETRICS & GYNECOLOGY

## 2021-06-17 PROCEDURE — 84156 ASSAY OF PROTEIN URINE: CPT | Performed by: OBSTETRICS & GYNECOLOGY

## 2021-06-17 PROCEDURE — 80053 COMPREHEN METABOLIC PANEL: CPT | Performed by: OBSTETRICS & GYNECOLOGY

## 2021-06-17 RX ORDER — OXYTOCIN/RINGER'S LACTATE 30/500 ML
1-30 PLASTIC BAG, INJECTION (ML) INTRAVENOUS
Status: DISCONTINUED | OUTPATIENT
Start: 2021-06-17 | End: 2021-06-19

## 2021-06-17 RX ORDER — BUTORPHANOL TARTRATE 1 MG/ML
1 INJECTION, SOLUTION INTRAMUSCULAR; INTRAVENOUS ONCE
Status: COMPLETED | OUTPATIENT
Start: 2021-06-17 | End: 2021-06-17

## 2021-06-17 RX ORDER — PROMETHAZINE HYDROCHLORIDE 25 MG/ML
12.5 INJECTION, SOLUTION INTRAMUSCULAR; INTRAVENOUS ONCE
Status: COMPLETED | OUTPATIENT
Start: 2021-06-17 | End: 2021-06-17

## 2021-06-17 RX ORDER — TERBUTALINE SULFATE 1 MG/ML
INJECTION, SOLUTION SUBCUTANEOUS
Status: DISCONTINUED
Start: 2021-06-17 | End: 2021-06-17 | Stop reason: WASHOUT

## 2021-06-17 RX ADMIN — Medication 2 MILLI-UNITS/MIN: at 11:30

## 2021-06-17 RX ADMIN — SODIUM CHLORIDE, SODIUM LACTATE, POTASSIUM CHLORIDE, AND CALCIUM CHLORIDE 125 ML/HR: .6; .31; .03; .02 INJECTION, SOLUTION INTRAVENOUS at 05:13

## 2021-06-17 RX ADMIN — SODIUM CHLORIDE, SODIUM LACTATE, POTASSIUM CHLORIDE, AND CALCIUM CHLORIDE 125 ML/HR: .6; .31; .03; .02 INJECTION, SOLUTION INTRAVENOUS at 14:41

## 2021-06-17 RX ADMIN — SODIUM CHLORIDE 2.5 MILLION UNITS: 9 INJECTION, SOLUTION INTRAVENOUS at 18:41

## 2021-06-17 RX ADMIN — MISOPROSTOL 25 MCG: 100 TABLET ORAL at 01:47

## 2021-06-17 RX ADMIN — SODIUM CHLORIDE 2.5 MILLION UNITS: 9 INJECTION, SOLUTION INTRAVENOUS at 06:10

## 2021-06-17 RX ADMIN — PROMETHAZINE HYDROCHLORIDE 12.5 MG: 25 INJECTION INTRAMUSCULAR; INTRAVENOUS at 13:44

## 2021-06-17 RX ADMIN — SODIUM CHLORIDE 2.5 MILLION UNITS: 9 INJECTION, SOLUTION INTRAVENOUS at 22:37

## 2021-06-17 RX ADMIN — SODIUM CHLORIDE 2.5 MILLION UNITS: 9 INJECTION, SOLUTION INTRAVENOUS at 02:00

## 2021-06-17 RX ADMIN — SODIUM CHLORIDE 2.5 MILLION UNITS: 9 INJECTION, SOLUTION INTRAVENOUS at 10:40

## 2021-06-17 RX ADMIN — SODIUM CHLORIDE 2.5 MILLION UNITS: 9 INJECTION, SOLUTION INTRAVENOUS at 14:42

## 2021-06-17 RX ADMIN — SODIUM CHLORIDE, SODIUM LACTATE, POTASSIUM CHLORIDE, AND CALCIUM CHLORIDE 125 ML/HR: .6; .31; .03; .02 INJECTION, SOLUTION INTRAVENOUS at 18:37

## 2021-06-17 RX ADMIN — BUTORPHANOL TARTRATE 1 MG: 1 INJECTION, SOLUTION INTRAMUSCULAR; INTRAVENOUS at 13:40

## 2021-06-17 NOTE — PLAN OF CARE
Problem: Knowledge Deficit  Goal: Verbalizes understanding of labor plan  Description: Assess patient/family/caregiver's baseline knowledge level and ability to understand information  Provide education via patient/family/caregiver's preferred learning method at appropriate level of understanding  1  Provide teaching at level of understanding  2  Provide teaching via preferred learning method(s)  Outcome: Progressing  Goal: Patient/family/caregiver demonstrates understanding of disease process, treatment plan, medications, and discharge instructions  Description: Complete learning assessment and assess knowledge base  Interventions:  - Provide teaching at level of understanding  - Provide teaching via preferred learning methods  Outcome: Progressing     Problem: Labor & Delivery  Goal: Manages discomfort  Description: Assess and monitor for signs and symptoms of discomfort  Assess patient's pain level regularly and per hospital policy  Administer medications as ordered  Support use of nonpharmacological methods to help control pain such as distraction, imagery, relaxation, and application of heat and cold  Collaborate with interdisciplinary team and patient to determine appropriate pain management plan  1  Include patient in decisions related to comfort  2  Offer non-pharmacological pain management interventions  3  Report ineffective pain management to physician  Outcome: Progressing  Goal: Patient vital signs are stable  Description: 1  Assess vital signs - vaginal delivery    Outcome: Progressing     Problem: PAIN - ADULT  Goal: Verbalizes/displays adequate comfort level or baseline comfort level  Description: Interventions:  - Encourage patient to monitor pain and request assistance  - Assess pain using appropriate pain scale  - Administer analgesics based on type and severity of pain and evaluate response  - Implement non-pharmacological measures as appropriate and evaluate response  - Consider cultural and social influences on pain and pain management  - Notify physician/advanced practitioner if interventions unsuccessful or patient reports new pain  Outcome: Progressing     Problem: INFECTION - ADULT  Goal: Absence or prevention of progression during hospitalization  Description: INTERVENTIONS:  - Assess and monitor for signs and symptoms of infection  - Monitor lab/diagnostic results  - Monitor all insertion sites, i e  indwelling lines, tubes, and drains  - Monitor endotracheal if appropriate and nasal secretions for changes in amount and color  - [facility] appropriate cooling/warming therapies per order  - Administer medications as ordered  - Instruct and encourage patient and family to use good hand hygiene technique  - Identify and instruct in appropriate isolation precautions for identified infection/condition  Outcome: Progressing  Goal: Absence of fever/infection during neutropenic period  Description: INTERVENTIONS:  - Monitor WBC    Outcome: Progressing     Problem: SAFETY ADULT  Goal: Patient will remain free of falls  Description: INTERVENTIONS:  - Educate patient/family on patient safety including physical limitations  - Instruct patient to call for assistance with activity   - Consult OT/PT to assist with strengthening/mobility   - Keep Call bell within reach  - Keep bed low and locked with side rails adjusted as appropriate  - Keep care items and personal belongings within reach  - Initiate and maintain comfort rounds  - Make Fall Risk Sign visible to staff  - Obtain necessary fall risk management equipment:   - Apply yellow socks and bracelet for high fall risk patients  - Consider moving patient to room near nurses station  Outcome: Progressing  Goal: Maintain or return to baseline ADL function  Description: INTERVENTIONS:  -  Assess patient's ability to carry out ADLs; assess patient's baseline for ADL function and identify physical deficits which impact ability to perform ADLs (bathing, care of mouth/teeth, toileting, grooming, dressing, etc )  - Assess/evaluate cause of self-care deficits   - Assess range of motion  - Assess patient's mobility; develop plan if impaired  - Assess patient's need for assistive devices and provide as appropriate  - Encourage maximum independence but intervene and supervise when necessary  - Involve family in performance of ADLs  - Assess for home care needs following discharge   - Consider OT consult to assist with ADL evaluation and planning for discharge  - Provide patient education as appropriate  Outcome: Progressing  Goal: Maintains/Returns to pre admission functional level  Description: INTERVENTIONS:  - Perform BMAT or MOVE assessment daily    - Set and communicate daily mobility goal to care team and patient/family/caregiver  - Collaborate with rehabilitation services on mobility goals if consulted  - Reposition patient every 2 hours    - Ambulate patient   - Out of bed for toileting  - Record patient progress and toleration of activity level   Outcome: Progressing     Problem: DISCHARGE PLANNING  Goal: Discharge to home or other facility with appropriate resources  Description: INTERVENTIONS:  - Identify barriers to discharge w/patient and caregiver  - Arrange for needed discharge resources and transportation as appropriate  - Identify discharge learning needs (meds, wound care, etc )  - Arrange for interpretive services to assist at discharge as needed  - Refer to Case Management Department for coordinating discharge planning if the patient needs post-hospital services based on physician/advanced practitioner order or complex needs related to functional status, cognitive ability, or social support system  Outcome: Progressing

## 2021-06-17 NOTE — OB LABOR/OXYTOCIN SAFETY PROGRESS
Oxytocin Safety Progress Check Note - Alia Puente 27 y o  female MRN: [id number]    Unit/Bed#: -01 Encounter: [id number]    Dose (yasmany-units/min) Oxytocin: 0 yasmany-units/min  Contraction Frequency (minutes): 2-2 5  Contraction Quality: Moderate  Tachysystole: No   Cervical Dilation: 1        Cervical Effacement: 0  Fetal Station: Ballotable  Baseline Rate: 130 bpm  Fetal Heart Rate: 131 BPM  FHR Category: Category I               Vital Signs:   Vitals:    06/17/21 1717   BP: 129/83   Pulse: 95   Resp:    Temp:    SpO2:            Notes/comments: The patient got up to go to the bathroom and upon returning, whilst sitting on the edge of the bed, the patient had a deceleration  She was moved onto her side and moved from side to side  Pitocin was turned off  FHT returned to baseline after 3 minutes  The patient refused cervical exam at that time  We agreed that she could take some time to "cool off"  Will plan to reassess and likely remove Ceja balloon       Category 2 tracing, prolonged 3 minute deceleration        Sanam Hunter MD 6/17/2021 5:22 PM

## 2021-06-17 NOTE — PLAN OF CARE
Problem: Knowledge Deficit  Goal: Verbalizes understanding of labor plan  Description: Assess patient/family/caregiver's baseline knowledge level and ability to understand information  Provide education via patient/family/caregiver's preferred learning method at appropriate level of understanding  1  Provide teaching at level of understanding  2  Provide teaching via preferred learning method(s)  Outcome: Progressing  Goal: Patient/family/caregiver demonstrates understanding of disease process, treatment plan, medications, and discharge instructions  Description: Complete learning assessment and assess knowledge base  Interventions:  - Provide teaching at level of understanding  - Provide teaching via preferred learning methods  Outcome: Progressing     Problem: Labor & Delivery  Goal: Manages discomfort  Description: Assess and monitor for signs and symptoms of discomfort  Assess patient's pain level regularly and per hospital policy  Administer medications as ordered  Support use of nonpharmacological methods to help control pain such as distraction, imagery, relaxation, and application of heat and cold  Collaborate with interdisciplinary team and patient to determine appropriate pain management plan  1  Include patient in decisions related to comfort  2  Offer non-pharmacological pain management interventions  3  Report ineffective pain management to physician  Outcome: Progressing  Goal: Patient vital signs are stable  Description: 1  Assess vital signs - vaginal delivery    Outcome: Progressing     Problem: PAIN - ADULT  Goal: Verbalizes/displays adequate comfort level or baseline comfort level  Description: Interventions:  - Encourage patient to monitor pain and request assistance  - Assess pain using appropriate pain scale  - Administer analgesics based on type and severity of pain and evaluate response  - Implement non-pharmacological measures as appropriate and evaluate response  - Consider cultural and social influences on pain and pain management  - Notify physician/advanced practitioner if interventions unsuccessful or patient reports new pain  Outcome: Progressing     Problem: INFECTION - ADULT  Goal: Absence or prevention of progression during hospitalization  Description: INTERVENTIONS:  - Assess and monitor for signs and symptoms of infection  - Monitor lab/diagnostic results  - Monitor all insertion sites, i e  indwelling lines, tubes, and drains  - Monitor endotracheal if appropriate and nasal secretions for changes in amount and color  - [place] appropriate cooling/warming therapies per order  - Administer medications as ordered  - Instruct and encourage patient and family to use good hand hygiene technique  - Identify and instruct in appropriate isolation precautions for identified infection/condition  Outcome: Progressing  Goal: Absence of fever/infection during neutropenic period  Description: INTERVENTIONS:  - Monitor WBC    Outcome: Progressing     Problem: SAFETY ADULT  Goal: Patient will remain free of falls  Description: INTERVENTIONS:  - Educate patient/family on patient safety including physical limitations  - Instruct patient to call for assistance with activity   - Consult OT/PT to assist with strengthening/mobility   - Keep Call bell within reach  - Keep bed low and locked with side rails adjusted as appropriate  - Keep care items and personal belongings within reach  - Initiate and maintain comfort rounds  - Make Fall Risk Sign visible to staff  - Offer Toileting every 2 Hours, in advance of need  - Initiate/Maintain alarm  - Obtain necessary fall risk management equipment  - Apply yellow socks and bracelet for high fall risk patients  - Consider moving patient to room near nurses station  Outcome: Progressing  Goal: Maintain or return to baseline ADL function  Description: INTERVENTIONS:  -  Assess patient's ability to carry out ADLs; assess patient's baseline for ADL function and identify physical deficits which impact ability to perform ADLs (bathing, care of mouth/teeth, toileting, grooming, dressing, etc )  - Assess/evaluate cause of self-care deficits   - Assess range of motion  - Assess patient's mobility; develop plan if impaired  - Assess patient's need for assistive devices and provide as appropriate  - Encourage maximum independence but intervene and supervise when necessary  - Involve family in performance of ADLs  - Assess for home care needs following discharge   - Consider OT consult to assist with ADL evaluation and planning for discharge  - Provide patient education as appropriate  Outcome: Progressing     Problem: DISCHARGE PLANNING  Goal: Discharge to home or other facility with appropriate resources  Description: INTERVENTIONS:  - Identify barriers to discharge w/patient and caregiver  - Arrange for needed discharge resources and transportation as appropriate  - Identify discharge learning needs (meds, wound care, etc )  - Arrange for interpretive services to assist at discharge as needed  - Refer to Case Management Department for coordinating discharge planning if the patient needs post-hospital services based on physician/advanced practitioner order or complex needs related to functional status, cognitive ability, or social support system  Outcome: Progressing

## 2021-06-17 NOTE — OB LABOR/OXYTOCIN SAFETY PROGRESS
Labor Progress Note - Daysi Edy 27 y o  female MRN: [id number]    Unit/Bed#:  203-01 Encounter: [id number]       Contraction Frequency (minutes): 5-6  Contraction Quality: Mild  Tachysystole: No   Cervical Dilation: Closed        Cervical Effacement: 0  Fetal Station: Ballotable  Baseline Rate: 135 bpm  Fetal Heart Rate: 168 BPM  FHR Category: Category II               Vital Signs:   Vitals:    06/17/21 0249   BP: 125/80   Pulse: 99   Resp: 18   Temp:            Notes/comments: The patient had a 3 min deceleration to the 60s after she positions herself to the left side, when she was previously healing on her back  We started a fluid bolus and reposition the patient on her back  The tracing resolved after the patient was repositioned onto her back  The baseline is now 110 from 135 prior to the deceleration    Discussed with Dr Harish Shin Will continue to monitor closely    Laura Huerta MD 6/17/2021 3:49 AM

## 2021-06-17 NOTE — H&P
[address] Moran 27 y o  female MRN: [id number]  Unit/Bed#: -01 Encounter: [id number]      Assessment: 27 y o  Shaan Cole at 40w4d admitted for eIOL  SVE: closed/thick/high  FHT: 140s, reactive  Clinical EFW: 7 ; Vertex confirmed by US  GBS status: positive     Plan:   · Admit  · CBC, RPR, Type & Screen  · Analgesia at maternal request  · Start induction with cytotec  · PCN for GBS prophylaxis    Dr Rahat Arcos aware      SUBJECTIVE:    Chief Complaint: here for my induction    HPI: Snajuana Nicolas is a 27 y o  Shaan Cole with an BILL of 2021, by Last Menstrual Period at 40w4d who is being admitted for eIOL  She denies having uterine contractions, has no LOF, and reports no VB  She states she has felt good FM  Phil Ingles Pregnancy complications: obesity (BMI 43)    Baby complications/comments: none    Patient Active Problem List   Diagnosis    Pregnancy, obstetrical care, third trimester    Pruritus    Excessive weight gain during pregnancy in third trimester    Obesity affecting pregnancy in third trimester    Encounter for supervision of normal first pregnancy in third trimester       OB History    Para Term  AB Living   1 0 0 0 0 0   SAB TAB Ectopic Multiple Live Births   0 0 0 0 0      # Outcome Date GA Lbr Satnam/2nd Weight Sex Delivery Anes PTL Lv   1 Current                Past Medical History:   Diagnosis Date    Varicella     got vaccine       History reviewed  No pertinent surgical history      Social History     Tobacco Use    Smoking status: Never Smoker    Smokeless tobacco: Never Used   Substance Use Topics    Alcohol use: Not Currently       No Known Allergies    Medications Prior to Admission   Medication    Prenatal MV-Min-Fe Fum-FA-DHA (PRENATAL 1 PO)    aspirin 81 mg chewable tablet    nystatin (MYCOSTATIN) cream           OBJECTIVE:  Vitals:  Temp:  [98 1 °F (36 7 °C)] 98 1 °F (36 7 °C)  HR:  [] 90  Resp:  [20] 20  BP: (107-140)/(61-85) 107/61  Body mass index is 41 66 kg/m²  Physical Exam:  Physical Exam  Constitutional:       Appearance: Normal appearance  HENT:      Head: Normocephalic and atraumatic  Eyes:      Extraocular Movements: Extraocular movements intact  Cardiovascular:      Rate and Rhythm: Normal rate and regular rhythm  Pulmonary:      Effort: Pulmonary effort is normal       Breath sounds: Normal breath sounds  Abdominal:      Comments: Gravid   Musculoskeletal:         General: Normal range of motion  Neurological:      Mental Status: She is alert and oriented to person, place, and time  Skin:     General: Skin is warm     Psychiatric:         Mood and Affect: Mood normal          Behavior: Behavior normal             Lab Results   Component Value Date    WBC 8 37 06/16/2021    HGB 10 9 (L) 06/16/2021    HCT 33 3 (L) 06/16/2021     06/16/2021     Lab Results   Component Value Date    K 4 1 05/17/2021     05/17/2021    CO2 25 05/17/2021    BUN 12 05/17/2021    CREATININE 0 72 05/17/2021    AST 19 05/17/2021    ALT 18 05/17/2021       Prenatal Labs   Blood type: B+  Antibody: negative  Group B strep: positive  HIV: negative  Hepatitis B: negative  RPR: non-reactive  Rubella: Immune  Varicella: Unknown  1 hour Glucose: 85    >2 Midnights  INPATIENT       Na Glover MD  PGY-1 OB/GYN   6/16/2021 11:12 PM

## 2021-06-17 NOTE — PLAN OF CARE
Problem: Knowledge Deficit  Goal: Verbalizes understanding of labor plan  Description: Assess patient/family/caregiver's baseline knowledge level and ability to understand information  Provide education via patient/family/caregiver's preferred learning method at appropriate level of understanding  1  Provide teaching at level of understanding  2  Provide teaching via preferred learning method(s)  Outcome: Progressing  Goal: Patient/family/caregiver demonstrates understanding of disease process, treatment plan, medications, and discharge instructions  Description: Complete learning assessment and assess knowledge base  Interventions:  - Provide teaching at level of understanding  - Provide teaching via preferred learning methods  Outcome: Progressing     Problem: Labor & Delivery  Goal: Manages discomfort  Description: Assess and monitor for signs and symptoms of discomfort  Assess patient's pain level regularly and per hospital policy  Administer medications as ordered  Support use of nonpharmacological methods to help control pain such as distraction, imagery, relaxation, and application of heat and cold  Collaborate with interdisciplinary team and patient to determine appropriate pain management plan  1  Include patient in decisions related to comfort  2  Offer non-pharmacological pain management interventions  3  Report ineffective pain management to physician  Outcome: Progressing  Goal: Patient vital signs are stable  Description: 1  Assess vital signs - vaginal delivery    Outcome: Progressing     Problem: PAIN - ADULT  Goal: Verbalizes/displays adequate comfort level or baseline comfort level  Description: Interventions:  - Encourage patient to monitor pain and request assistance  - Assess pain using appropriate pain scale  - Administer analgesics based on type and severity of pain and evaluate response  - Implement non-pharmacological measures as appropriate and evaluate response  - Consider cultural and social influences on pain and pain management  - Notify physician/advanced practitioner if interventions unsuccessful or patient reports new pain  Outcome: Progressing     Problem: INFECTION - ADULT  Goal: Absence or prevention of progression during hospitalization  Description: INTERVENTIONS:  - Assess and monitor for signs and symptoms of infection  - Monitor lab/diagnostic results  - Monitor all insertion sites, i e  indwelling lines, tubes, and drains  - Monitor endotracheal if appropriate and nasal secretions for changes in amount and color  - [place] appropriate cooling/warming therapies per order  - Administer medications as ordered  - Instruct and encourage patient and family to use good hand hygiene technique  - Identify and instruct in appropriate isolation precautions for identified infection/condition  Outcome: Progressing  Goal: Absence of fever/infection during neutropenic period  Description: INTERVENTIONS:  - Monitor WBC    Outcome: Progressing     Problem: SAFETY ADULT  Goal: Patient will remain free of falls  Description: INTERVENTIONS:  - Educate patient/family on patient safety including physical limitations  - Instruct patient to call for assistance with activity   - Consult OT/PT to assist with strengthening/mobility   - Keep Call bell within reach  - Keep bed low and locked with side rails adjusted as appropriate  - Keep care items and personal belongings within reach  - Initiate and maintain comfort rounds  - Make Fall Risk Sign visible to staff  - Offer Toileting as needed   - Obtain necessary fall risk management equipment:   - Apply yellow socks and bracelet for high fall risk patients  - Consider moving patient to room near nurses station  Outcome: Progressing  Goal: Maintain or return to baseline ADL function  Description: INTERVENTIONS:  -  Assess patient's ability to carry out ADLs; assess patient's baseline for ADL function and identify physical deficits which impact ability to perform ADLs (bathing, care of mouth/teeth, toileting, grooming, dressing, etc )  - Assess/evaluate cause of self-care deficits   - Assess range of motion  - Assess patient's mobility; develop plan if impaired  - Assess patient's need for assistive devices and provide as appropriate  - Encourage maximum independence but intervene and supervise when necessary  - Involve family in performance of ADLs  - Assess for home care needs following discharge   - Consider OT consult to assist with ADL evaluation and planning for discharge  - Provide patient education as appropriate  Outcome: Progressing  Goal: Maintains/Returns to pre admission functional level  Description: INTERVENTIONS:  - Perform BMAT or MOVE assessment daily    - Set and communicate daily mobility goal to care team and patient/family/caregiver     - Collaborate with rehabilitation services on mobility goals if consulted  - Perform Range of Motion  - Reposition patient  - Dangle patient   - Stand patient   - Ambulate patient as needed  - Out of bed to chair as needed   - Out of bed for meals   - Out of bed for toileting  - Record patient progress and toleration of activity level   Outcome: Progressing     Problem: DISCHARGE PLANNING  Goal: Discharge to home or other facility with appropriate resources  Description: INTERVENTIONS:  - Identify barriers to discharge w/patient and caregiver  - Arrange for needed discharge resources and transportation as appropriate  - Identify discharge learning needs (meds, wound care, etc )  - Arrange for interpretive services to assist at discharge as needed  - Refer to Case Management Department for coordinating discharge planning if the patient needs post-hospital services based on physician/advanced practitioner order or complex needs related to functional status, cognitive ability, or social support system  Outcome: Progressing

## 2021-06-17 NOTE — OB LABOR/OXYTOCIN SAFETY PROGRESS
Labor Progress Note - Linda Donovan 27 y o  female MRN: [id number]    Unit/Bed#:  203-01 Encounter: [id number]       Contraction Frequency (minutes): 1 5-2 5  Contraction Quality: Mild, Moderate  Tachysystole: No   Cervical Dilation: Closed        Cervical Effacement: 0  Fetal Station: Ballotable  Baseline Rate: 140 bpm  Fetal Heart Rate: 142 BPM  FHR Category: Category I               Vital Signs:   Vitals:    06/17/21 0811   BP: 136/94   Pulse: 97   Resp:    Temp:    SpO2:            Notes/comments:   Fetus had 5 min deceleration, patient was placed on left side and tones returned to baseline of 120  Patient declined SVE, feels as if moon is still in the same place      Amber Person DO 6/17/2021 8:59 AM

## 2021-06-17 NOTE — OB LABOR/OXYTOCIN SAFETY PROGRESS
Labor Progress Note - Charly Levin 27 y o  female MRN: [id number]    Unit/Bed#: -01 Encounter: [id number]       Contraction Frequency (minutes): irritability  Contraction Quality: Mild  Tachysystole: No   Cervical Dilation: Closed        Cervical Effacement: 0  Fetal Station: Ballotable  Baseline Rate: 140 bpm  Fetal Heart Rate: 146 BPM  FHR Category: Category I               Vital Signs:   Vitals:    06/17/21 0606   BP: 138/93   Pulse: 87   Resp: 20   Temp:    SpO2:            Notes/comments:    Talya did not have cervical change from the cytotec  Link balloon was placed at this time with bright red bleeding after placement  Most likely this bleeding is from manual cervical dilation with the FB as the placenta is not low lying and she continues to have a category 1 FHT  Will continue to monitor bleeding at this time and leave FB in place  PROCEDURE:  LINK BALLOON PLACEMENT    A 24F link with a 30cc balloon was selected  SVE was performed and cervix was located and noted to be closed  Speculum exam was performed and link was introduced into the external os with ringed forceps  Balloon advanced through cervix beyond the internal cervical os  A small amount amount of sterile saline solution was instilled in the balloon to confirm placement  Placement was confirmed to be beyond the internal cervical os  A total of 60cc of sterile saline solution was placed into the balloon  Pt tolerated well  Instructions left with RN to place link to gravity with a 1L bag of IV fluid  Notify MD when link dislodged  Nathalie Ferreira MD  OB/GYN  6/17/2021  6:30 AM            Karan Sharp MD 6/17/2021 6:28 AM

## 2021-06-17 NOTE — OB LABOR/OXYTOCIN SAFETY PROGRESS
Oxytocin Safety Progress Check Note - Yarely Arcos 27 y o  female MRN: [id number]    Unit/Bed#: -01 Encounter: [id number]    Dose (yasmany-units/min) Oxytocin: 0 yasmany-units/min  Contraction Frequency (minutes): 2 5-4 5  Contraction Quality: Mild  Tachysystole: No   Cervical Dilation: 2        Cervical Effacement: 0  Fetal Station: Ballotable  Baseline Rate: 150 bpm  Fetal Heart Rate: 153 BPM  FHR Category: Category I               Vital Signs:   Vitals:    06/17/21 1828   BP: 127/82   Pulse:    Resp:    Temp:    SpO2:            Notes/comments:    Talya has requested that her moon balloon be removed  She has been very uncomfortable with the balloon in place  The balloon was deflated and cervical exam was changed  Membranes were swept  Will restart pitocin as it has been over 1 hour since last decelerations  Will continue to monitor and reassess as indicated   Discussed with Dr Renny Flowers MD 6/17/2021 6:34 PM

## 2021-06-18 LAB
ALBUMIN SERPL BCP-MCNC: 2.5 G/DL (ref 3.5–5)
ALP SERPL-CCNC: 145 U/L (ref 46–116)
ALT SERPL W P-5'-P-CCNC: 13 U/L (ref 12–78)
ANION GAP SERPL CALCULATED.3IONS-SCNC: 11 MMOL/L (ref 4–13)
AST SERPL W P-5'-P-CCNC: 19 U/L (ref 5–45)
BILIRUB SERPL-MCNC: 0.28 MG/DL (ref 0.2–1)
BUN SERPL-MCNC: 7 MG/DL (ref 5–25)
CALCIUM ALBUM COR SERPL-MCNC: 9.9 MG/DL (ref 8.3–10.1)
CALCIUM SERPL-MCNC: 8.7 MG/DL (ref 8.3–10.1)
CHLORIDE SERPL-SCNC: 105 MMOL/L (ref 100–108)
CO2 SERPL-SCNC: 22 MMOL/L (ref 21–32)
CREAT SERPL-MCNC: 0.8 MG/DL (ref 0.6–1.3)
ERYTHROCYTE [DISTWIDTH] IN BLOOD BY AUTOMATED COUNT: 15.1 % (ref 11.6–15.1)
GFR SERPL CREATININE-BSD FRML MDRD: 119 ML/MIN/1.73SQ M
GLUCOSE SERPL-MCNC: 90 MG/DL (ref 65–140)
HCT VFR BLD AUTO: 36 % (ref 34.8–46.1)
HGB BLD-MCNC: 11.3 G/DL (ref 11.5–15.4)
MAGNESIUM SERPL-MCNC: 3.7 MG/DL (ref 1.6–2.6)
MCH RBC QN AUTO: 26.5 PG (ref 26.8–34.3)
MCHC RBC AUTO-ENTMCNC: 31.4 G/DL (ref 31.4–37.4)
MCV RBC AUTO: 85 FL (ref 82–98)
PLATELET # BLD AUTO: 233 THOUSANDS/UL (ref 149–390)
PMV BLD AUTO: 10.8 FL (ref 8.9–12.7)
POTASSIUM SERPL-SCNC: 4 MMOL/L (ref 3.5–5.3)
PROT SERPL-MCNC: 6.9 G/DL (ref 6.4–8.2)
RBC # BLD AUTO: 4.26 MILLION/UL (ref 3.81–5.12)
SODIUM SERPL-SCNC: 138 MMOL/L (ref 136–145)
URATE SERPL-MCNC: 5.3 MG/DL (ref 2–6.8)
WBC # BLD AUTO: 11.6 THOUSAND/UL (ref 4.31–10.16)

## 2021-06-18 PROCEDURE — 85027 COMPLETE CBC AUTOMATED: CPT | Performed by: OBSTETRICS & GYNECOLOGY

## 2021-06-18 PROCEDURE — 84550 ASSAY OF BLOOD/URIC ACID: CPT | Performed by: OBSTETRICS & GYNECOLOGY

## 2021-06-18 PROCEDURE — 80053 COMPREHEN METABOLIC PANEL: CPT | Performed by: OBSTETRICS & GYNECOLOGY

## 2021-06-18 PROCEDURE — 83735 ASSAY OF MAGNESIUM: CPT | Performed by: OBSTETRICS & GYNECOLOGY

## 2021-06-18 PROCEDURE — NC001 PR NO CHARGE: Performed by: OBSTETRICS & GYNECOLOGY

## 2021-06-18 RX ORDER — PROMETHAZINE HYDROCHLORIDE 25 MG/ML
12.5 INJECTION, SOLUTION INTRAMUSCULAR; INTRAVENOUS ONCE
Status: COMPLETED | OUTPATIENT
Start: 2021-06-18 | End: 2021-06-18

## 2021-06-18 RX ORDER — MAGNESIUM SULFATE HEPTAHYDRATE 40 MG/ML
4 INJECTION, SOLUTION INTRAVENOUS ONCE
Status: COMPLETED | OUTPATIENT
Start: 2021-06-18 | End: 2021-06-18

## 2021-06-18 RX ORDER — LABETALOL 20 MG/4 ML (5 MG/ML) INTRAVENOUS SYRINGE
40 ONCE
Status: DISCONTINUED | OUTPATIENT
Start: 2021-06-18 | End: 2021-06-18

## 2021-06-18 RX ORDER — BUTORPHANOL TARTRATE 1 MG/ML
1 INJECTION, SOLUTION INTRAMUSCULAR; INTRAVENOUS ONCE
Status: COMPLETED | OUTPATIENT
Start: 2021-06-18 | End: 2021-06-18

## 2021-06-18 RX ORDER — MAGNESIUM SULFATE HEPTAHYDRATE 40 MG/ML
2 INJECTION, SOLUTION INTRAVENOUS ONCE
Status: COMPLETED | OUTPATIENT
Start: 2021-06-18 | End: 2021-06-18

## 2021-06-18 RX ORDER — MAGNESIUM SULFATE HEPTAHYDRATE 40 MG/ML
1 INJECTION, SOLUTION INTRAVENOUS CONTINUOUS
Status: DISCONTINUED | OUTPATIENT
Start: 2021-06-18 | End: 2021-06-22 | Stop reason: HOSPADM

## 2021-06-18 RX ORDER — LABETALOL 20 MG/4 ML (5 MG/ML) INTRAVENOUS SYRINGE
20 ONCE
Status: COMPLETED | OUTPATIENT
Start: 2021-06-18 | End: 2021-06-18

## 2021-06-18 RX ADMIN — PROMETHAZINE HYDROCHLORIDE 12.5 MG: 25 INJECTION INTRAMUSCULAR; INTRAVENOUS at 12:30

## 2021-06-18 RX ADMIN — MAGNESIUM SULFATE HEPTAHYDRATE 4 G: 40 INJECTION, SOLUTION INTRAVENOUS at 18:18

## 2021-06-18 RX ADMIN — MAGNESIUM SULFATE HEPTAHYDRATE 2 G/HR: 40 INJECTION, SOLUTION INTRAVENOUS at 18:52

## 2021-06-18 RX ADMIN — SODIUM CHLORIDE, SODIUM LACTATE, POTASSIUM CHLORIDE, AND CALCIUM CHLORIDE 125 ML/HR: .6; .31; .03; .02 INJECTION, SOLUTION INTRAVENOUS at 16:22

## 2021-06-18 RX ADMIN — PROMETHAZINE HYDROCHLORIDE 12.5 MG: 25 INJECTION INTRAMUSCULAR; INTRAVENOUS at 17:03

## 2021-06-18 RX ADMIN — LABETALOL 20 MG/4 ML (5 MG/ML) INTRAVENOUS SYRINGE 20 MG: at 18:11

## 2021-06-18 RX ADMIN — SODIUM CHLORIDE 2.5 MILLION UNITS: 9 INJECTION, SOLUTION INTRAVENOUS at 10:35

## 2021-06-18 RX ADMIN — PROMETHAZINE HYDROCHLORIDE 12.5 MG: 25 INJECTION INTRAMUSCULAR; INTRAVENOUS at 22:12

## 2021-06-18 RX ADMIN — BUTORPHANOL TARTRATE 1 MG: 1 INJECTION, SOLUTION INTRAMUSCULAR; INTRAVENOUS at 17:04

## 2021-06-18 RX ADMIN — SODIUM CHLORIDE 2.5 MILLION UNITS: 9 INJECTION, SOLUTION INTRAVENOUS at 22:34

## 2021-06-18 RX ADMIN — SODIUM CHLORIDE 2.5 MILLION UNITS: 9 INJECTION, SOLUTION INTRAVENOUS at 18:34

## 2021-06-18 RX ADMIN — MAGNESIUM SULFATE HEPTAHYDRATE 2 G: 40 INJECTION, SOLUTION INTRAVENOUS at 18:42

## 2021-06-18 RX ADMIN — SODIUM CHLORIDE 2.5 MILLION UNITS: 9 INJECTION, SOLUTION INTRAVENOUS at 14:31

## 2021-06-18 RX ADMIN — SODIUM CHLORIDE 2.5 MILLION UNITS: 9 INJECTION, SOLUTION INTRAVENOUS at 02:33

## 2021-06-18 RX ADMIN — SODIUM CHLORIDE, SODIUM LACTATE, POTASSIUM CHLORIDE, AND CALCIUM CHLORIDE 125 ML/HR: .6; .31; .03; .02 INJECTION, SOLUTION INTRAVENOUS at 05:48

## 2021-06-18 RX ADMIN — BUTORPHANOL TARTRATE 1 MG: 1 INJECTION, SOLUTION INTRAMUSCULAR; INTRAVENOUS at 22:12

## 2021-06-18 RX ADMIN — BUTORPHANOL TARTRATE 1 MG: 1 INJECTION, SOLUTION INTRAMUSCULAR; INTRAVENOUS at 03:23

## 2021-06-18 RX ADMIN — SODIUM CHLORIDE 2.5 MILLION UNITS: 9 INJECTION, SOLUTION INTRAVENOUS at 06:35

## 2021-06-18 RX ADMIN — PROMETHAZINE HYDROCHLORIDE 12.5 MG: 25 INJECTION INTRAMUSCULAR; INTRAVENOUS at 03:33

## 2021-06-18 RX ADMIN — BUTORPHANOL TARTRATE 1 MG: 1 INJECTION, SOLUTION INTRAMUSCULAR; INTRAVENOUS at 12:30

## 2021-06-18 NOTE — OB LABOR/OXYTOCIN SAFETY PROGRESS
Oxytocin Safety Progress Check Note - Melani Flores 27 y o  female MRN: [id number]    Unit/Bed#: -01 Encounter: [id number]    Dose (yasmany-units/min) Oxytocin: 4 yasmany-units/min  Contraction Frequency (minutes): 3-3 5  Contraction Quality: Mild  Tachysystole: No   Cervical Dilation: 2        Cervical Effacement: 30  Fetal Station: -3  Baseline Rate: 135 bpm  Fetal Heart Rate: 132 BPM  FHR Category: Category I               Vital Signs:  Elevated BP likely secondary to bent arm; will retake  Vitals:    06/18/21 1402   BP: (!) 156/104   Pulse: 89   Resp:    Temp:    SpO2:            Notes/comments:   Patient on pitocin x almost 2 hours  FHR tracing category 1  Will defer cervical check at this time  I discussed transferring her care to the [facility] Dr Shaina Arcos at this time as she still does not want Dr Madisyn Gallagher to be her primary MD but I have administrative responsibilities that are requiring me to leave [facility]  She and her mother are comfortable with this plan  Dr Shaina Arcos came to the room and met the patient and her mother  We also discussed the possibility of Dr Madisyn Gallagher needing to assist Dr Shaina Arcos in the event of an emergency surgery  They said they were comfortable with that provided that Dr Shaina Arcos was the primary surgeon  All questions were answered  Continue with pitocin titration        Valerie De MD 6/18/2021 2:18 PM

## 2021-06-18 NOTE — PROGRESS NOTES
I returned to the room to discuss the plan of care with the patient  She is resting in bed and reports feeling extremely tired  She does not feel that her contractions are more painful though she is not specifically keeping track of whether they are any different than they were before  When I discussed the idea of restarting pitocin, I reviewed that I would want to discuss the possibility of fetal intolerance to pitocin  I discussed that the first step would always be to turn the pitocin down or off  However, if that was not effective, the next step would be to proceed with   Given her previous reluctance to accept , I stated that I would want to review any questions or concerns that she would have ahead of starting the pitocin so that if there was an emergency, we would all be on the same page  She and her partner stated that their largest concern with  is the fact that she has significant anxiety and would not be able to tolerate a  awake  It was their impression that this would be the only option if the  was less than emergent  I reviewed that we would normally prefer to do surgery under regional anesthesia for a few reasons including that the patient is awake and has memories of their baby's birth, lower maternal risk with regional vs general anesthesia and lower  risk with regional vs general anesthesia  They state understanding of all of this  Adiel Edwards still feels that she would not be able to handle that many people around her and that she would have a panic attack  Given that, I think it would be reasonable to start with general anesthesia  I discussed that we would review this with the anesthesia doctor on call as well  Her partner then asked if he would be able to check on her during the surgery  I reviewed the reasons why partners are not usually in the OR during surgery done under general anesthesia    He does not want to be present the entire time but state he had surgery when he was 14 or 18 and that his father was allowed to peek into the OR during the surgery to see that his vital signs were okay  I said I would have no problem with him peeking into the OR when he comes to the infant stabilization room to see the infant but that I would also need to confirm this with anesthesia  (The anesthesiologist stated that as long as this is not during intubation that he is okay with this as well )  After this discussion of  section, she states that she would be willing to accept a  for fetal intolerance under general anesthesia  She also requested to receive stadol and phenergan (which I now realize is what she was earlier referring to as the "crackhead medicine") when the pitocin starts so she can get some rest   I have ordered this  All questions were answered, and the patient and her family were in agreement with the plan of care  Will start pitocin now  Charge RN, bedside RN and residents are aware of the plan

## 2021-06-18 NOTE — UTILIZATION REVIEW
Initial Clinical Review    Admission: Date/Time/Statement:   Admission Orders (From admission, onward)     Ordered        06/16/21 2058  Inpatient Admission  Once                   Orders Placed This Encounter   Procedures    Inpatient Admission     Standing Status:   Standing     Number of Occurrences:   1     Order Specific Question:   Level of Care     Answer:   Med Surg [16]     Order Specific Question:   Estimated length of stay     Answer:   More than 2 Midnights     Order Specific Question:   Certification     Answer:   I certify that inpatient services are medically necessary for this patient for a duration of greater than two midnights  See H&P and MD Progress Notes for additional information about the patient's course of treatment  Chief Complaint   Patient presents with    Scheduled Induction       Initial Presentation:  26 to G 1 presented to L&D as inpatient for IOL @ 40 4/7 wksSVE: closed/thick/high  FHT: 140s, reactive Plan cytotec, IVF, continuous external monitoring     06-17-21   @ 0350  Contraction Frequency (minutes): 5-6  Contraction Quality: Mild  Tachysystole: No   Cervical Dilation: Closed  Cervical Effacement: 0  Fetal Station: Ballotable  Baseline Rate: 135 bpm  Fetal Heart Rate: 168 BPM  FHR Category: Category II  3 min deceleration to the 60s  Recovered with change in position and IV fluid bolus    @ 0630  No change noted moon balloon inserted     @ 0900  Contraction Frequency (minutes): 1 5-2 5  Contraction Quality: Mild, Moderate  Tachysystole: No   Cervical Dilation: Closed  Cervical Effacement: 0  Fetal Station: Ballotable  Baseline Rate: 140 bpm  Fetal Heart Rate: 142 BPM  FHR Category: Category I  Fetus had 5 min deceleration, patient was placed on left side and tones returned to baseline of 120   Patient declined SVE    @ 1730  Cervical Dilation: 1  Cervical Effacement: 0  Fetal Station: Ballotable    @ 2100  Cervical Dilation: 2  Cervical Effacement: 0  Fetal Station: Ballotable  Baseline Rate: 150 bpm  Fetal Heart Rate: 144 BPM  FHR Category: Category I     06-18-21 @ 0600    IVF, IV pitocin started continuous external monitor     Dose (yasmany-units/min) Oxytocin: 6 yasmany-units/min  Contraction Frequency (minutes): 2 5-3  Contraction Quality: Moderate  Tachysystole: No   Cervical Dilation: 2  Cervical Effacement: 30  Fetal Station: -3  Baseline Rate: 135 bpm  Fetal Heart Rate: 138 BPM  FHR Category: Category I    @ 0720  tearful at times during the night and upset with her induction of labor  The patient stated that "she never wanted to be induced in the first place and was told that she had to because her fluid was low" which "scared her into an induction"  The patient declined having an epidural and said that the the Pitocin was "too much to handle"  She did receive Stadol for pain relief  She stated that "she would rather go home"  She refused any internal checks    0830  Pt agitated and upset that she wants her pitocin off and "no one is listening to her " RN stopped pitocin at 0703- MD made aware  Pt states she did not want this induction and was "scared into it because of the babies low fluid " She states she wants to "go home and labor at home " She states she "only signed the induction paper for 42 weeks " After 42 weeks pt would agree to all medically necessary interventions  Patients mother and boyfriend upset as well over induction process and states that "no one explained what would happen or that it could be 4 day    @1029  Dr patient boyfriend and patient mother discussed plan of care  felt that her contractions were happening every 45 seconds on pitocin which concerned her  She, her partner and her mother all understand that the decelerations in the fetal heart rate were occurring but feel that it was due to maternal positioning and not an underlying problem    We all reviewed the current FHR tracing which is category 1 with a baseline of 155, moderate variability, +accels, -decels  We briefly discussed the concept of shared decision making as well as refusal of treatment  At this time, they are not refusing all treatment  She understands that it can take days to weeks for spontaneous labor to start even at 36 6/7wks and that I do not recommend waiting beyond 41 weeks given the increased risk of stillbirth at that time  She is willing to be monitored while she waits  Her partner acknowledged that if it becomes clear that a  is the safest thing for the baby, they would be willing to accept it  However, she is adament that she does not want a  at this time which I think is reasonable given that the FHR tracing has returned to category 1  She would like to wait for about two hours to see if she has made any progress toward labor on her own  If she has not made progress at that time, we will discuss restarting the pitocin  She would prefer to avoid cervical checks if possible given that they are painful for her  We discussed that we could restart pitocin without a repeat cervical check if she wishes as it is unlikely that she will have made change if she does not feel that her contractions are any stronger at that time    She does understand that she will need cervical checks to assess for cervical change along the way but that we can try to keep them to a minimum    Admitting  Vitals   Temperature Pulse Respirations Blood Pressure SpO2   21 0344   98 1 °F (36 7 °C) 99 20 140/68 100 %      Temp Source Heart Rate Source Patient Position - Orthostatic VS BP Location FiO2 (%)   21 --   Oral Monitor Lying Left arm       Pain Score       21       No Pain          Wt Readings from Last 1 Encounters:   21 121 kg (266 lb)     Additional Vital Signs:   21 2339  --  95  20  126/60 Lying   21 2308  98 °F (36 7 °C)  90  18 107/61 Lying   06/16/21 2253  --  93  18  115/62 Lying   06/16/21 2238  --  102  18  137/85 Lying   06/16/21 2223  --  102  18  136/89 Lying       Pertinent Labs/Diagnostic Test Results:       Results from last 7 days   Lab Units 06/16/21  2134   WBC Thousand/uL 8 37   HEMOGLOBIN g/dL 10 9*   HEMATOCRIT % 33 3*   PLATELETS Thousands/uL 243         Results from last 7 days   Lab Units 06/17/21  0849   SODIUM mmol/L 140   POTASSIUM mmol/L 3 7   CHLORIDE mmol/L 105   CO2 mmol/L 21   ANION GAP mmol/L 14*   BUN mg/dL 11   CREATININE mg/dL 0 72   EGFR ml/min/1 73sq m 136   CALCIUM mg/dL 8 9     Results from last 7 days   Lab Units 06/17/21  0849   AST U/L 17   ALT U/L 13   ALK PHOS U/L 131*   TOTAL PROTEIN g/dL 7 1   ALBUMIN g/dL 2 8*   TOTAL BILIRUBIN mg/dL 0 16*         Results from last 7 days   Lab Units 06/17/21  0849   GLUCOSE RANDOM mg/dL 56*     Results from last 7 days   Lab Units 06/17/21  0849 06/15/21  1436   GLUCOSE UA   --  negative   PROTEIN UA   --  negative   CREATININE UR mg/dL 88 0  --    PROTEIN UR mg/dL 13  --    PROT/CREAT RATIO UR  0 15*  --          Past Medical History:   Diagnosis Date    Varicella     got vaccine     Present on Admission:   Obesity affecting pregnancy in third trimester      Admitting Diagnosis: 40 weeks gestation of pregnancy [Z3A 40]  Age/Sex: 27 y o  female  Admission Orders:  Scheduled Medications:  penicillin G, 2 5 Million Units, Intravenous, Q4H      Continuous IV Infusions:  lactated ringers, 125 mL/hr, Intravenous, Continuous  oxytocin, 1-30 yasmany-units/min, Intravenous, Titrated      PRN Meds:       IP CONSULT TO ANESTHESIOLOGY    Network Utilization Review Department  ATTENTION: Please call with any questions or concerns to [phone] and carefully listen to the prompts so that you are directed to the right person   All voicemails are confidential   Judith Santizo all requests for admission clinical reviews, approved or denied determinations and any other requests to dedicated fax number below belonging to the campus where the patient is receiving treatment   List of dedicated fax numbers for the Facilities:  [address] [place] [address] DENIALS (Administrative/Medical Necessity) [phone]   [address]  (Maternity/NICU/Pediatrics) [phone]   [address] [place] [address] [facility] [address] [id number] [address] [facility] [place] [address] [phone]

## 2021-06-18 NOTE — NURSING NOTE
Upon arrival to room Pt agitated and upset that she wants her pitocin off and "no one is listening to her " RN stopped pitocin at 0703- MD made aware  Pt states she did not want this induction and was "scared into it because of the babies low fluid " She states she wants to "go home and labor at home " She states she "only signed the induction paper for 42 weeks " After 42 weeks pt would agree to all medically necessary interventions  Patients mother and boyfriend upset as well over induction process and states that "no one explained what would happen or that it could be 4 days " Pt would like to labor on her own and tell staff when she feels the urge to push; she has declined further cervical checks at this time  Pt expressed that if her fluid was really low and something was wrong with the baby she would know  RN answered all questions at this time

## 2021-06-18 NOTE — OB LABOR/OXYTOCIN SAFETY PROGRESS
Oxytocin Safety Progress Check Note - Nico Ahumada 27 y o  female MRN: [id number]    Unit/Bed#: -01 Encounter: [id number]    Dose (yasmany-units/min) Oxytocin: 6 yasmany-units/min  Contraction Frequency (minutes): 2 5-3  Contraction Quality: Moderate  Tachysystole: No   Cervical Dilation: 2        Cervical Effacement: 30  Fetal Station: -3  Baseline Rate: 135 bpm  Fetal Heart Rate: 138 BPM  FHR Category: Category I               Vital Signs:   Vitals:    21 0632   BP: 132/75   Pulse: 101   Resp:    Temp:    SpO2:            Notes/comments:    Talya has declined all cervical exam overnight  She is currently on 6 of pit  Upon entrance into the room, patient stated adamantly that she did not want a cervical check  She states that she is frustrated with the process  FOB explained that he does not understand why the induction was necessary and that nothing is working  Patient states that she is in pain and does not wish to continue her induction at this time  She states that she is "done"  She declines an epidural  She states that she would like to go home at this time and wait for natural labor  FOB agrees  We reviewed that she is not a candidate for safe medical discharge given that she is 40w6d with spontaneous decelerations while on pitocin and while off pitocin  Patient has had a largely category 1 strip overnight but given her decelerations previously, it would not be in her baby's best interest  She states that baby is only unhappy because she is being induced and it is not natural  She states that she did not want "any of this"  We reviewed that we can continue pitocin but that it will continue to be painful without an epidural  She declines pitocin and is requesting that pitocin be turned off  Reviewed that she may have made changes overnight but it is impossible to tell without examining her  She declines  She was offered a  section which she also declined   Patient was given time to review her options with Dr Francesco Bhardwaj was contacted  Update 0641:     Patient was noted to have spontaneous decelerations to the 60s  I entered the patient's room due to concern for fetal safety  Patient was being counseled by Dr Lena Gutierrez at bedside  She again was expressing her frustrations regarding the IOL process and stating that her baby would be fine if not for the fact that we tried to induce  Patient was counseled that she does not currently have any medication on  Patient refused repositioning with decelerations  She states that she is "stressed"  Reviewed with patient the concerns for fetal safety given decelerations and patient still would like to go home  She declines IOL and C/S  We were asked to leave the room  Call was made to Dr Mike Garcia to discuss concerns and further management  He recommended having an additional attending  the patient regarding her options  If she wishes to leave AMA, she may do so despite the risks to her fetus  Will plan to recounsel patient with additional attending and again review her options       Anthony Phillips MD 6/18/2021 6:47 AM

## 2021-06-18 NOTE — OB LABOR/OXYTOCIN SAFETY PROGRESS
Labor Progress Note - Oumar Medico 27 y o  female MRN: [id number]    Unit/Bed#: -01 Encounter: [id number]    Dose (yasmany-units/min) Oxytocin: 6 yasmany-units/min  Contraction Frequency (minutes): 2-3 5  Contraction Quality: Mild  Tachysystole: No   Cervical Dilation: 2        Cervical Effacement: 30  Fetal Station: -3  Baseline Rate: 140 bpm  Fetal Heart Rate: 150 BPM  FHR Category: Category I           S/Comfortable  Vital Signs: 175/102    Vitals:    21 1732   BP: (!) 175/102   Pulse: 97   Resp:    Temp:    SpO2:      FHRT 145 moderate variability (+)accels, no decels  Cavour q2 5"      Notes/comments:   A/P  19yo  at 40 6 weeks gestational age with preeclampsia with severe features  (1) Preeclampsia with severe features  BP > 160/110, repeated  Discussed preeclampsia with Talya and her support people at length  Discussed plan of care  --> Repeat labs now   --> Magnesium sulfate  --> Labetalol 20mg IV x 1  --> Continue to move toward delivery  (2) Fetal status category I   --> Continuous fetal monitoring         Vishnu Smith MD 2021 5:46 PM

## 2021-06-18 NOTE — OB LABOR/OXYTOCIN SAFETY PROGRESS
Oxytocin Safety Progress Check Note - Jaskaran Mancilla 27 y o  female MRN: [id number]    Unit/Bed#: -01 Encounter: [id number]    Dose (yasmany-units/min) Oxytocin: 0 yasmany-units/min  Contraction Frequency (minutes): 2-3  Contraction Quality: Moderate  Tachysystole: No   Cervical Dilation: 2        Cervical Effacement: 30  Fetal Station: -3  Baseline Rate: 145 bpm  Fetal Heart Rate: 148 BPM  FHR Category: Category I               Vital Signs:   Vitals:    06/17/21 2347   BP: 120/77   Pulse: 99   Resp: 18   Temp:    SpO2:            Notes/comments:   Talya is uncomfortable with her contractions  Cervical exam is unchanged  Will allow for break from pitocin  Patient may have a snack and shower  Cat 1 strip  Will restart pitocin at 0100 at 2mu/min  Patient and Dr April Dueñas agree with this plan          Becky Arango MD 6/18/2021 12:29 AM

## 2021-06-18 NOTE — OB LABOR/OXYTOCIN SAFETY PROGRESS
Oxytocin Safety Progress Check Note - Caleb Saldana 27 y o  female MRN: [id number]    Unit/Bed#: -01 Encounter: [id number]    Dose (yasmany-units/min) Oxytocin: 8 yasmany-units/min  Contraction Frequency (minutes): 2-4  Contraction Quality: Moderate  Tachysystole: No   Cervical Dilation: 2        Cervical Effacement: 0  Fetal Station: Ballotable  Baseline Rate: 150 bpm  Fetal Heart Rate: 136 BPM  FHR Category: Category I               Vital Signs:   Vitals:    06/17/21 2215   BP: 136/91   Pulse: 96   Resp: 20   Temp:    SpO2:            Notes/comments: The patient declines an SVE at this time  Cat I tracing  Will check in 2h, which the patient agrees to at this time  D/w Dr Lily Connors MD 6/17/2021 10:52 PM

## 2021-06-18 NOTE — PROGRESS NOTES
ObGyn Update:    Had /112 at 1823  Ordered labetalol 40mg IV  Patient declined this  Repeat /87 at 1832, 149/91 at 1842    --> Will hold labetalol re-dose for now, continue to follow BPs

## 2021-06-18 NOTE — OB LABOR/OXYTOCIN SAFETY PROGRESS
Oxytocin Safety Progress Check Note - Daysi Edy 27 y o  female MRN: [id number]    Unit/Bed#: -01 Encounter: [id number]    Dose (yasmany-units/min) Oxytocin: 8 yasmany-units/min  Contraction Frequency (minutes): 2-3  Contraction Quality: Moderate  Tachysystole: No   Cervical Dilation: 2        Cervical Effacement: 0  Fetal Station: Ballotable  Baseline Rate: 150 bpm  Fetal Heart Rate: 144 BPM  FHR Category: Category I               Vital Signs:   Vitals:    06/17/21 2104   BP: 134/85   Pulse: 86   Resp:    Temp:    SpO2:            Notes/comments: The patient has been restarted on pitocin s/p folley balloon removal  Will defer this check and recheck in 2h hours   D/w Dr Garcia Dumont MD 6/17/2021 9:07 PM

## 2021-06-18 NOTE — PROGRESS NOTES
Patient was tearful at times during the night and upset with her induction of labor  The patient stated that "she never wanted to be induced in the first place and was told that she had to because her fluid was low" which "scared her into an induction"  The patient declined having an epidural and said that the the Pitocin was "too much to handle"  She did receive Stadol for pain relief  She stated that "she would rather go home"  She refused any internal checks after the check that was preformed at 2335  She said that she will "let the staff know when she feels an urge to push"  She is very disappointed in the process and "wishes that she would have been told that it is a long process"  Patient said she wants the Pitocin off and she wishes to contract on her own  Aware that someone from her physician office will be in to talk to her  I answered all questions that I was able to, and let her, her mother, and her boyfriend vent their frustrations to me  Along with the patient, her mother and boyfriend are also frustrated with their experience, stating that "they are ready to take her home and have a home delivery  "

## 2021-06-18 NOTE — NURSING NOTE
Note late due to patient care  20 mg IV Labetalol given per order at 1811  Recheck BP at 1823 was 176/112- Dr Israel Culver made aware and ordered 40mg IV of labetalol  Patient states that it is "not a real blood pressure " Patient states significant other was "holding her hand" on the arm that the blood pressure was taken  Patient refused labetalol 40mg IV  Dr Israel Culver made aware  Repeat /87 at 1832

## 2021-06-18 NOTE — PROGRESS NOTES
I went to the room to discuss the plan of care with the patient and her family  The patient was admitted on  in the pm for an elective induction of labor at 36 4/7wks  She initially received two doses of cytotec (one oral and one vaginal) followed by a moon balloon and pitocin  The moon balloon was removed after 12 hours in place without significant cervical change  Pitocin has been used since (with some breaks for maternal relief) still without much cervical change  Talya expressed frustration with multiple things at this time including that she feels stressed by her interactions with Dr Amena Joe and does not wish to be under her care at this time  She felt that the pitocin made her feel "like a crackhead" so she would prefer not to have that medication if her body can make progress on its own  She does feel that she has continued to contract since the pitocin was turned off  She felt that her contractions were happening every 45 seconds on pitocin which concerned her  She, her partner and her mother all understand that the decelerations in the fetal heart rate were occurring but feel that it was due to maternal positioning and not an underlying problem  We all reviewed the current FHR tracing which is category 1 with a baseline of 155, moderate variability, +accels, -decels  We briefly discussed the concept of shared decision making as well as refusal of treatment  At this time, they are not refusing all treatment  She understands that it can take days to weeks for spontaneous labor to start even at 36 6/7wks and that I do not recommend waiting beyond 41 weeks given the increased risk of stillbirth at that time  She is willing to be monitored while she waits  Her partner acknowledged that if it becomes clear that a  is the safest thing for the baby, they would be willing to accept it    However, she is adament that she does not want a  at this time which I think is reasonable given that the FHR tracing has returned to category 1  She would like to wait for about two hours to see if she has made any progress toward labor on her own  If she has not made progress at that time, we will discuss restarting the pitocin  She would prefer to avoid cervical checks if possible given that they are painful for her  We discussed that we could restart pitocin without a repeat cervical check if she wishes as it is unlikely that she will have made change if she does not feel that her contractions are any stronger at that time  She does understand that she will need cervical checks to assess for cervical change along the way but that we can try to keep them to a minimum  All of their questions were answered, and we are in agreement about the plan of care at this time  I will return to reassess her progress at 11:30 unless she has questions/concerns sooner or unless the fetal heart rate tracing shows any concerning signs  The plan of care was also discussed with the bedside RN and the residents

## 2021-06-18 NOTE — PLAN OF CARE
Problem: Knowledge Deficit  Goal: Verbalizes understanding of labor plan  Description: Assess patient/family/caregiver's baseline knowledge level and ability to understand information  Provide education via patient/family/caregiver's preferred learning method at appropriate level of understanding  1  Provide teaching at level of understanding  2  Provide teaching via preferred learning method(s)  Outcome: Progressing  Goal: Patient/family/caregiver demonstrates understanding of disease process, treatment plan, medications, and discharge instructions  Description: Complete learning assessment and assess knowledge base  Interventions:  - Provide teaching at level of understanding  - Provide teaching via preferred learning methods  Outcome: Progressing     Problem: Labor & Delivery  Goal: Manages discomfort  Description: Assess and monitor for signs and symptoms of discomfort  Assess patient's pain level regularly and per hospital policy  Administer medications as ordered  Support use of nonpharmacological methods to help control pain such as distraction, imagery, relaxation, and application of heat and cold  Collaborate with interdisciplinary team and patient to determine appropriate pain management plan  1  Include patient in decisions related to comfort  2  Offer non-pharmacological pain management interventions  3  Report ineffective pain management to physician  Outcome: Progressing  Goal: Patient vital signs are stable  Description: 1  Assess vital signs - vaginal delivery    Outcome: Progressing     Problem: PAIN - ADULT  Goal: Verbalizes/displays adequate comfort level or baseline comfort level  Description: Interventions:  - Encourage patient to monitor pain and request assistance  - Assess pain using appropriate pain scale  - Administer analgesics based on type and severity of pain and evaluate response  - Implement non-pharmacological measures as appropriate and evaluate response  - Consider cultural and social influences on pain and pain management  - Notify physician/advanced practitioner if interventions unsuccessful or patient reports new pain  Outcome: Progressing     Problem: INFECTION - ADULT  Goal: Absence or prevention of progression during hospitalization  Description: INTERVENTIONS:  - Assess and monitor for signs and symptoms of infection  - Monitor lab/diagnostic results  - Monitor all insertion sites, i e  indwelling lines, tubes, and drains  - Monitor endotracheal if appropriate and nasal secretions for changes in amount and color  - [place] appropriate cooling/warming therapies per order  - Administer medications as ordered  - Instruct and encourage patient and family to use good hand hygiene technique  - Identify and instruct in appropriate isolation precautions for identified infection/condition  Outcome: Progressing  Goal: Absence of fever/infection during neutropenic period  Description: INTERVENTIONS:  - Monitor WBC    Outcome: Progressing          Problem: DISCHARGE PLANNING  Goal: Discharge to home or other facility with appropriate resources  Description: INTERVENTIONS:  - Identify barriers to discharge w/patient and caregiver  - Arrange for needed discharge resources and transportation as appropriate  - Identify discharge learning needs (meds, wound care, etc )  - Arrange for interpretive services to assist at discharge as needed  - Refer to Case Management Department for coordinating discharge planning if the patient needs post-hospital services based on physician/advanced practitioner order or complex needs related to functional status, cognitive ability, or social support system  Outcome: Progressing

## 2021-06-18 NOTE — OB LABOR/OXYTOCIN SAFETY PROGRESS
Oxytocin Safety Progress Check Note - Olinda Ferguson 27 y o  female MRN: [id number]    Unit/Bed#: -01 Encounter: [id number]    Dose (yasmany-units/min) Oxytocin: 0 yasmany-units/min (stopped per pt request  Dr Star Hamlin and Dr Jun Mathur aware)  Contraction Frequency (minutes): 3-4  Contraction Quality: Moderate  Tachysystole: No   Cervical Dilation: 2        Cervical Effacement: 30  Fetal Station: -3  Baseline Rate: 140 bpm  Fetal Heart Rate: 135 BPM  FHR Category: Category I               Vital Signs:   Vitals:    21 0632   BP: 132/75   Pulse: 101   Resp:    Temp:    SpO2:        Notes/comments:   I took over care of this pt at 0800  Induction course reviewed  S/p cytotec, moon, pitocin  She is now declining all intervention and has asked her nurse to turn off pitocin  On review of her tracing, fetus is noted to have intermittent prolonged decelerations  I entered pt's room at 0830 due to deceleration on monitor  Pt was repositioned - however had 2 more prolonged decelerations to the 60s  At this time we discussed the plan going forward  My recommendation is to move forward with induction or  section as her baby is showing signs of distress  We discussed that Cat 2 tracing is sometimes tolerable if labor is progressing and delivery anticipated in the near future  She was counseled that her baby could die if no intervention is taken  She continues to decline intervention  At this time, decision was made to call Dr Charla Weldon, along with Dr Star Hamlin, to discuss the plan going forward  The situation was explained  We were advised that our duty is to mom and not baby and intervention cannot be administered without patient's consent, despite risk to baby's life  If patient is aware of risks she can opt to leave against medical advice and be discharged home  If she would rather stay in the hospital, monitoring of fetus is not recommended if she continues to decline intervention      The patient then requested that I not participate in her care [facility]  Dr Willem Corral was made aware of the situation and planned to discuss further with the patient          Blair Palacios MD 6/18/2021 9:08 AM

## 2021-06-19 ENCOUNTER — ANESTHESIA EVENT (INPATIENT)
Dept: LABOR AND DELIVERY | Facility: HOSPITAL | Age: 26
DRG: 540 | End: 2021-06-19
Payer: COMMERCIAL

## 2021-06-19 PROBLEM — E66.813 CLASS 3 SEVERE OBESITY IN ADULT (HCC): Status: ACTIVE | Noted: 2021-06-19

## 2021-06-19 PROBLEM — O14.10 PREECLAMPSIA, SEVERE: Status: ACTIVE | Noted: 2021-06-19

## 2021-06-19 PROBLEM — Z98.891 S/P PRIMARY LOW TRANSVERSE C-SECTION: Status: ACTIVE | Noted: 2021-06-19

## 2021-06-19 PROBLEM — E66.01 CLASS 3 SEVERE OBESITY IN ADULT (HCC): Status: ACTIVE | Noted: 2021-06-19

## 2021-06-19 LAB
BASE EXCESS BLDCOA CALC-SCNC: -5.9 MMOL/L (ref 3–11)
BASE EXCESS BLDCOV CALC-SCNC: -5.7 MMOL/L (ref 1–9)
HCO3 BLDCOA-SCNC: 23 MMOL/L (ref 17.3–27.3)
HCO3 BLDCOV-SCNC: 22.3 MMOL/L (ref 12.2–28.6)
O2 CT VFR BLDCOA CALC: 9.6 ML/DL
OXYHGB MFR BLDCOA: 38.3 %
OXYHGB MFR BLDCOV: 80.8 %
PCO2 BLDCOA: 57.8 MM[HG] (ref 30–60)
PCO2 BLDCOV: 51.8 MM HG (ref 27–43)
PH BLDCOA: 7.22 [PH] (ref 7.23–7.43)
PH BLDCOV: 7.25 [PH] (ref 7.19–7.49)
PO2 BLDCOA: 21 MM HG (ref 5–25)
PO2 BLDCOV: 43.5 MM HG (ref 15–45)
SAO2 % BLDCOV: 21.1 ML/DL

## 2021-06-19 PROCEDURE — 59514 CESAREAN DELIVERY ONLY: CPT | Performed by: STUDENT IN AN ORGANIZED HEALTH CARE EDUCATION/TRAINING PROGRAM

## 2021-06-19 PROCEDURE — 88307 TISSUE EXAM BY PATHOLOGIST: CPT | Performed by: PATHOLOGY

## 2021-06-19 PROCEDURE — 94760 N-INVAS EAR/PLS OXIMETRY 1: CPT

## 2021-06-19 PROCEDURE — 94762 N-INVAS EAR/PLS OXIMTRY CONT: CPT

## 2021-06-19 PROCEDURE — 82805 BLOOD GASES W/O2 SATURATION: CPT | Performed by: STUDENT IN AN ORGANIZED HEALTH CARE EDUCATION/TRAINING PROGRAM

## 2021-06-19 PROCEDURE — 99024 POSTOP FOLLOW-UP VISIT: CPT | Performed by: OBSTETRICS & GYNECOLOGY

## 2021-06-19 PROCEDURE — 10907ZC DRAINAGE OF AMNIOTIC FLUID, THERAPEUTIC FROM PRODUCTS OF CONCEPTION, VIA NATURAL OR ARTIFICIAL OPENING: ICD-10-PCS | Performed by: STUDENT IN AN ORGANIZED HEALTH CARE EDUCATION/TRAINING PROGRAM

## 2021-06-19 RX ORDER — ONDANSETRON 2 MG/ML
INJECTION INTRAMUSCULAR; INTRAVENOUS AS NEEDED
Status: DISCONTINUED | OUTPATIENT
Start: 2021-06-19 | End: 2021-06-19

## 2021-06-19 RX ORDER — PROMETHAZINE HYDROCHLORIDE 25 MG/ML
12.5 INJECTION, SOLUTION INTRAMUSCULAR; INTRAVENOUS ONCE
Status: COMPLETED | OUTPATIENT
Start: 2021-06-19 | End: 2021-06-19

## 2021-06-19 RX ORDER — SUCCINYLCHOLINE/SOD CL,ISO/PF 100 MG/5ML
SYRINGE (ML) INTRAVENOUS AS NEEDED
Status: DISCONTINUED | OUTPATIENT
Start: 2021-06-19 | End: 2021-06-19

## 2021-06-19 RX ORDER — IBUPROFEN 600 MG/1
600 TABLET ORAL EVERY 6 HOURS PRN
Status: DISCONTINUED | OUTPATIENT
Start: 2021-06-19 | End: 2021-06-20

## 2021-06-19 RX ORDER — KETOROLAC TROMETHAMINE 30 MG/ML
30 INJECTION, SOLUTION INTRAMUSCULAR; INTRAVENOUS EVERY 6 HOURS
Status: DISCONTINUED | OUTPATIENT
Start: 2021-06-19 | End: 2021-06-20

## 2021-06-19 RX ORDER — ONDANSETRON 2 MG/ML
4 INJECTION INTRAMUSCULAR; INTRAVENOUS ONCE AS NEEDED
Status: DISCONTINUED | OUTPATIENT
Start: 2021-06-19 | End: 2021-06-22 | Stop reason: HOSPADM

## 2021-06-19 RX ORDER — NALBUPHINE HCL 10 MG/ML
5 AMPUL (ML) INJECTION
Status: DISCONTINUED | OUTPATIENT
Start: 2021-06-19 | End: 2021-06-22 | Stop reason: HOSPADM

## 2021-06-19 RX ORDER — SIMETHICONE 80 MG
80 TABLET,CHEWABLE ORAL 4 TIMES DAILY PRN
Status: DISCONTINUED | OUTPATIENT
Start: 2021-06-19 | End: 2021-06-22 | Stop reason: HOSPADM

## 2021-06-19 RX ORDER — FENTANYL CITRATE 50 UG/ML
INJECTION, SOLUTION INTRAMUSCULAR; INTRAVENOUS AS NEEDED
Status: DISCONTINUED | OUTPATIENT
Start: 2021-06-19 | End: 2021-06-19

## 2021-06-19 RX ORDER — SODIUM CHLORIDE, SODIUM LACTATE, POTASSIUM CHLORIDE, CALCIUM CHLORIDE 600; 310; 30; 20 MG/100ML; MG/100ML; MG/100ML; MG/100ML
INJECTION, SOLUTION INTRAVENOUS CONTINUOUS PRN
Status: DISCONTINUED | OUTPATIENT
Start: 2021-06-19 | End: 2021-06-19

## 2021-06-19 RX ORDER — KETOROLAC TROMETHAMINE 30 MG/ML
INJECTION, SOLUTION INTRAMUSCULAR; INTRAVENOUS AS NEEDED
Status: DISCONTINUED | OUTPATIENT
Start: 2021-06-19 | End: 2021-06-19

## 2021-06-19 RX ORDER — DIAPER,BRIEF,INFANT-TODD,DISP
1 EACH MISCELLANEOUS DAILY PRN
Status: DISCONTINUED | OUTPATIENT
Start: 2021-06-19 | End: 2021-06-22 | Stop reason: HOSPADM

## 2021-06-19 RX ORDER — OXYCODONE HYDROCHLORIDE 5 MG/1
5 TABLET ORAL EVERY 4 HOURS PRN
Status: DISCONTINUED | OUTPATIENT
Start: 2021-06-20 | End: 2021-06-22 | Stop reason: HOSPADM

## 2021-06-19 RX ORDER — CEFAZOLIN SODIUM 2 G/50ML
2000 SOLUTION INTRAVENOUS ONCE
Status: COMPLETED | OUTPATIENT
Start: 2021-06-19 | End: 2021-06-19

## 2021-06-19 RX ORDER — METOCLOPRAMIDE HYDROCHLORIDE 5 MG/ML
5 INJECTION INTRAMUSCULAR; INTRAVENOUS EVERY 6 HOURS PRN
Status: ACTIVE | OUTPATIENT
Start: 2021-06-19 | End: 2021-06-20

## 2021-06-19 RX ORDER — ONDANSETRON 2 MG/ML
4 INJECTION INTRAMUSCULAR; INTRAVENOUS EVERY 4 HOURS PRN
Status: ACTIVE | OUTPATIENT
Start: 2021-06-19 | End: 2021-06-20

## 2021-06-19 RX ORDER — DEXAMETHASONE SODIUM PHOSPHATE 4 MG/ML
8 INJECTION, SOLUTION INTRA-ARTICULAR; INTRALESIONAL; INTRAMUSCULAR; INTRAVENOUS; SOFT TISSUE ONCE AS NEEDED
Status: ACTIVE | OUTPATIENT
Start: 2021-06-19 | End: 2021-06-20

## 2021-06-19 RX ORDER — NALOXONE HYDROCHLORIDE 0.4 MG/ML
0.1 INJECTION, SOLUTION INTRAMUSCULAR; INTRAVENOUS; SUBCUTANEOUS
Status: ACTIVE | OUTPATIENT
Start: 2021-06-19 | End: 2021-06-20

## 2021-06-19 RX ORDER — ROPIVACAINE HYDROCHLORIDE 2 MG/ML
INJECTION, SOLUTION EPIDURAL; INFILTRATION; PERINEURAL AS NEEDED
Status: DISCONTINUED | OUTPATIENT
Start: 2021-06-19 | End: 2021-06-19

## 2021-06-19 RX ORDER — DIPHENHYDRAMINE HYDROCHLORIDE 50 MG/ML
25 INJECTION INTRAMUSCULAR; INTRAVENOUS EVERY 6 HOURS PRN
Status: DISCONTINUED | OUTPATIENT
Start: 2021-06-19 | End: 2021-06-19 | Stop reason: SDUPTHER

## 2021-06-19 RX ORDER — PROMETHAZINE HYDROCHLORIDE 25 MG/ML
12.5 INJECTION, SOLUTION INTRAMUSCULAR; INTRAVENOUS ONCE AS NEEDED
Status: DISCONTINUED | OUTPATIENT
Start: 2021-06-19 | End: 2021-06-22 | Stop reason: HOSPADM

## 2021-06-19 RX ORDER — MORPHINE SULFATE 0.5 MG/ML
INJECTION, SOLUTION EPIDURAL; INTRATHECAL; INTRAVENOUS AS NEEDED
Status: DISCONTINUED | OUTPATIENT
Start: 2021-06-19 | End: 2021-06-19

## 2021-06-19 RX ORDER — SODIUM CHLORIDE, SODIUM LACTATE, POTASSIUM CHLORIDE, CALCIUM CHLORIDE 600; 310; 30; 20 MG/100ML; MG/100ML; MG/100ML; MG/100ML
50 INJECTION, SOLUTION INTRAVENOUS CONTINUOUS
Status: DISCONTINUED | OUTPATIENT
Start: 2021-06-19 | End: 2021-06-22 | Stop reason: HOSPADM

## 2021-06-19 RX ORDER — HYDROXYZINE HYDROCHLORIDE 25 MG/1
25 TABLET, FILM COATED ORAL EVERY 6 HOURS PRN
Status: DISCONTINUED | OUTPATIENT
Start: 2021-06-19 | End: 2021-06-22 | Stop reason: HOSPADM

## 2021-06-19 RX ORDER — ACETAMINOPHEN 325 MG/1
650 TABLET ORAL EVERY 6 HOURS
Status: DISCONTINUED | OUTPATIENT
Start: 2021-06-19 | End: 2021-06-20

## 2021-06-19 RX ORDER — LIDOCAINE HYDROCHLORIDE AND EPINEPHRINE 15; 5 MG/ML; UG/ML
INJECTION, SOLUTION EPIDURAL
Status: COMPLETED | OUTPATIENT
Start: 2021-06-19 | End: 2021-06-19

## 2021-06-19 RX ORDER — FENTANYL CITRATE/PF 50 MCG/ML
25 SYRINGE (ML) INJECTION
Status: DISCONTINUED | OUTPATIENT
Start: 2021-06-19 | End: 2021-06-22 | Stop reason: HOSPADM

## 2021-06-19 RX ORDER — DOCUSATE SODIUM 100 MG/1
100 CAPSULE, LIQUID FILLED ORAL 2 TIMES DAILY
Status: DISCONTINUED | OUTPATIENT
Start: 2021-06-19 | End: 2021-06-22 | Stop reason: HOSPADM

## 2021-06-19 RX ORDER — OXYCODONE HYDROCHLORIDE 10 MG/1
10 TABLET ORAL EVERY 4 HOURS PRN
Status: DISCONTINUED | OUTPATIENT
Start: 2021-06-20 | End: 2021-06-22 | Stop reason: HOSPADM

## 2021-06-19 RX ORDER — PROPOFOL 10 MG/ML
INJECTION, EMULSION INTRAVENOUS AS NEEDED
Status: DISCONTINUED | OUTPATIENT
Start: 2021-06-19 | End: 2021-06-19

## 2021-06-19 RX ORDER — BUTORPHANOL TARTRATE 1 MG/ML
1 INJECTION, SOLUTION INTRAMUSCULAR; INTRAVENOUS ONCE
Status: COMPLETED | OUTPATIENT
Start: 2021-06-19 | End: 2021-06-19

## 2021-06-19 RX ORDER — CALCIUM CARBONATE 200(500)MG
1000 TABLET,CHEWABLE ORAL DAILY PRN
Status: DISCONTINUED | OUTPATIENT
Start: 2021-06-19 | End: 2021-06-22 | Stop reason: HOSPADM

## 2021-06-19 RX ORDER — OXYTOCIN/RINGER'S LACTATE 30/500 ML
62.5 PLASTIC BAG, INJECTION (ML) INTRAVENOUS CONTINUOUS
Status: ACTIVE | OUTPATIENT
Start: 2021-06-19 | End: 2021-06-20

## 2021-06-19 RX ORDER — DIPHENHYDRAMINE HCL 25 MG
25 TABLET ORAL EVERY 6 HOURS PRN
Status: DISCONTINUED | OUTPATIENT
Start: 2021-06-19 | End: 2021-06-22 | Stop reason: HOSPADM

## 2021-06-19 RX ORDER — HYDROMORPHONE HCL/PF 1 MG/ML
0.5 SYRINGE (ML) INJECTION EVERY 2 HOUR PRN
Status: DISCONTINUED | OUTPATIENT
Start: 2021-06-19 | End: 2021-06-22 | Stop reason: HOSPADM

## 2021-06-19 RX ORDER — LIDOCAINE HYDROCHLORIDE AND EPINEPHRINE 20; 5 MG/ML; UG/ML
INJECTION, SOLUTION EPIDURAL; INFILTRATION; INTRACAUDAL; PERINEURAL AS NEEDED
Status: DISCONTINUED | OUTPATIENT
Start: 2021-06-19 | End: 2021-06-19

## 2021-06-19 RX ORDER — HYDROMORPHONE HCL/PF 1 MG/ML
0.2 SYRINGE (ML) INJECTION
Status: DISCONTINUED | OUTPATIENT
Start: 2021-06-19 | End: 2021-06-22 | Stop reason: HOSPADM

## 2021-06-19 RX ADMIN — SODIUM CHLORIDE 2.5 MILLION UNITS: 9 INJECTION, SOLUTION INTRAVENOUS at 08:00

## 2021-06-19 RX ADMIN — MAGNESIUM SULFATE HEPTAHYDRATE 2 G/HR: 40 INJECTION, SOLUTION INTRAVENOUS at 06:05

## 2021-06-19 RX ADMIN — SODIUM CHLORIDE, SODIUM LACTATE, POTASSIUM CHLORIDE, AND CALCIUM CHLORIDE 50 ML/HR: .6; .31; .03; .02 INJECTION, SOLUTION INTRAVENOUS at 18:07

## 2021-06-19 RX ADMIN — LIDOCAINE HYDROCHLORIDE AND EPINEPHRINE 5 ML: 20; 5 INJECTION, SOLUTION EPIDURAL; INFILTRATION; INTRACAUDAL; PERINEURAL at 11:23

## 2021-06-19 RX ADMIN — ROPIVACAINE HYDROCHLORIDE 10 MG: 2 INJECTION, SOLUTION EPIDURAL; INFILTRATION at 06:40

## 2021-06-19 RX ADMIN — Medication 100 MG: at 11:31

## 2021-06-19 RX ADMIN — ROPIVACAINE HYDROCHLORIDE: 2 INJECTION, SOLUTION EPIDURAL; INFILTRATION at 06:52

## 2021-06-19 RX ADMIN — Medication 62.5 MILLI-UNITS/MIN: at 18:13

## 2021-06-19 RX ADMIN — FENTANYL CITRATE 50 MCG: 50 INJECTION, SOLUTION INTRAMUSCULAR; INTRAVENOUS at 11:10

## 2021-06-19 RX ADMIN — PHENYLEPHRINE HYDROCHLORIDE 100 MCG: 10 INJECTION INTRAVENOUS at 08:02

## 2021-06-19 RX ADMIN — SODIUM CHLORIDE 2.5 MILLION UNITS: 9 INJECTION, SOLUTION INTRAVENOUS at 02:34

## 2021-06-19 RX ADMIN — MAGNESIUM SULFATE HEPTAHYDRATE 2 G/HR: 40 INJECTION, SOLUTION INTRAVENOUS at 18:06

## 2021-06-19 RX ADMIN — PHENYLEPHRINE HYDROCHLORIDE 100 MCG: 10 INJECTION INTRAVENOUS at 07:42

## 2021-06-19 RX ADMIN — PHENYLEPHRINE HYDROCHLORIDE 100 MCG: 10 INJECTION INTRAVENOUS at 07:10

## 2021-06-19 RX ADMIN — KETOROLAC TROMETHAMINE 30 MG: 30 INJECTION, SOLUTION INTRAMUSCULAR at 12:31

## 2021-06-19 RX ADMIN — PROMETHAZINE HYDROCHLORIDE 12.5 MG: 25 INJECTION INTRAMUSCULAR; INTRAVENOUS at 02:33

## 2021-06-19 RX ADMIN — ACETAMINOPHEN 650 MG: 325 TABLET, FILM COATED ORAL at 14:04

## 2021-06-19 RX ADMIN — CEFAZOLIN SODIUM 2000 MG: 2 SOLUTION INTRAVENOUS at 11:10

## 2021-06-19 RX ADMIN — NALBUPHINE HYDROCHLORIDE 5 MG: 10 INJECTION, SOLUTION INTRAMUSCULAR; INTRAVENOUS; SUBCUTANEOUS at 15:24

## 2021-06-19 RX ADMIN — PROPOFOL 200 MG: 10 INJECTION, EMULSION INTRAVENOUS at 11:31

## 2021-06-19 RX ADMIN — DOCUSATE SODIUM 100 MG: 100 CAPSULE, LIQUID FILLED ORAL at 18:07

## 2021-06-19 RX ADMIN — SODIUM CHLORIDE, SODIUM LACTATE, POTASSIUM CHLORIDE, AND CALCIUM CHLORIDE: .6; .31; .03; .02 INJECTION, SOLUTION INTRAVENOUS at 11:29

## 2021-06-19 RX ADMIN — LIDOCAINE HYDROCHLORIDE AND EPINEPHRINE 3 ML: 15; 5 INJECTION, SOLUTION EPIDURAL at 06:37

## 2021-06-19 RX ADMIN — KETOROLAC TROMETHAMINE 30 MG: 30 INJECTION, SOLUTION INTRAMUSCULAR at 18:03

## 2021-06-19 RX ADMIN — PHENYLEPHRINE HYDROCHLORIDE 100 MCG: 10 INJECTION INTRAVENOUS at 11:37

## 2021-06-19 RX ADMIN — ROPIVACAINE HYDROCHLORIDE: 2 INJECTION, SOLUTION EPIDURAL; INFILTRATION at 08:25

## 2021-06-19 RX ADMIN — SODIUM CHLORIDE, SODIUM LACTATE, POTASSIUM CHLORIDE, AND CALCIUM CHLORIDE: .6; .31; .03; .02 INJECTION, SOLUTION INTRAVENOUS at 12:07

## 2021-06-19 RX ADMIN — MORPHINE SULFATE 3 MG: 0.5 INJECTION, SOLUTION EPIDURAL; INTRATHECAL; INTRAVENOUS at 12:06

## 2021-06-19 RX ADMIN — PHENYLEPHRINE HYDROCHLORIDE 100 MCG: 10 INJECTION INTRAVENOUS at 11:55

## 2021-06-19 RX ADMIN — FENTANYL CITRATE 25 MCG: 50 INJECTION INTRAMUSCULAR; INTRAVENOUS at 13:39

## 2021-06-19 RX ADMIN — FENTANYL CITRATE 25 MCG: 50 INJECTION INTRAMUSCULAR; INTRAVENOUS at 14:50

## 2021-06-19 RX ADMIN — AZITHROMYCIN 500 MG: 500 INJECTION, POWDER, LYOPHILIZED, FOR SOLUTION INTRAVENOUS at 11:15

## 2021-06-19 RX ADMIN — LIDOCAINE HYDROCHLORIDE AND EPINEPHRINE 5 ML: 20; 5 INJECTION, SOLUTION EPIDURAL; INFILTRATION; INTRACAUDAL; PERINEURAL at 11:01

## 2021-06-19 RX ADMIN — LIDOCAINE HYDROCHLORIDE AND EPINEPHRINE 5 ML: 20; 5 INJECTION, SOLUTION EPIDURAL; INFILTRATION; INTRACAUDAL; PERINEURAL at 11:07

## 2021-06-19 RX ADMIN — PHENYLEPHRINE HYDROCHLORIDE 100 MCG: 10 INJECTION INTRAVENOUS at 11:46

## 2021-06-19 RX ADMIN — BUTORPHANOL TARTRATE 1 MG: 1 INJECTION, SOLUTION INTRAMUSCULAR; INTRAVENOUS at 02:33

## 2021-06-19 RX ADMIN — ONDANSETRON 4 MG: 2 INJECTION INTRAMUSCULAR; INTRAVENOUS at 11:09

## 2021-06-19 RX ADMIN — ACETAMINOPHEN 650 MG: 325 TABLET, FILM COATED ORAL at 20:10

## 2021-06-19 RX ADMIN — LIDOCAINE HYDROCHLORIDE AND EPINEPHRINE 5 ML: 20; 5 INJECTION, SOLUTION EPIDURAL; INFILTRATION; INTRACAUDAL; PERINEURAL at 11:11

## 2021-06-19 NOTE — PLAN OF CARE
Problem: Knowledge Deficit  Goal: Verbalizes understanding of labor plan  Description: Assess patient/family/caregiver's baseline knowledge level and ability to understand information  Provide education via patient/family/caregiver's preferred learning method at appropriate level of understanding  1  Provide teaching at level of understanding  2  Provide teaching via preferred learning method(s)  Outcome: Progressing  Goal: Patient/family/caregiver demonstrates understanding of disease process, treatment plan, medications, and discharge instructions  Description: Complete learning assessment and assess knowledge base  Interventions:  - Provide teaching at level of understanding  - Provide teaching via preferred learning methods  Outcome: Progressing     Problem: Labor & Delivery  Goal: Manages discomfort  Description: Assess and monitor for signs and symptoms of discomfort  Assess patient's pain level regularly and per hospital policy  Administer medications as ordered  Support use of nonpharmacological methods to help control pain such as distraction, imagery, relaxation, and application of heat and cold  Collaborate with interdisciplinary team and patient to determine appropriate pain management plan  1  Include patient in decisions related to comfort  2  Offer non-pharmacological pain management interventions  3  Report ineffective pain management to physician  Outcome: Progressing  Goal: Patient vital signs are stable  Description: 1  Assess vital signs - vaginal delivery    Outcome: Progressing     Problem: PAIN - ADULT  Goal: Verbalizes/displays adequate comfort level or baseline comfort level  Description: Interventions:  - Encourage patient to monitor pain and request assistance  - Assess pain using appropriate pain scale  - Administer analgesics based on type and severity of pain and evaluate response  - Implement non-pharmacological measures as appropriate and evaluate response  - Consider cultural and social influences on pain and pain management  - Notify physician/advanced practitioner if interventions unsuccessful or patient reports new pain  Outcome: Progressing     Problem: INFECTION - ADULT  Goal: Absence or prevention of progression during hospitalization  Description: INTERVENTIONS:  - Assess and monitor for signs and symptoms of infection  - Monitor lab/diagnostic results  - Monitor all insertion sites, i e  indwelling lines, tubes, and drains  - Monitor endotracheal if appropriate and nasal secretions for changes in amount and color  - [place] appropriate cooling/warming therapies per order  - Administer medications as ordered  - Instruct and encourage patient and family to use good hand hygiene technique  - Identify and instruct in appropriate isolation precautions for identified infection/condition  Outcome: Progressing  Goal: Absence of fever/infection during neutropenic period  Description: INTERVENTIONS:  - Monitor WBC    Outcome: Progressing     Problem: SAFETY ADULT  Goal: Patient will remain free of falls  Description: INTERVENTIONS:  - Educate patient/family on patient safety including physical limitations  - Instruct patient to call for assistance with activity   - Consult OT/PT to assist with strengthening/mobility   - Keep Call bell within reach  - Keep bed low and locked with side rails adjusted as appropriate  - Keep care items and personal belongings within reach  - Initiate and maintain comfort rounds  - Make Fall Risk Sign visible to staff  - Obtain necessary fall risk management equipment:   - Apply yellow socks and bracelet for high fall risk patients  - Consider moving patient to room near nurses station  Outcome: Progressing  Goal: Maintain or return to baseline ADL function  Description: INTERVENTIONS:  -  Assess patient's ability to carry out ADLs; assess patient's baseline for ADL function and identify physical deficits which impact ability to perform ADLs (bathing, care of mouth/teeth, toileting, grooming, dressing, etc )  - Assess/evaluate cause of self-care deficits   - Assess range of motion  - Assess patient's mobility; develop plan if impaired  - Assess patient's need for assistive devices and provide as appropriate  - Encourage maximum independence but intervene and supervise when necessary  - Involve family in performance of ADLs  - Assess for home care needs following discharge   - Consider OT consult to assist with ADL evaluation and planning for discharge  - Provide patient education as appropriate  Outcome: Progressing  Goal: Maintains/Returns to pre admission functional level  Description: INTERVENTIONS:  - Perform BMAT or MOVE assessment daily    - Set and communicate daily mobility goal to care team and patient/family/caregiver  - Collaborate with rehabilitation services on mobility goals if consulted  - Perform Range of Motion prn times a day    - Reposition patient every 30 min   -  - Out of bed for toileting  - Record patient progress and toleration of activity level   Outcome: Progressing     Problem: DISCHARGE PLANNING  Goal: Discharge to home or other facility with appropriate resources  Description: INTERVENTIONS:  - Identify barriers to discharge w/patient and caregiver  - Arrange for needed discharge resources and transportation as appropriate  - Identify discharge learning needs (meds, wound care, etc )  - Arrange for interpretive services to assist at discharge as needed  - Refer to Case Management Department for coordinating discharge planning if the patient needs post-hospital services based on physician/advanced practitioner order or complex needs related to functional status, cognitive ability, or social support system  Outcome: Progressing

## 2021-06-19 NOTE — OP NOTE
OPERATIVE REPORT  PATIENT NAME: Sanjuana Nicolas    :  1995  MRN: [id number]  Pt Location: [facility] L&D OR ROOM 02    SURGERY DATE: 2021    Indications:   Fetal intolerance of labor  Persistent Category II FHT, inability to augment labor, remote from delivery  Pre-operative Diagnosis:   41w0d pregnancy  GBS positive, adequately treated  Preeclampsia with severe features  Obesity    Post-operative Diagnosis:   1LTCS     Attending: Tae Simon MD  Resident: Redd Cordova MD    Maternal Findings:  Normal uterus  Normal tubes and ovaries bilaterally  No adhesions  No difficulty noted from skin to delivery     Findings:  Viable male weighing 6lbs 13oz; Apgar scores of 1 at one minute, 4 at five minutes and 8 at 10 minutes  Nuchal cord x1, reduced without difficulty  Clear amniotic fluid  Normal placenta with 3-vessel cord  Terminal meconium     Arterial and Venous Gases:  Umbilical Cord Venous Blood Gas:  Results from last 7 days   Lab Units 21  1135   PH COV  7 251   PCO2 COV mm HG 51 8*   HCO3 COV mmol/L 22 3   BASE EXC COV mmol/L -5 7*   O2 CT CD VB mL/dL 21 1   O2 HGB, VENOUS CORD % 75 0     Umbilical Cord Arterial Blood Gas:  Results from last 7 days   Lab Units 21  1135   PH COA  7 217*   PCO2 COA  57 8   PO2 COA mm HG 21 0   HCO3 COA mmol/L 23 0   BASE EXC COA mmol/L -5 9*   O2 CONTENT CORD ART ml/dl 9 6   O2 HGB, ARTERIAL CORD % 38 3       Specimens: Arterial and venous cord gases, cord blood, segment of umbilical cord, placenta to pathology    Quantitative Blood Loss: pending    Drains: Ceja catheter           Complications:  None; patient tolerated the procedure well  Disposition: PACU            Condition: stable    Procedure Details   The patient was seen prior to the procedure  Risks, benefits, possible complications, alternate treatment options, and expected outcomes were discussed with the patient    The patient agreed with the proposed plan and gave informed consent for a primary low transverse  section for indication of fetal intolerance of labor  The patient was taken to the [facility] Operating Room at  where she received a rebolus of her epidural  For infection prophylaxis, she received 2g ancef and 500 mg azithromycin preoperatively  Fetal heart tones in the OR were assessed and noted to be within normal limits and a Ceja catheter and SCDs were placed  The abdomen was prepped with Chloraprep, the vagina was prepped with Betadine, and following appropriate drying time, the patient was draped in the usual sterile manner  A Time Out was held and the above information confirmed  The patient was identified as Blu Ayon and the procedure verified as a  Delivery for fetal intolerance of labor  Following time out, patient continued to feel sharp pain in the setting of epidural rebolus and decision was made to proceed under general anesthesia with intubation in stat fashion at 1132  A Pfannenstiel incision was made and carried down through the underlying subcutaneous tissue to the fascia using a scalpel  Rectus fascia was then incised  We then proceeded in Ben-Neff fashion  All anatomic layers were well-demarcated  The rectus muscles were  and the peritoneum was identified, entered, and extended longitudinally with blunt dissection  The bladder blade was inserted  A low transverse uterine incision was made with the scalpel and extended cephalocaudad with blunt dissection  The amnion was entered bluntly  The fetal head was palpated, elevated, and delivered through the uterine incision followed by the body without difficulty  A loose nuchal cord was identified and easily reduced  Time of birth was noted at [phone]  There was no apparent injury to the   The umbilical cord was doubly clamped and cut immediately and the infant was handed off to the  providers    Arterial and venous cord gases, cord blood, and a segment of umbilical cord were obtained for evaluation  The placenta delivered spontaneously at 66 65 76 with uterine fundal massage and appeared normal  The uterus was exteriorized and cleaned out with a moist lap sponge  The uterine incision was closed with a running locked suture of 0 Vicryl  A right lateral extension was identified and was repaired with 0-Monocryl  0-Monocryl was used to imbricate the uterine incision  Hemostasis was noted to be excellent  The posterior cul-de-sac was inspected and removed of clots and debris  The uterus was returned to the abdomen  The paracolic gutters were inspected and cleared of all clots and debris with moist lap sponges  The uterine incision was re-inspected and noted to be hemostatic  The rectus muscles and fascia were inspected and noted to be hemostatic  No buttonholes in the fasica were noted  The fascia was closed with a running suture of 0 PDS  The subcutaneous adipose tissue was then irrigated  Subcutaneous adipose tissue was closed with a running suture of 2-0 vicryl  The skin was closed with a subcuticular running suture of 4-0 Monocryl  Benzoin and steri-strip dressings were applied following a pressure dressing with abdominal binder  The patient appeared to tolerate the procedure very well  Lap sponge, needle, and instrument counts were correct x2  The patient's fundus was palpated and the uterus was expressed  She was extubated in the operating room without difficulty  She was then cleaned and transferred to her postpartum recovery room in stable condition and her infant went to the NICU  Attending Attestation: Dr Milla Gray MD was present for the entire procedure      Gayle Hanson MD  OB/GYN PGY-1  12:13 PM  06/19/21

## 2021-06-19 NOTE — PROGRESS NOTES
ObGyn Attending update (607):    S/Comfortable with epidural  SVE 2-3/50/high  FHRT 145 moderate (+)accels, no decels  [place] irregular, inconsistent  19yo  at 41 0 weeks gestational age with preeclampsia with severe features  (1) Preeclampsia with severe features     --> Continue magnesium sulfate  --> Continue to move toward delivery      (2) Pain control  Just got epidural and now comfortable  --> Continue epidural     (3) Induction  Arrived evening of 21  Started with misoprostol and moon ( at 0630)  Moon removed  at 1830  Then started oxytocin  Has been on and off oxytocin due to patient desires  Now consistently on oxytocin  Cervix still only 2-3cm dilated  --> Will continue induction this morning; may need to abandon if no progress  (2) Fetal status category I   --> Continuous fetal monitoring      Yony Borrego MD

## 2021-06-19 NOTE — PROGRESS NOTES
Update:    Uncomfortable       moderate (+)accels    SVE: 2/50/high    --> Stadol/phenergan x 1 dose

## 2021-06-19 NOTE — ANESTHESIA POSTPROCEDURE EVALUATION
Post-Op Assessment Note    CV Status:  Stable  Pain Score: 0    Pain management: adequate     Mental Status:  Alert   Hydration Status:  Euvolemic   PONV Controlled:  None      Post Op Vitals Reviewed: Yes      Staff: Anesthesiologist, CRNA   Comments: vss    Post-op block assessment: sterile dressing applied      No complications documented      BP      Temp      Pulse     Resp      SpO2

## 2021-06-19 NOTE — OB LABOR/OXYTOCIN SAFETY PROGRESS
Labor Progress Note - Marti Delatorre 27 y o  female MRN: [id number]    Unit/Bed#: LD  Encounter: [id number]    Dose (yasmany-units/min) Oxytocin: 0 yasmany-units/min (Per Dr Mayela Ayala)  Contraction Frequency (minutes): 3 5-4  Contraction Quality: Moderate  Tachysystole: No   Cervical Dilation: 2-3        Cervical Effacement: 50  Fetal Station: Ballotable  Baseline Rate: 150 bpm  Fetal Heart Rate: 166 BPM  FHR Category: Category II             Vital Signs:   Vitals:    21 0839   BP: 100/52   Pulse: 99   Resp:    Temp:    SpO2:      Notes/comments:     Took over care of Talya 08:00 on 2021  Received epidural this AM approximately 6:30  Afterwards noted to have late decelerations, recovered with repositioning and O2  Subsequently recovered to category 1, 150 bpm moderate variability no decels  At 09:00 noted to have spontaneous deceleration zac to 50-60  At bedside with Rocio Fan, PGY2  Repositioned, O2 placed, fluid bolus administered, recovered to 120 bpm after 5 minutes  AROM for clear fluid, FSE placed after brief discussion with Talya and her mother, in agreement  Talya with high level of discomfort despite epidural with check  Discussed with Adiel Edwards, her mother, and her significant other regarding course of the morning  Oxytocin off  Reviewed baby boy heart rate dropping, signs of distress, now resolved  Remains 2-3 cm dilated  Reviewed if fetal heart rate remains reassuring over next 30 minutes can restart oxytocin  However if baby boy shows signs of distress, and we need to turn of oxytocin again, would be an indication that vaginal delivery may not be feasible given baby's distress  Confirmed would be indication for  section  Questions answered to stated satisfaction of all members  Will allow to recover and regroup, restart oxytocin if remains cat 1 at 10 AM   Will plan to start at 1mu/min      Discussed with Anesthesia regarding high level of anxiety, family previous request for general anesthesia  Anesthesia discussing possibility of sedation rather than intubation, family considering  Preeclampsia with severe features - no additional severe range pressures, s/p labetalol 20 mg IV x1  Continue magnesium for seizure prophylaxis      Primitivo Cohn MD 6/19/2021 9:26 AM

## 2021-06-19 NOTE — QUICK NOTE
Spoke with Talya's mother and significant other directly after surgery  Reviewed findings at time of delivery including nuchal cord and terminal meconium  Reviewed APGARS low initially (1, 4, and 8 at 1, 5, and 10 minutes respectively)  Per family baby boy Emy Vargas doing well in NICU, now extubated  Reviewed surgery uncomplicated, no excessive blood loss  Talya underwent general anesthesia as was feeling pain despite epidural   Now extubated  Suture to be reabsorbed rather than staples  Plan to keep an eye on pain control postoperatively  Questions answered to family's satisfaction at this time

## 2021-06-19 NOTE — QUICK NOTE
Pt still under primary care of Dr Jerrell Oneill  Discussed pt's care with RN at 2300 while Dr Jerrell Oneill still in operating room  Oxytocin had been turned off due to prolonged decel  Baby recovered and OK to restart oxytocin at half  Dr Jerrell Oneill made aware and further decision making will be deferred to him

## 2021-06-19 NOTE — DISCHARGE SUMMARY
Discharge Summary - Karina Oneill 27 y o  female MRN: [id number]    Unit/Bed#: -01 Encounter: [id number]    Admission Date: 2021     Discharge Date: 21      Delivering Attending: Dr Lacie Moise  Discharge Attending: Dr Daron Cervantes    Procedures: Primary low transverse  section     Labor Course:  Karina Oneill is a 27 y o  Sofya Inks with an BILL of Estimated Date of Delivery: 21 at 41w0d who was admitted for a post-term induction of labor  She was closed/thick/high on admission to labor & delivery  She was started on PCN for GBS prophylaxis  She received a cytotec, and had several decelerations afterwards, with resolution by repositioning  A moon balloon was placed  She continued to have decelerations with the moon in place without any cytotec or pitocin  She declined SVEs  Pitocin was turned off  Moon balloon was removed by physician as it was not spontaneously expelled after 12 hours  Membranes were swept, and after 1 hour of a Cat 1 tracing, pitocin was restarted on night #2 of her induction  She declined cervical exams overnight, and requested a pit break  She became frustrated, and desired discharge home to await spontaneous labor at 42 weeks  We reviewed that she is not a candidate for safe medical discharge given that she is 40w6d with spontaneous decelerations while on pitocin and while off pitocin  Patient has had a largely category 1 strip overnight but given her decelerations previously, it would not be in her baby's best interest  She states that baby is only unhappy because she is being induced and it is not natural  She states that she did not want "any of this"  We reviewed that we can continue pitocin but that it will continue to be painful without an epidural  She declines pitocin and is requesting that pitocin be turned off  Reviewed that she may have made changes overnight but it is impossible to tell without examining her  She declines   She was offered a  section which she also declined  Patient was given time to review her options, while resident physician contacted a second Ada Nelson attending as well as  of department to discuss ethical/legal implications of leaving AMA in the setting of multiple decelerations at 40w6d  She was counseled that that it can take days to weeks for spontaneous labor to start even at 36 6/7wks and that I do not recommend waiting beyond 41 weeks given the increased risk of stillbirth at that time  She is willing to be monitored while she waits  Her partner acknowledged that if it becomes clear that a  is the safest thing for the baby, they would be willing to accept it  She then developed PreE with SF and was started on magnesium  She received labetalol 20 IV  It was recommended that she received 40 of IV labetalol for a second SRBP, but she declined  Pitocin was stopped as she had a prolonged deceleration  She received an epidural  She had a second  spontaneous deceleration zac to 50-60 and was repositioned, O2 placed, fluid bolus administered, recovered to 120 bpm after 5 minutes  AROM for clear fluid, FSE placed after brief discussion with Talya and her mother, in agreement  FHR then showed minimal variability, and after a long discussion with attending physician and resident physician, all were in agreement to plan to proceed with primary  section for indication of persistent category 2 tracing, inability to augment, remote from delivery, failed induction of labor   Delivery  She underwent a   delivery  She delivered a viable male  at 46 on 21  Weight was 6lbs 13oz with APGARs of 1, 4, and 8 at 1, 5, and 10 minutes  Her preoperative Hb was 11 3  Her postoperative Hb was 8 9, declined venofer  Her postoperative course was uncomplicated  Condition at discharge: good     On day of discharge pain was well controlled, patient was tolerating PO, passing flatus   She was discharged with standard post partum/ post operative instructions to follow up with her physician in 1 week for an incision check and in 3-6 weeks for a postpartum appointment  Discharge instructions/Information to patient and family:   -Do not place anything (no partner, tampons or douche) in your vagina for 6 weeks  -You may walk for exercise for the first 6 weeks then gradually return to your usual activities    -Please do not drive for 1 week if you have no stitches and for 2 weeks if you have stitches or underwent a  delivery     -You may take baths or shower per your preference    -Please look at your bust (breasts) in the mirror daily and call for redness or tenderness or increased warmth    -Please call us for temperature > 100 4*F or 38* C, worsening pain or a foul discharge  Discharge Medications:   Prenatal vitamin daily for 6 months or the duration of nursing whichever is longer  Motrin 600 mg orally every 6 hours as needed for pain  Tylenol (over the counter) per bottle directions as needed for pain: do NOT use with percocet  Hydrocortisone cream 1% (over the counter) applied 1-2x daily to hemorrhoids as needed  Percocet as needed    Provisions for Follow-Up Care: Follow up with your doctor in 1 week for incision site check and blood pressure check    Planned Readmission: no, however high risk for postpartum readmission for blood pressure management given refusal of treatment of severe range blood pressures during labor course    Toi Rueda 89, DO  Obstetrics & Gynecology PGY-1  2021  9:47 AM

## 2021-06-19 NOTE — ANESTHESIA PROCEDURE NOTES
Epidural Block    Patient location during procedure: OB  Start time: 6/19/2021 6:37 AM  Reason for block: procedure for pain and at surgeon's request  Staffing  Performed: anesthesiologist   Anesthesiologist: Tam Choe MD  Resident/CRNA: Leon Diop CRNA  Preanesthetic Checklist  Completed: patient identified, IV checked, site marked, risks and benefits discussed, surgical consent, monitors and equipment checked, pre-op evaluation and timeout performed  Epidural  Patient position: sitting  Prep: ChloraPrep and site prepped and draped  Patient monitoring: cardiac monitor, continuous pulse ox and frequent blood pressure checks  Approach: midline  Injection technique: ULISSES saline  Needle  Needle type: Tuohy   Needle gauge: 18 G  Catheter type: side hole  Catheter size: 20 G  Catheter at skin depth: 13 cm  Catheter securement method: stabilization device  Test dose: negativelidocaine 1 5% with epinephrine 1:200,000 test dose, 3 mL  Assessment  Number of attempts: 3 or morenegative aspiration for CSF, negative aspiration for heme and no paresthesia on injection  patient tolerated the procedure well with no immediate complications

## 2021-06-19 NOTE — OB LABOR/OXYTOCIN SAFETY PROGRESS
Labor Progress Note - Librado Thomas 27 y o  female MRN: [id number]    Unit/Bed#: -01 Encounter: [id number]    Dose (yasmany-units/min) Oxytocin: 0 yasmany-units/min (Per Dr Sonia Junior)  Contraction Frequency (minutes): irritability  Contraction Quality: Not applicable  Tachysystole: No   Cervical Dilation: 2-3        Cervical Effacement: 50  Fetal Station: Ballotable  Baseline Rate: 145 bpm  Fetal Heart Rate: 142 BPM  FHR Category: Category II     Provider Notified:  (1 ctx noted in 15 min)         Vital Signs:   Vitals:    21 1030   BP: 129/69   Pulse: 93   Resp:    Temp:    SpO2:            Notes/comments:     Called into room by [employer] Jess and her significant other regarding plan moving forward  They are amenable to  section at this time and avoiding restarting oxytocin  They would like to discuss with Ksenia mother before making final decision  FHR now with minimal variability  Cannot restart oxytocin at this time regardless  Reasonable plan to proceed with  section rather than attempt to continue inducing labor  Returned to room, Claytons mother in agreement  Plan to proceed with primary  section for indication of persistent category 2 tracing, inability to augment, remote from delivery, failed induction of labor      Nela Augustine MD 2021 10:44 AM

## 2021-06-19 NOTE — ANESTHESIA PREPROCEDURE EVALUATION
Procedure:  LABOR ANALGESIA    Relevant Problems   ANESTHESIA (within normal limits)      CARDIO (within normal limits)      GYN   (+) Encounter for supervision of normal first pregnancy in third trimester      PULMONARY (within normal limits)      Other   (+) Class 3 severe obesity in adult (HCC)   (+) Obesity affecting pregnancy in third trimester        Physical Exam    Airway    Mallampati score: II  TM Distance: >3 FB  Neck ROM: full     Dental   No notable dental hx     Cardiovascular  Cardiovascular exam normal    Pulmonary  Pulmonary exam normal     Other Findings        Anesthesia Plan  ASA Score- 3     Anesthesia Type- epidural with ASA Monitors  Additional Monitors:   Airway Plan:           Plan Factors-Exercise tolerance (METS): >4 METS  Chart reviewed  Existing labs reviewed  Patient summary reviewed  Patient is not a current smoker  Patient not instructed to abstain from smoking on day of procedure  Patient did not smoke on day of surgery  Induction- intravenous and rapid sequence induction  Postoperative Plan- Plan for postoperative opioid use  Informed Consent- Anesthetic plan and risks discussed with patient  I personally reviewed this patient with the CRNA  Discussed and agreed on the Anesthesia Plan with the CRNA  Freya Orta

## 2021-06-19 NOTE — DISCHARGE INSTRUCTIONS
Section   WHAT YOU SHOULD KNOW:   A  delivery, or , is abdominal surgery to deliver your baby  There are many reasons you may need a   · A  may be scheduled before labor if you had a  with your last baby  It may be scheduled if your baby is not positioned normally, or you are pregnant with more than 1 baby  · Your caregiver may perform an emergency  during labor to prevent life-threatening complications for you or your baby  A  may be done if your cervix does not dilate after several hours of active labor  · Other reasons for a  include maternal infections and problems with the placenta  AFTER YOU LEAVE:   Medicines:   · Prescription pain medicine  may be given  Ask how to take this medicine safely  · Acetaminophen  decreases pain and fever  It is available without a doctor's order  Ask how much to take and how often to take it  Follow directions  Acetaminophen can cause liver damage if not taken correctly  · NSAIDs  help decrease swelling and pain or fever  This medicine is available with or without a doctor's order  NSAIDs can cause stomach bleeding or kidney problems in certain people  If you take blood thinner medicine, always ask your obstetrician if NSAIDs are safe for you  Always read the medicine label and follow directions  · Take your medicine as directed  Contact your obstetrician (OB) if you think your medicine is not helping or if you have side effects  Tell him if you are allergic to any medicine  Keep a list of the medicines, vitamins, and herbs you take  Include the amounts, and when and why you take them  Bring the list or the pill bottles to follow-up visits  Carry your medicine list with you in case of an emergency  Follow up with your OB as directed: You may need to return to have your stitches or staples removed   Write down your questions so you remember to ask them during your visits  Wound care:  Carefully wash your wound with soap and water every day  Keep your wound clean and dry  Wear loose, comfortable clothes that do not rub against your wound  Ask your OB about bathing and showering  Drink plenty of liquids: You can lower your risk for a blood clot if you drink plenty of liquids  Ask how much liquid to drink each day and which liquids are best for you  Limit activity until you have fully recovered from surgery:   · Ask when it is safe for you to drive, walk up stairs, lift heavy objects, and have sex  · Ask when it is okay to exercise, and what types of exercise to do  Start slowly and do more as you get stronger  Contact your OB if:   · You have heavy vaginal bleeding that fills 1 or more sanitary pads in 1 hour  · You have a fever  · Your incision is swollen, red, or draining pus  · You have questions or concerns about yourself or your baby  Seek care immediately or call 911 if:   · Blood soaks through your bandage  · Your stitches come apart  · You feel lightheaded, short of breath, and have chest pain  · You cough up blood  · Your arm or leg feels warm, tender, and painful  It may look swollen and red  © 2014 [address] is for End User's use only and may not be sold, redistributed or otherwise used for commercial purposes  All illustrations and images included in CareNotes® are the copyrighted property of A D [employer] M [employer] Inc  or [employer]  The above information is an  only  It is not intended as medical advice for individual conditions or treatments  Talk to your doctor, nurse or pharmacist before following any medical regimen to see if it is safe and effective for you

## 2021-06-20 LAB
ALBUMIN SERPL BCP-MCNC: 2 G/DL (ref 3.5–5)
ALP SERPL-CCNC: 115 U/L (ref 46–116)
ALT SERPL W P-5'-P-CCNC: 11 U/L (ref 12–78)
ANION GAP SERPL CALCULATED.3IONS-SCNC: 6 MMOL/L (ref 4–13)
AST SERPL W P-5'-P-CCNC: 16 U/L (ref 5–45)
BILIRUB SERPL-MCNC: 0.19 MG/DL (ref 0.2–1)
BUN SERPL-MCNC: 10 MG/DL (ref 5–25)
CALCIUM ALBUM COR SERPL-MCNC: 9.1 MG/DL (ref 8.3–10.1)
CALCIUM SERPL-MCNC: 7.5 MG/DL (ref 8.3–10.1)
CHLORIDE SERPL-SCNC: 106 MMOL/L (ref 100–108)
CO2 SERPL-SCNC: 24 MMOL/L (ref 21–32)
CREAT SERPL-MCNC: 1.04 MG/DL (ref 0.6–1.3)
ERYTHROCYTE [DISTWIDTH] IN BLOOD BY AUTOMATED COUNT: 15.3 % (ref 11.6–15.1)
GFR SERPL CREATININE-BSD FRML MDRD: 87 ML/MIN/1.73SQ M
GLUCOSE SERPL-MCNC: 125 MG/DL (ref 65–140)
HCT VFR BLD AUTO: 28.5 % (ref 34.8–46.1)
HGB BLD-MCNC: 8.9 G/DL (ref 11.5–15.4)
MCH RBC QN AUTO: 26.6 PG (ref 26.8–34.3)
MCHC RBC AUTO-ENTMCNC: 31.2 G/DL (ref 31.4–37.4)
MCV RBC AUTO: 85 FL (ref 82–98)
PLATELET # BLD AUTO: 222 THOUSANDS/UL (ref 149–390)
PMV BLD AUTO: 11 FL (ref 8.9–12.7)
POTASSIUM SERPL-SCNC: 4.3 MMOL/L (ref 3.5–5.3)
PROT SERPL-MCNC: 5.8 G/DL (ref 6.4–8.2)
RBC # BLD AUTO: 3.35 MILLION/UL (ref 3.81–5.12)
SODIUM SERPL-SCNC: 136 MMOL/L (ref 136–145)
WBC # BLD AUTO: 16.26 THOUSAND/UL (ref 4.31–10.16)

## 2021-06-20 PROCEDURE — 80053 COMPREHEN METABOLIC PANEL: CPT | Performed by: OBSTETRICS & GYNECOLOGY

## 2021-06-20 PROCEDURE — 94762 N-INVAS EAR/PLS OXIMTRY CONT: CPT

## 2021-06-20 PROCEDURE — 99024 POSTOP FOLLOW-UP VISIT: CPT | Performed by: STUDENT IN AN ORGANIZED HEALTH CARE EDUCATION/TRAINING PROGRAM

## 2021-06-20 PROCEDURE — 85027 COMPLETE CBC AUTOMATED: CPT | Performed by: OBSTETRICS & GYNECOLOGY

## 2021-06-20 PROCEDURE — 94760 N-INVAS EAR/PLS OXIMETRY 1: CPT

## 2021-06-20 RX ORDER — ACETAMINOPHEN 325 MG/1
650 TABLET ORAL EVERY 6 HOURS
Status: DISCONTINUED | OUTPATIENT
Start: 2021-06-20 | End: 2021-06-22 | Stop reason: HOSPADM

## 2021-06-20 RX ADMIN — DOCUSATE SODIUM 100 MG: 100 CAPSULE, LIQUID FILLED ORAL at 09:11

## 2021-06-20 RX ADMIN — NALBUPHINE HYDROCHLORIDE 5 MG: 10 INJECTION, SOLUTION INTRAMUSCULAR; INTRAVENOUS; SUBCUTANEOUS at 00:36

## 2021-06-20 RX ADMIN — MAGNESIUM SULFATE HEPTAHYDRATE 2 G/HR: 40 INJECTION, SOLUTION INTRAVENOUS at 02:17

## 2021-06-20 RX ADMIN — ACETAMINOPHEN 650 MG: 325 TABLET, FILM COATED ORAL at 17:07

## 2021-06-20 RX ADMIN — ACETAMINOPHEN 650 MG: 325 TABLET, FILM COATED ORAL at 02:10

## 2021-06-20 RX ADMIN — KETOROLAC TROMETHAMINE 30 MG: 30 INJECTION, SOLUTION INTRAMUSCULAR at 05:46

## 2021-06-20 RX ADMIN — NALBUPHINE HYDROCHLORIDE 5 MG: 10 INJECTION, SOLUTION INTRAMUSCULAR; INTRAVENOUS; SUBCUTANEOUS at 13:44

## 2021-06-20 RX ADMIN — KETOROLAC TROMETHAMINE 30 MG: 30 INJECTION, SOLUTION INTRAMUSCULAR at 00:15

## 2021-06-20 RX ADMIN — DOCUSATE SODIUM 100 MG: 100 CAPSULE, LIQUID FILLED ORAL at 17:07

## 2021-06-20 RX ADMIN — ACETAMINOPHEN 650 MG: 325 TABLET, FILM COATED ORAL at 09:11

## 2021-06-20 NOTE — PLAN OF CARE
Problem: Knowledge Deficit  Goal: Patient/family/caregiver demonstrates understanding of disease process, treatment plan, medications, and discharge instructions  Description: Complete learning assessment and assess knowledge base    Interventions:  - Provide teaching at level of understanding  - Provide teaching via preferred learning methods  Outcome: Completed     Problem: PAIN - ADULT  Goal: Verbalizes/displays adequate comfort level or baseline comfort level  Description: Interventions:  - Encourage patient to monitor pain and request assistance  - Assess pain using appropriate pain scale  - Administer analgesics based on type and severity of pain and evaluate response  - Implement non-pharmacological measures as appropriate and evaluate response  - Consider cultural and social influences on pain and pain management  - Notify physician/advanced practitioner if interventions unsuccessful or patient reports new pain  6/19/2021 2312 by Lisset Grigsby RN  Outcome: Progressing  6/19/2021 2311 by Lisset Grigsby RN  Outcome: Progressing     Problem: INFECTION - ADULT  Goal: Absence or prevention of progression during hospitalization  Description: INTERVENTIONS:  - Assess and monitor for signs and symptoms of infection  - Monitor lab/diagnostic results  - Monitor all insertion sites, i e  indwelling lines, tubes, and drains  - Monitor endotracheal if appropriate and nasal secretions for changes in amount and color  - Sullivan appropriate cooling/warming therapies per order  - Administer medications as ordered  - Instruct and encourage patient and family to use good hand hygiene technique  - Identify and instruct in appropriate isolation precautions for identified infection/condition  6/19/2021 2312 by Lisset Grigsby RN  Outcome: Progressing  6/19/2021 2311 by Lisset Grigsby RN  Outcome: Progressing  Goal: Absence of fever/infection during neutropenic period  Description: INTERVENTIONS:  - Monitor WBC    6/19/2021 2312 by Magali Sharma RN  Outcome: Progressing  6/19/2021 2311 by Magali Sharma RN  Outcome: Progressing     Problem: SAFETY ADULT  Goal: Patient will remain free of falls  Description: INTERVENTIONS:  - Educate patient/family on patient safety including physical limitations  - Instruct patient to call for assistance with activity   - Consult OT/PT to assist with strengthening/mobility   - Keep Call bell within reach  - Keep bed low and locked with side rails adjusted as appropriate  - Keep care items and personal belongings within reach  - Initiate and maintain comfort rounds  - Make Fall Risk Sign visible to staff  - Offer Toileting every  Hours, in advance of need  - Initiate/Maintain alarm  - Obtain necessary fall risk management equipment:   - Apply yellow socks and bracelet for high fall risk patients  - Consider moving patient to room near nurses station  6/19/2021 2312 by Magali Sharma RN  Outcome: Progressing  6/19/2021 2311 by Magali Sharma RN  Outcome: Progressing  Goal: Maintain or return to baseline ADL function  Description: INTERVENTIONS:  -  Assess patient's ability to carry out ADLs; assess patient's baseline for ADL function and identify physical deficits which impact ability to perform ADLs (bathing, care of mouth/teeth, toileting, grooming, dressing, etc )  - Assess/evaluate cause of self-care deficits   - Assess range of motion  - Assess patient's mobility; develop plan if impaired  - Assess patient's need for assistive devices and provide as appropriate  - Encourage maximum independence but intervene and supervise when necessary  - Involve family in performance of ADLs  - Assess for home care needs following discharge   - Consider OT consult to assist with ADL evaluation and planning for discharge  - Provide patient education as appropriate  6/19/2021 2312 by Magali Sharma RN  Outcome: Progressing  6/19/2021 2311 by Magali Sharma RN  Outcome: Progressing  Goal: Maintains/Returns to pre admission functional level  Description: INTERVENTIONS:  - Perform BMAT or MOVE assessment daily    - Set and communicate daily mobility goal to care team and patient/family/caregiver  - Collaborate with rehabilitation services on mobility goals if consulted  - Perform Range of Motion  times a day  - Reposition patient every  hours    - Dangle patient  times a day  - Stand patient  times a day  - Ambulate patient  times a day  - Out of bed to chair  times a day   - Out of bed for meals  times a day  - Out of bed for toileting  - Record patient progress and toleration of activity level   2021 by Mary Young RN  Outcome: Progressing  2021 by Mary Young RN  Outcome: Progressing     Problem: DISCHARGE PLANNING  Goal: Discharge to home or other facility with appropriate resources  Description: INTERVENTIONS:  - Identify barriers to discharge w/patient and caregiver  - Arrange for needed discharge resources and transportation as appropriate  - Identify discharge learning needs (meds, wound care, etc )  - Arrange for interpretive services to assist at discharge as needed  - Refer to Case Management Department for coordinating discharge planning if the patient needs post-hospital services based on physician/advanced practitioner order or complex needs related to functional status, cognitive ability, or social support system  2021 by Mary Young RN  Outcome: Progressing  2021 by Mayr Young RN  Outcome: Progressing     Problem: POSTPARTUM  Goal: Experiences normal postpartum course  Description: INTERVENTIONS:  - Monitor maternal vital signs  - Assess uterine involution and lochia  Outcome: Progressing  Goal: Appropriate maternal -  bonding  Description: INTERVENTIONS:  - Identify family support  - Assess for appropriate maternal/infant bonding   -Encourage maternal/infant bonding opportunities  - Referral to  or  as needed  Outcome: Progressing  Goal: Establishment of infant feeding pattern  Description: INTERVENTIONS:  - Assess breast/bottle feeding  - Refer to lactation as needed  Outcome: Progressing  Goal: Incision(s), wounds(s) or drain site(s) healing without S/S of infection  Description: INTERVENTIONS  - Assess and document dressing, incision, wound bed, drain sites and surrounding tissue  - Provide patient and family education  - Perform skin care/dressing changes every   Outcome: Progressing     Problem: ALTERATION IN THE BREAST  Goal: Optimize infant feeding at the breast  Description: INTERVENTIONS:  - Latch, breast and nipple assessment  - Assess prior breast feeding history  - Hand expression of breast milk  - For cracked, bleeding and or sore nipples reassess latch, treat damaged nipple  -Educate mother on feeding cues  -Positioning/latch techniques  Outcome: Progressing     Problem: INADEQUATE LATCH, SUCK OR SWALLOW  Goal: Demonstrate ability to latch and sustain latch, audible swallowing and satiety  Description: INTERVENTIONS:  - Assess oral anatomy, notify Physician/AP for abnormal findings  - Establish milk expression  - Maximize feeding opportunity (skin to skin, behavioral state)  - Position/latch techniques  - Discourage use of pacifier-artificial nipple  - Mechanical pumping  - Nipple Shield  - Supplemental formula feeding (Physician/AP order)  - Alternative feeding method  Outcome: Progressing

## 2021-06-20 NOTE — PROGRESS NOTES
Progress Note - OB/GYN   Marti Delatorre 27 y o  female MRN: [id number]  Unit/Bed#: -01 Encounter: [id number]    Assessment:  27 y o  [place] [facility] s/p Primary low transverse  section for fetal intolerance, post-operative day 1  Pregnancy complicated by preeclampsia with severe features and developing GEMMA  Patient recovering well, Stable    Plan:  1  Postpartum  Continue routine post partum care  Pain management PRN  Encourage ambulation  Encourage breastfeeding    2  QBL pending  Hemoglobin 11 3 --> 8 9   cc/5 hours/121 kg = 0 87 CC/kg/H  Void trial today    3  Preeclampsia with severe features  Status post labetalol 20 mg, he declined 40 mg  Currently on magnesium, decreased to 1 g/H due to GEMMA described below, due for discontinuation at 1130  BP since delivery 120-162/    4  GEMMA  Creatinine 0 72 --> 0 80 --> 1 04  Decrease magnesium to 1 g/H  And hold NSAIDs  Plan labs for am- patient declines afternoon check    5   Discharge  Anticipate d/c pod 3/4      Subjective/Objective   Chief Complaint:    Postpartum state    Subjective:   Pain: yes, cramping, improved with meds  Tolerating PO: yes  Voiding: yes  Flatus: yes  BM: no  Ambulating: yes  Breastfeeding:  yes  Chest pain: no  Shortness of breath: no  Leg pain: no  Lochia: minimal    Objective:     Vitals: Temp:  [96 7 °F (35 9 °C)-98 8 °F (37 1 °C)] 98 8 °F (37 1 °C)  HR:  [] 91  Resp:  [17-20] 18  BP: ()/() 122/59       Intake/Output Summary (Last 24 hours) at 2021 4933  Last data filed at 2021  Gross per 24 hour   Intake 1200 ml   Output 2275 ml   Net -1075 ml         Physical Exam:   General: NAD, alert, oriented  Cardio: Regular rate and rhythm  Resp: nonlabored breathing  Abdomen: Soft, no distension/rebound/guarding/tenderness   Fundus: Firm, non-tender, fundus: 2 cm umbilicus  Incision: C/D/I, dressing overtop steri strips  G/U:  lochia noted on pad  Lower Extremities: Non-tender, no palpable cords    Medications:  Current Facility-Administered Medications   Medication Dose Route Frequency    acetaminophen (TYLENOL) tablet 650 mg  650 mg Oral Q6H    benzocaine-menthol-lanolin-aloe (DERMOPLAST) 20-0 5 % topical spray 1 application  1 application Topical J9B PRN    calcium carbonate (TUMS) chewable tablet 1,000 mg  1,000 mg Oral Daily PRN    dexamethasone (DECADRON) injection 8 mg  8 mg Intravenous Once PRN    diphenhydrAMINE (BENADRYL) tablet 25 mg  25 mg Oral Q6H PRN    docusate sodium (COLACE) capsule 100 mg  100 mg Oral BID    fentaNYL (SUBLIMAZE) injection 25 mcg  25 mcg Intravenous Q3 min PRN    hydrocortisone 1 % cream 1 application  1 application Topical Daily PRN    HYDROmorphone (DILAUDID) injection 0 2 mg  0 2 mg Intravenous Q5 Min PRN    HYDROmorphone (DILAUDID) injection 0 5 mg  0 5 mg Intravenous Q2H PRN    hydrOXYzine HCL (ATARAX) tablet 25 mg  25 mg Oral Q6H PRN    lactated ringers infusion  50 mL/hr Intravenous Continuous    magnesium sulfate 20 g/500 mL infusion (premix)  1 g/hr Intravenous Continuous    metoclopramide (REGLAN) injection 5 mg  5 mg Intravenous Q6H PRN    nalbuphine (NUBAIN) injection 5 mg  5 mg Intravenous Q3H PRN    naloxone (NARCAN) injection 0 1 mg  0 1 mg Intravenous Q3 min PRN    ondansetron (ZOFRAN) injection 4 mg  4 mg Intravenous Q4H PRN    ondansetron (ZOFRAN) injection 4 mg  4 mg Intravenous Once PRN    oxyCODONE (ROXICODONE) immediate release tablet 10 mg  10 mg Oral Q4H PRN    oxyCODONE (ROXICODONE) IR tablet 5 mg  5 mg Oral Q4H PRN    promethazine (PHENERGAN) injection 12 5 mg  12 5 mg Intravenous Once PRN    ropivacaine 0 2% PCEA   Epidural Continuous    simethicone (MYLICON) chewable tablet 80 mg  80 mg Oral 4x Daily PRN    witch hazel-glycerin (TUCKS) topical pad 1 pad  1 pad Topical Q4H PRN       Labs:   Recent Results (from the past 24 hour(s))   Blood gas, venous, cord    Collection Time: 06/19/21 11:35 AM   Result Value Ref Range    pH, Cord Drake 7 251 7 190 - 7 490    pCO2, Cord Drake 51 8 (H) 27 0 - 43 0 mm HG    pO2, Cord Drake 43 5 15 0 - 45 0 mm HG    HCO3, Cord Drake 22 3 12 2 - 28 6 mmol/L    Base Exc, Cord Drake -5 7 (L) 1 0 - 9 0 mmol/L    O2 Cont, Cord Drake 21 1 mL/dL    O2 HGB,VENOUS CORD 80 8 %   Blood gas, arterial, cord    Collection Time: 06/19/21 11:35 AM   Result Value Ref Range    pH, Cord Art 7 217 (L) 7 230 - 7 430    pCO2, Cord Art 57 8 30 0 - 60 0    pO2, Cord Art 21 0 5 0 - 25 0 mm HG    HCO3, Cord Art 23 0 17 3 - 27 3 mmol/L    Base Exc, Cord Art -5 9 (L) 3 0 - 11 0 mmol/L    O2 Content, Cord Art 9 6 ml/dl    O2 Hgb, Arterial Cord 38 3 %   CBC    Collection Time: 06/20/21 12:41 AM   Result Value Ref Range    WBC 16 26 (H) 4 31 - 10 16 Thousand/uL    RBC 3 35 (L) 3 81 - 5 12 Million/uL    Hemoglobin 8 9 (L) 11 5 - 15 4 g/dL    Hematocrit 28 5 (L) 34 8 - 46 1 %    MCV 85 82 - 98 fL    MCH 26 6 (L) 26 8 - 34 3 pg    MCHC 31 2 (L) 31 4 - 37 4 g/dL    RDW 15 3 (H) 11 6 - 15 1 %    Platelets 561 650 - 834 Thousands/uL    MPV 11 0 8 9 - 12 7 fL   Comprehensive metabolic panel    Collection Time: 06/20/21 12:41 AM   Result Value Ref Range    Sodium 136 136 - 145 mmol/L    Potassium 4 3 3 5 - 5 3 mmol/L    Chloride 106 100 - 108 mmol/L    CO2 24 21 - 32 mmol/L    ANION GAP 6 4 - 13 mmol/L    BUN 10 5 - 25 mg/dL    Creatinine 1 04 0 60 - 1 30 mg/dL    Glucose 125 65 - 140 mg/dL    Calcium 7 5 (L) 8 3 - 10 1 mg/dL    Corrected Calcium 9 1 8 3 - 10 1 mg/dL    AST 16 5 - 45 U/L    ALT 11 (L) 12 - 78 U/L    Alkaline Phosphatase 115 46 - 116 U/L    Total Protein 5 8 (L) 6 4 - 8 2 g/dL    Albumin 2 0 (L) 3 5 - 5 0 g/dL    Total Bilirubin 0 19 (L) 0 20 - 1 00 mg/dL    eGFR 87 ml/min/1 73sq m         Andres House  Ob/Gyn PGY-1  6/20/2021  6:37 AM     Teaching Physician Statement    Patient seen and examined separate from resident 6/20/21  Discussed patient with resident  Agree with resident's note as above   Agree with resident's plan of care to continue routine post op care, d/c mag at 24 hours doing well      Cielo Torres MD

## 2021-06-20 NOTE — LACTATION NOTE
Mom states she is pumping for her infant in NICU  Encouraged continued pumping every 3 hours and use of breast massage and hand expression  Given admission breastfeeding pkat and hand out on increasing milk supply for the infant in NICU  Encouraged to call for assistance as needed

## 2021-06-21 LAB
ALBUMIN SERPL BCP-MCNC: 2.2 G/DL (ref 3.5–5)
ALP SERPL-CCNC: 129 U/L (ref 46–116)
ALT SERPL W P-5'-P-CCNC: 10 U/L (ref 12–78)
ANION GAP SERPL CALCULATED.3IONS-SCNC: 4 MMOL/L (ref 4–13)
AST SERPL W P-5'-P-CCNC: 17 U/L (ref 5–45)
BILIRUB SERPL-MCNC: 0.13 MG/DL (ref 0.2–1)
BUN SERPL-MCNC: 12 MG/DL (ref 5–25)
CALCIUM ALBUM COR SERPL-MCNC: 9.5 MG/DL (ref 8.3–10.1)
CALCIUM SERPL-MCNC: 8.1 MG/DL (ref 8.3–10.1)
CHLORIDE SERPL-SCNC: 106 MMOL/L (ref 100–108)
CO2 SERPL-SCNC: 28 MMOL/L (ref 21–32)
CREAT SERPL-MCNC: 1.03 MG/DL (ref 0.6–1.3)
DME PARACHUTE DELIVERY DATE ACTUAL: NORMAL
DME PARACHUTE DELIVERY DATE REQUESTED: NORMAL
DME PARACHUTE ITEM DESCRIPTION: NORMAL
DME PARACHUTE ORDER STATUS: NORMAL
DME PARACHUTE SUPPLIER NAME: NORMAL
DME PARACHUTE SUPPLIER PHONE: NORMAL
ERYTHROCYTE [DISTWIDTH] IN BLOOD BY AUTOMATED COUNT: 15.9 % (ref 11.6–15.1)
GFR SERPL CREATININE-BSD FRML MDRD: 88 ML/MIN/1.73SQ M
GLUCOSE SERPL-MCNC: 89 MG/DL (ref 65–140)
HCT VFR BLD AUTO: 29.7 % (ref 34.8–46.1)
HGB BLD-MCNC: 9.1 G/DL (ref 11.5–15.4)
MCH RBC QN AUTO: 26.9 PG (ref 26.8–34.3)
MCHC RBC AUTO-ENTMCNC: 30.6 G/DL (ref 31.4–37.4)
MCV RBC AUTO: 88 FL (ref 82–98)
PLATELET # BLD AUTO: 225 THOUSANDS/UL (ref 149–390)
PMV BLD AUTO: 10.2 FL (ref 8.9–12.7)
POTASSIUM SERPL-SCNC: 4.8 MMOL/L (ref 3.5–5.3)
PROT SERPL-MCNC: 6.6 G/DL (ref 6.4–8.2)
RBC # BLD AUTO: 3.38 MILLION/UL (ref 3.81–5.12)
SODIUM SERPL-SCNC: 138 MMOL/L (ref 136–145)
WBC # BLD AUTO: 11.42 THOUSAND/UL (ref 4.31–10.16)

## 2021-06-21 PROCEDURE — 80053 COMPREHEN METABOLIC PANEL: CPT | Performed by: STUDENT IN AN ORGANIZED HEALTH CARE EDUCATION/TRAINING PROGRAM

## 2021-06-21 PROCEDURE — 99024 POSTOP FOLLOW-UP VISIT: CPT | Performed by: STUDENT IN AN ORGANIZED HEALTH CARE EDUCATION/TRAINING PROGRAM

## 2021-06-21 PROCEDURE — 85027 COMPLETE CBC AUTOMATED: CPT | Performed by: STUDENT IN AN ORGANIZED HEALTH CARE EDUCATION/TRAINING PROGRAM

## 2021-06-21 RX ORDER — IBUPROFEN 600 MG/1
600 TABLET ORAL EVERY 6 HOURS SCHEDULED
Status: DISCONTINUED | OUTPATIENT
Start: 2021-06-21 | End: 2021-06-22 | Stop reason: HOSPADM

## 2021-06-21 RX ADMIN — SIMETHICONE 80 MG: 80 TABLET, CHEWABLE ORAL at 09:08

## 2021-06-21 RX ADMIN — OXYCODONE HYDROCHLORIDE 10 MG: 10 TABLET ORAL at 04:33

## 2021-06-21 RX ADMIN — DOCUSATE SODIUM 100 MG: 100 CAPSULE, LIQUID FILLED ORAL at 18:41

## 2021-06-21 RX ADMIN — DOCUSATE SODIUM 100 MG: 100 CAPSULE, LIQUID FILLED ORAL at 09:08

## 2021-06-21 RX ADMIN — SIMETHICONE 80 MG: 80 TABLET, CHEWABLE ORAL at 15:57

## 2021-06-21 RX ADMIN — IBUPROFEN 600 MG: 600 TABLET, FILM COATED ORAL at 22:14

## 2021-06-21 RX ADMIN — IBUPROFEN 600 MG: 600 TABLET, FILM COATED ORAL at 15:49

## 2021-06-21 RX ADMIN — ACETAMINOPHEN 650 MG: 325 TABLET ORAL at 18:40

## 2021-06-21 RX ADMIN — ACETAMINOPHEN 650 MG: 325 TABLET ORAL at 10:59

## 2021-06-21 RX ADMIN — OXYCODONE HYDROCHLORIDE 5 MG: 5 TABLET ORAL at 00:20

## 2021-06-21 RX ADMIN — ACETAMINOPHEN 650 MG: 325 TABLET ORAL at 00:20

## 2021-06-21 RX ADMIN — ACETAMINOPHEN 650 MG: 325 TABLET ORAL at 04:33

## 2021-06-21 RX ADMIN — SIMETHICONE 80 MG: 80 TABLET, CHEWABLE ORAL at 04:34

## 2021-06-21 RX ADMIN — DIPHENHYDRAMINE HCL 25 MG: 25 TABLET, COATED ORAL at 04:56

## 2021-06-21 RX ADMIN — OXYCODONE HYDROCHLORIDE 10 MG: 10 TABLET ORAL at 09:08

## 2021-06-21 NOTE — UTILIZATION REVIEW
Continued Stay Review    Date: 21                        Current Patient Class: inpatient  Current Level of Care: *medical  HPI:26 y o  female initially admitted on *21  IOL    21  @ 1420   Dose (yasmany-units/min) Oxytocin: 4 yasmany-units/min  Contraction Frequency (minutes): 3-3 5  Contraction Quality: Mild  Tachysystole: No   Cervical Dilation: 2  Cervical Effacement: 30  Fetal Station: -3  Baseline Rate: 135 bpm  Fetal Heart Rate: 132 BPM  FHR Category: Category I    @ 5032  Exam unchanged    Had /112 at 1823  Ordered labetalol 40mg IV  Patient declined this  Repeat /87 at 1832, 149/91 at 1842      21 @ 0718  S/Comfortable with epidural  SVE 2-3/50/high  FHRT 145 moderate (+)accels, no decels  [place] irregular, inconsistent      21yo  at 41 0 weeks gestational age with preeclampsia with severe features  (1) Preeclampsia with severe features     --> Continue magnesium sulfate  --> Continue to move toward delivery      (2) Pain control  Just got epidural and now comfortable  --> Continue epidural      (3) Induction  Arrived evening of 21  Started with misoprostol and moon ( at 0630)  Moon removed  at 1830  Then started oxytocin  Has been on and off oxytocin due to patient desires  Now consistently on oxytocin  Cervix still only 2-3cm dilated    --> Will continue induction this morning; may need to abandon if no progress      (2) Fetal status category I   --> Continuous fetal monitoring     21@ 41 wks  Male 30 90 grams  Apgar 1/4  Baby taken to NICU       Pertinent Labs/Diagnostic Results:       Results from last 7 days   Lab Units 21  0453 21  0041 21  2134   WBC Thousand/uL 11 42* 16 26* 11 60* 8 37   HEMOGLOBIN g/dL 9 1* 8 9* 11 3* 10 9*   HEMATOCRIT % 29 7* 28 5* 36 0 33 3*   PLATELETS Thousands/uL 225 222 233 243         Results from last 7 days   Lab Units 21  0453 21  0041 21 06/17/21  0849   SODIUM mmol/L 138 136 138 140   POTASSIUM mmol/L 4 8 4 3 4 0 3 7   CHLORIDE mmol/L 106 106 105 105   CO2 mmol/L 28 24 22 21   ANION GAP mmol/L 4 6 11 14*   BUN mg/dL 12 10 7 11   CREATININE mg/dL 1 03 1 04 0 80 0 72   EGFR ml/min/1 73sq m 88 87 119 136   CALCIUM mg/dL 8 1* 7 5* 8 7 8 9   MAGNESIUM mg/dL  --   --  3 7*  --      Results from last 7 days   Lab Units 06/21/21 0453 06/20/21 0041 06/18/21 1940 06/17/21  0849   AST U/L 17 16 19 17   ALT U/L 10* 11* 13 13   ALK PHOS U/L 129* 115 145* 131*   TOTAL PROTEIN g/dL 6 6 5 8* 6 9 7 1   ALBUMIN g/dL 2 2* 2 0* 2 5* 2 8*   TOTAL BILIRUBIN mg/dL 0 13* 0 19* 0 28 0 16*         Results from last 7 days   Lab Units 06/21/21 0453 06/20/21 0041 06/18/21 1940 06/17/21  0849   GLUCOSE RANDOM mg/dL 89 125 90 56*             No results found for: BETA-HYDROXYBUTYRATE                                                                           Results from last 7 days   Lab Units 06/17/21  0849 06/15/21  1436   GLUCOSE UA   --  negative   PROTEIN UA   --  negative   CREATININE UR mg/dL 88 0  --    PROTEIN UR mg/dL 13  --    PROT/CREAT RATIO UR  0 15*  --                                                  Medications:   Scheduled Medications:  acetaminophen, 650 mg, Oral, Q6H  docusate sodium, 100 mg, Oral, BID      Continuous IV Infusions:  lactated ringers, 50 mL/hr, Intravenous, Continuous  magnesium sulfate, 1 g/hr, Intravenous, Continuous  ropivacaine 0 2%, , Epidural, Continuous      PRN Meds:  benzocaine-menthol-lanolin-aloe, 1 application, Topical, Y4U PRN  calcium carbonate, 1,000 mg, Oral, Daily PRN  diphenhydrAMINE, 25 mg, Oral, Q6H PRN  fentaNYL, 25 mcg, Intravenous, Q3 min PRN  hydrocortisone, 1 application, Topical, Daily PRN  HYDROmorphone, 0 2 mg, Intravenous, Q5 Min PRN  HYDROmorphone, 0 5 mg, Intravenous, Q2H PRN  hydrOXYzine HCL, 25 mg, Oral, Q6H PRN  nalbuphine, 5 mg, Intravenous, Q3H PRN  ondansetron, 4 mg, Intravenous, Once PRN  oxyCODONE, 10 mg, Oral, Q4H PRN  oxyCODONE, 5 mg, Oral, Q4H PRN  promethazine, 12 5 mg, Intravenous, Once PRN  simethicone, 80 mg, Oral, 4x Daily PRN  witch hazel-glycerin, 1 pad, Topical, Q4H PRN        Discharge Plan: home after delivery      Network Utilization Review Department  ATTENTION: Please call with any questions or concerns to [phone] and carefully listen to the prompts so that you are directed to the right person  All voicemails are confidential   Farrel Bodily all requests for admission clinical reviews, approved or denied determinations and any other requests to dedicated fax number below belonging to the campus where the patient is receiving treatment   List of dedicated fax numbers for the Facilities:  [address] DENIALS (Administrative/Medical Necessity) [phone]   [address] (Maternity/NICU/Pediatrics) [phone]   [address] Rony Wattel [place] 3850 [id number] [address] [facility] [facility] [phone]

## 2021-06-21 NOTE — CASE MANAGEMENT
Breast pump consult: [place] order placed for Spectra S2 pump from [employer] for room delivery

## 2021-06-21 NOTE — PLAN OF CARE
Problem: PAIN - ADULT  Goal: Verbalizes/displays adequate comfort level or baseline comfort level  Description: Interventions:  - Encourage patient to monitor pain and request assistance  - Assess pain using appropriate pain scale  - Administer analgesics based on type and severity of pain and evaluate response  - Implement non-pharmacological measures as appropriate and evaluate response  - Consider cultural and social influences on pain and pain management  - Notify physician/advanced practitioner if interventions unsuccessful or patient reports new pain  Outcome: Progressing     Problem: INFECTION - ADULT  Goal: Absence or prevention of progression during hospitalization  Description: INTERVENTIONS:  - Assess and monitor for signs and symptoms of infection  - Monitor lab/diagnostic results  - Monitor all insertion sites, i e  indwelling lines, tubes, and drains  - Monitor endotracheal if appropriate and nasal secretions for changes in amount and color  - [place] appropriate cooling/warming therapies per order  - Administer medications as ordered  - Instruct and encourage patient and family to use good hand hygiene technique  - Identify and instruct in appropriate isolation precautions for identified infection/condition  Outcome: Progressing  Goal: Absence of fever/infection during neutropenic period  Description: INTERVENTIONS:  - Monitor WBC    Outcome: Progressing     Problem: SAFETY ADULT  Goal: Patient will remain free of falls  Description: INTERVENTIONS:  - Educate patient/family on patient safety including physical limitations  - Instruct patient to call for assistance with activity   - Consult OT/PT to assist with strengthening/mobility   - Keep Call bell within reach  - Keep bed low and locked with side rails adjusted as appropriate  - Keep care items and personal belongings within reach  - Initiate and maintain comfort rounds  - Make Fall Risk Sign visible to staff  - Offer Toileting every  Hours, in advance of need  - Initiate/Maintain alarm  - Obtain necessary fall risk management equipment:   - Apply yellow socks and bracelet for high fall risk patients  - Consider moving patient to room near nurses station  Outcome: Progressing  Goal: Maintain or return to baseline ADL function  Description: INTERVENTIONS:  -  Assess patient's ability to carry out ADLs; assess patient's baseline for ADL function and identify physical deficits which impact ability to perform ADLs (bathing, care of mouth/teeth, toileting, grooming, dressing, etc )  - Assess/evaluate cause of self-care deficits   - Assess range of motion  - Assess patient's mobility; develop plan if impaired  - Assess patient's need for assistive devices and provide as appropriate  - Encourage maximum independence but intervene and supervise when necessary  - Involve family in performance of ADLs  - Assess for home care needs following discharge   - Consider OT consult to assist with ADL evaluation and planning for discharge  - Provide patient education as appropriate  Outcome: Progressing  Goal: Maintains/Returns to pre admission functional level  Description: INTERVENTIONS:  - Perform BMAT or MOVE assessment daily    - Set and communicate daily mobility goal to care team and patient/family/caregiver  - Collaborate with rehabilitation services on mobility goals if consulted  - Perform Range of Motion  times a day  - Reposition patient every  hours    - Dangle patient  times a day  - Stand patient  times a day  - Ambulate patient  times a day  - Out of bed to chair  times a day   - Out of bed for meals  times a day  - Out of bed for toileting  - Record patient progress and toleration of activity level   Outcome: Progressing     Problem: DISCHARGE PLANNING  Goal: Discharge to home or other facility with appropriate resources  Description: INTERVENTIONS:  - Identify barriers to discharge w/patient and caregiver  - Arrange for needed discharge resources and transportation as appropriate  - Identify discharge learning needs (meds, wound care, etc )  - Arrange for interpretive services to assist at discharge as needed  - Refer to Case Management Department for coordinating discharge planning if the patient needs post-hospital services based on physician/advanced practitioner order or complex needs related to functional status, cognitive ability, or social support system  Outcome: Progressing     Problem: POSTPARTUM  Goal: Experiences normal postpartum course  Description: INTERVENTIONS:  - Monitor maternal vital signs  - Assess uterine involution and lochia  Outcome: Progressing  Goal: Appropriate maternal -  bonding  Description: INTERVENTIONS:  - Identify family support  - Assess for appropriate maternal/infant bonding   -Encourage maternal/infant bonding opportunities  - Referral to  or  as needed  Outcome: Progressing  Goal: Establishment of infant feeding pattern  Description: INTERVENTIONS:  - Assess breast/bottle feeding  - Refer to lactation as needed  Outcome: Progressing  Goal: Incision(s), wounds(s) or drain site(s) healing without S/S of infection  Description: INTERVENTIONS  - Assess and document dressing, incision, wound bed, drain sites and surrounding tissue  - Provide patient and family education  - Perform skin care/dressing changes every   Outcome: Progressing     Problem: ALTERATION IN THE BREAST  Goal: Optimize infant feeding at the breast  Description: INTERVENTIONS:  - Latch, breast and nipple assessment  - Assess prior breast feeding history  - Hand expression of breast milk  - For cracked, bleeding and or sore nipples reassess latch, treat damaged nipple  -Educate mother on feeding cues  -Positioning/latch techniques  Outcome: Progressing     Problem: INADEQUATE LATCH, SUCK OR SWALLOW  Goal: Demonstrate ability to latch and sustain latch, audible swallowing and satiety  Description: INTERVENTIONS:  - Assess oral anatomy, notify Physician/AP for abnormal findings  - Establish milk expression  - Maximize feeding opportunity (skin to skin, behavioral state)  - Position/latch techniques  - Discourage use of pacifier-artificial nipple  - Mechanical pumping  - Nipple Shield  - Supplemental formula feeding (Physician/AP order)  - Alternative feeding method  Outcome: Progressing

## 2021-06-21 NOTE — PROGRESS NOTES
Progress Note - OB/GYN   Rojo Kwame 27 y o  female MRN: [id number]  Unit/Bed#: -01 Encounter: [id number]    Assessment:  POD#2 s/p primary low transverse  section for: fetal intolerance of labor, inability to augment labor, failed induction of labor, stable    Plan:  1) Post-op care  Encourage ambulation   Encourage breastfeeding  Continue current meds   Hgb 11 3 --> 8 9  2) Ceja catheter   Out, passed voiding trial  3) PreE w/ SF   S/p 24 hrs of magnesium   Received labetalol 20 IV push x1, refused 40   Asx this AM    Bps 110-130/60-90   Creatinine stable 1 04 --> 1 03  4) Baby in NICU   Admitted for respiratory distress   Was intubated at time of deliveru   Was able to watch on camera rosario during my exam this AM   5) Disposition: Home POD 3    Subjective/Objective     Subjective:     Pain: no  Tolerating PO: yes  Voiding: yes  Flatus: yes  BM: no  Ambulating: yes  Breastfeeding: Breastfeeding and Using breast pump  Chest pain: no  Shortness of breath: no  Leg pain: no    Objective:     Vitals:  Vitals:    21 1200 21 1705 21 2020 21 0400   BP: 112/61 149/96 123/76 131/74   BP Location:  Right arm Right arm Right arm   Pulse: 96 102 100 95   Resp: 18 18 18 18   Temp: 98 4 °F (36 9 °C) 97 8 °F (36 6 °C) 98 4 °F (36 9 °C) 98 8 °F (37 1 °C)   TempSrc: Oral Oral Oral Oral   SpO2: 98% 98%  98%   Weight:       Height:           Physical Exam:   GEN:appears well, alert and oriented x 3, pleasant and cooperative   CV: RRR, no m/r/g  Lungs: CTA b/l, no w/r/r  ABDOMEN: soft, moderate tenderness throughout abdomen,   Uterine fundus firm and non-tender, at the umbilicus, Incision C/D/I  EXTREMITIES: non-tender, no edema       Lab Results   Component Value Date    WBC 11 42 (H) 2021    HGB 9 1 (L) 2021    HCT 29 7 (L) 2021    MCV 88 2021     2021           Gigi Chambers, DO, PGY-1  Obstetrics & Gynecology  21  6:45 AM

## 2021-06-21 NOTE — PLAN OF CARE
Problem: PAIN - ADULT  Goal: Verbalizes/displays adequate comfort level or baseline comfort level  Description: Interventions:  - Encourage patient to monitor pain and request assistance  - Assess pain using appropriate pain scale  - Administer analgesics based on type and severity of pain and evaluate response  - Implement non-pharmacological measures as appropriate and evaluate response  - Consider cultural and social influences on pain and pain management  - Notify physician/advanced practitioner if interventions unsuccessful or patient reports new pain  6/21/2021 0801 by Sunshine Hull RN  Outcome: Progressing  6/21/2021 0800 by Sunshine Hull RN  Outcome: Progressing     Problem: INFECTION - ADULT  Goal: Absence or prevention of progression during hospitalization  Description: INTERVENTIONS:  - Assess and monitor for signs and symptoms of infection  - Monitor lab/diagnostic results  - Monitor all insertion sites, i e  indwelling lines, tubes, and drains  - Monitor endotracheal if appropriate and nasal secretions for changes in amount and color  - [place] appropriate cooling/warming therapies per order  - Administer medications as ordered  - Instruct and encourage patient and family to use good hand hygiene technique  - Identify and instruct in appropriate isolation precautions for identified infection/condition  6/21/2021 0801 by Sunshine Hull RN  Outcome: Progressing  6/21/2021 0800 by Sunshine Hull RN  Outcome: Progressing  Goal: Absence of fever/infection during neutropenic period  Description: INTERVENTIONS:  - Monitor WBC    6/21/2021 0801 by Sunshine Hull RN  Outcome: Progressing  6/21/2021 0800 by Sunshine Hull RN  Outcome: Progressing     Problem: SAFETY ADULT  Goal: Patient will remain free of falls  Description: INTERVENTIONS:  - Educate patient/family on patient safety including physical limitations  - Instruct patient to call for assistance with activity   - Consult OT/PT to assist with strengthening/mobility   - Keep Call bell within reach  - Keep bed low and locked with side rails adjusted as appropriate  - Keep care items and personal belongings within reach  - Initiate and maintain comfort rounds  - Make Fall Risk Sign visible to staff  - Offer Toileting every  Hours, in advance of need  - Initiate/Maintain alarm  - Obtain necessary fall risk management equipment:   - Apply yellow socks and bracelet for high fall risk patients  - Consider moving patient to room near nurses station  Outcome: Progressing  Goal: Maintain or return to baseline ADL function  Description: INTERVENTIONS:  -  Assess patient's ability to carry out ADLs; assess patient's baseline for ADL function and identify physical deficits which impact ability to perform ADLs (bathing, care of mouth/teeth, toileting, grooming, dressing, etc )  - Assess/evaluate cause of self-care deficits   - Assess range of motion  - Assess patient's mobility; develop plan if impaired  - Assess patient's need for assistive devices and provide as appropriate  - Encourage maximum independence but intervene and supervise when necessary  - Involve family in performance of ADLs  - Assess for home care needs following discharge   - Consider OT consult to assist with ADL evaluation and planning for discharge  - Provide patient education as appropriate  Outcome: Progressing  Goal: Maintains/Returns to pre admission functional level  Description: INTERVENTIONS:  - Perform BMAT or MOVE assessment daily    - Set and communicate daily mobility goal to care team and patient/family/caregiver  - Collaborate with rehabilitation services on mobility goals if consulted  - Perform Range of Motion  times a day  - Reposition patient every  hours    - Dangle patient  times a day  - Stand patient  times a day  - Ambulate patient  times a day  - Out of bed to chair  times a day   - Out of bed for meals  times a day  - Out of bed for toileting  - Record patient progress and toleration of activity level   Outcome: Progressing     Problem: DISCHARGE PLANNING  Goal: Discharge to home or other facility with appropriate resources  Description: INTERVENTIONS:  - Identify barriers to discharge w/patient and caregiver  - Arrange for needed discharge resources and transportation as appropriate  - Identify discharge learning needs (meds, wound care, etc )  - Arrange for interpretive services to assist at discharge as needed  - Refer to Case Management Department for coordinating discharge planning if the patient needs post-hospital services based on physician/advanced practitioner order or complex needs related to functional status, cognitive ability, or social support system  Outcome: Progressing     Problem: POSTPARTUM  Goal: Experiences normal postpartum course  Description: INTERVENTIONS:  - Monitor maternal vital signs  - Assess uterine involution and lochia  Outcome: Progressing  Goal: Appropriate maternal -  bonding  Description: INTERVENTIONS:  - Identify family support  - Assess for appropriate maternal/infant bonding   -Encourage maternal/infant bonding opportunities  - Referral to  or  as needed  Outcome: Progressing  Goal: Establishment of infant feeding pattern  Description: INTERVENTIONS:  - Assess breast/bottle feeding  - Refer to lactation as needed  Outcome: Progressing  Goal: Incision(s), wounds(s) or drain site(s) healing without S/S of infection  Description: INTERVENTIONS  - Assess and document dressing, incision, wound bed, drain sites and surrounding tissue  - Provide patient and family education  - Perform skin care/dressing changes every   Outcome: Progressing     Problem: ALTERATION IN THE BREAST  Goal: Optimize infant feeding at the breast  Description: INTERVENTIONS:  - Latch, breast and nipple assessment  - Assess prior breast feeding history  - Hand expression of breast milk  - For cracked, bleeding and or sore nipples reassess latch, treat damaged nipple  -Educate mother on feeding cues  -Positioning/latch techniques  Outcome: Progressing     Problem: INADEQUATE LATCH, SUCK OR SWALLOW  Goal: Demonstrate ability to latch and sustain latch, audible swallowing and satiety  Description: INTERVENTIONS:  - Assess oral anatomy, notify Physician/AP for abnormal findings  - Establish milk expression  - Maximize feeding opportunity (skin to skin, behavioral state)  - Position/latch techniques  - Discourage use of pacifier-artificial nipple  - Mechanical pumping  - Nipple Shield  - Supplemental formula feeding (Physician/AP order)  - Alternative feeding method  Outcome: Progressing

## 2021-06-22 VITALS
HEIGHT: 67 IN | HEART RATE: 105 BPM | TEMPERATURE: 98.6 F | SYSTOLIC BLOOD PRESSURE: 126 MMHG | BODY MASS INDEX: 41.75 KG/M2 | WEIGHT: 266 LBS | DIASTOLIC BLOOD PRESSURE: 83 MMHG | RESPIRATION RATE: 18 BRPM | OXYGEN SATURATION: 98 %

## 2021-06-22 PROCEDURE — 99024 POSTOP FOLLOW-UP VISIT: CPT | Performed by: OBSTETRICS & GYNECOLOGY

## 2021-06-22 RX ORDER — IBUPROFEN 600 MG/1
600 TABLET ORAL EVERY 6 HOURS SCHEDULED
Qty: 30 TABLET | Refills: 0 | Status: SHIPPED | OUTPATIENT
Start: 2021-06-22 | End: 2021-07-12 | Stop reason: ALTCHOICE

## 2021-06-22 RX ORDER — OXYCODONE HYDROCHLORIDE 5 MG/1
5 TABLET ORAL EVERY 4 HOURS PRN
Qty: 20 TABLET | Refills: 0 | Status: SHIPPED | OUTPATIENT
Start: 2021-06-22 | End: 2021-07-02

## 2021-06-22 RX ORDER — FERROUS SULFATE TAB EC 324 MG (65 MG FE EQUIVALENT) 324 (65 FE) MG
324 TABLET DELAYED RESPONSE ORAL
Qty: 30 TABLET | Refills: 0 | Status: SHIPPED | OUTPATIENT
Start: 2021-06-22

## 2021-06-22 RX ORDER — ACETAMINOPHEN 325 MG/1
650 TABLET ORAL EVERY 6 HOURS
Qty: 30 TABLET | Refills: 0 | Status: SHIPPED | OUTPATIENT
Start: 2021-06-22

## 2021-06-22 RX ORDER — DOCUSATE SODIUM 100 MG/1
100 CAPSULE, LIQUID FILLED ORAL 2 TIMES DAILY
Qty: 20 CAPSULE | Refills: 0 | Status: SHIPPED | OUTPATIENT
Start: 2021-06-22

## 2021-06-22 RX ORDER — SIMETHICONE 80 MG
80 TABLET,CHEWABLE ORAL 4 TIMES DAILY PRN
Qty: 30 TABLET | Refills: 0 | Status: SHIPPED | OUTPATIENT
Start: 2021-06-22 | End: 2021-07-12 | Stop reason: ALTCHOICE

## 2021-06-22 RX ADMIN — OXYCODONE HYDROCHLORIDE 5 MG: 5 TABLET ORAL at 09:22

## 2021-06-22 RX ADMIN — IBUPROFEN 600 MG: 600 TABLET, FILM COATED ORAL at 10:17

## 2021-06-22 RX ADMIN — ACETAMINOPHEN 650 MG: 325 TABLET ORAL at 09:16

## 2021-06-22 RX ADMIN — DOCUSATE SODIUM 100 MG: 100 CAPSULE, LIQUID FILLED ORAL at 09:17

## 2021-06-22 RX ADMIN — IBUPROFEN 600 MG: 600 TABLET, FILM COATED ORAL at 03:55

## 2021-06-22 NOTE — PROGRESS NOTES
Progress Note - OB/GYN   Shabnam Field 27 y o  female MRN: [id number]  Unit/Bed#:  313-01 Encounter: [id number]    Assessment:  POD#3 s/p primary low transverse  section for: fetal intolerance of labor, inability to augment labor, failed induction of labor, stable    Plan:  1) Post-op care  Encourage ambulation   Encourage breastfeeding  Continue current meds   Hgb 11 3 --> 8 9  Refused venofer, amenable to taking oral iron  2) Ceja catheter   Out, passed voiding trial  3) PreE w/ SF   S/p 24 hrs of magnesium   Bps 120-130/80-90   Will follow-up in 1 week for blood pressure check  4) Baby discharged from NICU, she was bottle-feeding on my exam this morning  5) Medication regimen   Had long discussion with Talya's mother   She desires us to prescribe all medications so that she does not need to buy  over-the-counter   She states that she feels like prescribed ibuprofen 600 mg is better than taking the "little blue pills" that she has at home   I discussed that I will prescribe everything for her that is covered by her insurance, but it may be less expensive to get over-the-counter medication depending on her insurance plan   She requests Tylenol, Motrin, simethicone, Colace, iron  6) Disposition: Home today, POD3    Subjective/Objective     Subjective:     Pain: no  Tolerating PO: yes  Voiding: yes  Flatus: yes  BM: no  Ambulating: yes  Breastfeeding: Breastfeeding and Using breast pump  Chest pain: no  Shortness of breath: no  Leg pain: no    Objective:     Vitals:  Vitals:    21 0908 21 1129 21 1648 21 0000   BP: 127/84 135/87 152/91 136/86   BP Location: Right arm Right arm Right arm Right arm   Pulse: 80 (!) 108 (!) 108 (!) 114   Resp: 18 18 18 18   Temp: 98 5 °F (36 9 °C) 98 9 °F (37 2 °C) 98 8 °F (37 1 °C) 98 8 °F (37 1 °C)   TempSrc: Axillary Oral Oral Oral   SpO2:   98%    Weight:       Height:           Physical Exam:   GEN:appears well, alert and oriented x 3, pleasant and cooperative   CV: RRR, no m/r/g  Lungs: CTA b/l, no w/r/r  ABDOMEN: soft, moderate tenderness throughout abdomen,   Uterine fundus firm and non-tender, at the umbilicus, Incision C/D/I  EXTREMITIES: non-tender, no edema       Lab Results   Component Value Date    WBC 11 42 (H) 06/21/2021    HGB 9 1 (L) 06/21/2021    HCT 29 7 (L) 06/21/2021    MCV 88 06/21/2021     06/21/2021           Enma Adkins DO, PGY-1  Obstetrics & Gynecology  06/22/21  6:53 AM

## 2021-06-22 NOTE — PLAN OF CARE
Problem: PAIN - ADULT  Goal: Verbalizes/displays adequate comfort level or baseline comfort level  Description: Interventions:  - Encourage patient to monitor pain and request assistance  - Assess pain using appropriate pain scale  - Administer analgesics based on type and severity of pain and evaluate response  - Implement non-pharmacological measures as appropriate and evaluate response  - Consider cultural and social influences on pain and pain management  - Notify physician/advanced practitioner if interventions unsuccessful or patient reports new pain  Outcome: Progressing     Problem: INFECTION - ADULT  Goal: Absence or prevention of progression during hospitalization  Description: INTERVENTIONS:  - Assess and monitor for signs and symptoms of infection  - Monitor lab/diagnostic results  - Monitor all insertion sites, i e  indwelling lines, tubes, and drains  - Monitor endotracheal if appropriate and nasal secretions for changes in amount and color  - [place] appropriate cooling/warming therapies per order  - Administer medications as ordered  - Instruct and encourage patient and family to use good hand hygiene technique  - Identify and instruct in appropriate isolation precautions for identified infection/condition  Outcome: Progressing  Goal: Absence of fever/infection during neutropenic period  Description: INTERVENTIONS:  - Monitor WBC    Outcome: Progressing     Problem: SAFETY ADULT  Goal: Patient will remain free of falls  Description: INTERVENTIONS:  - Educate patient/family on patient safety including physical limitations  - Instruct patient to call for assistance with activity   - Consult OT/PT to assist with strengthening/mobility   - Keep Call bell within reach  - Keep bed low and locked with side rails adjusted as appropriate  - Keep care items and personal belongings within reach  - Initiate and maintain comfort rounds  - Make Fall Risk Sign visible to staff  - Offer Toileting every  Hours, in advance of need  - Initiate/Maintain alarm  - Obtain necessary fall risk management equipment:   - Apply yellow socks and bracelet for high fall risk patients  - Consider moving patient to room near nurses station  Outcome: Progressing  Goal: Maintain or return to baseline ADL function  Description: INTERVENTIONS:  -  Assess patient's ability to carry out ADLs; assess patient's baseline for ADL function and identify physical deficits which impact ability to perform ADLs (bathing, care of mouth/teeth, toileting, grooming, dressing, etc )  - Assess/evaluate cause of self-care deficits   - Assess range of motion  - Assess patient's mobility; develop plan if impaired  - Assess patient's need for assistive devices and provide as appropriate  - Encourage maximum independence but intervene and supervise when necessary  - Involve family in performance of ADLs  - Assess for home care needs following discharge   - Consider OT consult to assist with ADL evaluation and planning for discharge  - Provide patient education as appropriate  Outcome: Progressing  Goal: Maintains/Returns to pre admission functional level  Description: INTERVENTIONS:  - Perform BMAT or MOVE assessment daily    - Set and communicate daily mobility goal to care team and patient/family/caregiver  - Collaborate with rehabilitation services on mobility goals if consulted  - Perform Range of Motion  times a day  - Reposition patient every  hours    - Dangle patient  times a day  - Stand patient  times a day  - Ambulate patient  times a day  - Out of bed to chair  times a day   - Out of bed for meals  times a day  - Out of bed for toileting  - Record patient progress and toleration of activity level   Outcome: Progressing     Problem: DISCHARGE PLANNING  Goal: Discharge to home or other facility with appropriate resources  Description: INTERVENTIONS:  - Identify barriers to discharge w/patient and caregiver  - Arrange for needed discharge resources and transportation as appropriate  - Identify discharge learning needs (meds, wound care, etc )  - Arrange for interpretive services to assist at discharge as needed  - Refer to Case Management Department for coordinating discharge planning if the patient needs post-hospital services based on physician/advanced practitioner order or complex needs related to functional status, cognitive ability, or social support system  Outcome: Progressing     Problem: POSTPARTUM  Goal: Experiences normal postpartum course  Description: INTERVENTIONS:  - Monitor maternal vital signs  - Assess uterine involution and lochia  Outcome: Progressing  Goal: Appropriate maternal -  bonding  Description: INTERVENTIONS:  - Identify family support  - Assess for appropriate maternal/infant bonding   -Encourage maternal/infant bonding opportunities  - Referral to  or  as needed  Outcome: Progressing  Goal: Establishment of infant feeding pattern  Description: INTERVENTIONS:  - Assess breast/bottle feeding  - Refer to lactation as needed  Outcome: Progressing  Goal: Incision(s), wounds(s) or drain site(s) healing without S/S of infection  Description: INTERVENTIONS  - Assess and document dressing, incision, wound bed, drain sites and surrounding tissue  - Provide patient and family education  - Perform skin care/dressing changes every   Outcome: Progressing     Problem: ALTERATION IN THE BREAST  Goal: Optimize infant feeding at the breast  Description: INTERVENTIONS:  - Latch, breast and nipple assessment  - Assess prior breast feeding history  - Hand expression of breast milk  - For cracked, bleeding and or sore nipples reassess latch, treat damaged nipple  -Educate mother on feeding cues  -Positioning/latch techniques  Outcome: Progressing     Problem: INADEQUATE LATCH, SUCK OR SWALLOW  Goal: Demonstrate ability to latch and sustain latch, audible swallowing and satiety  Description: INTERVENTIONS:  - Assess oral anatomy, notify Physician/AP for abnormal findings  - Establish milk expression  - Maximize feeding opportunity (skin to skin, behavioral state)  - Position/latch techniques  - Discourage use of pacifier-artificial nipple  - Mechanical pumping  - Nipple Shield  - Supplemental formula feeding (Physician/AP order)  - Alternative feeding method  Outcome: Progressing

## 2021-06-22 NOTE — LACTATION NOTE
This note was copied from a baby's chart  Mom being discharged today  Spectra 2 breast pump ordered through Case Management prior to today  Mom states wants to breast and bottle feed using breast milk  Reviewed risks to breastfeeding with early supplementation/bottle  Discharge breastfeeding booklet given & reviewed  Reviewed latch/suck/number of feedings in 24 hours  Reviewed information regarding obtaining a deep latch  Instructed on breast pump use, cleaning of pump, storage and using stored breast milk instructions in breastfeeding discharge booklet  Instructed regarding feeding log, how to know baby is getting enough, and number of feedings/wets/stools needed in 24 hours  Reviewed feeding log with mother and strongly encouraged its use  Mom requests Spectra 2 breast pump for home use, order was placed for breast pump prior to today  Mom instructed to not leave hospital until she receives her breast pump which should be delivered to her room  Pt  Nurse DANI Ruano notified of same  Reviewed prevention of engorgement, when to call outpatient lactation nurse/MD for help  [address] phone number given & information reviewed  Mom has not other questions when asked and repeats information

## 2021-06-22 NOTE — PLAN OF CARE
Problem: PAIN - ADULT  Goal: Verbalizes/displays adequate comfort level or baseline comfort level  Description: Interventions:  - Encourage patient to monitor pain and request assistance  - Assess pain using appropriate pain scale  - Administer analgesics based on type and severity of pain and evaluate response  - Implement non-pharmacological measures as appropriate and evaluate response  - Consider cultural and social influences on pain and pain management  - Notify physician/advanced practitioner if interventions unsuccessful or patient reports new pain  Outcome: Adequate for Discharge     Problem: INFECTION - ADULT  Goal: Absence or prevention of progression during hospitalization  Description: INTERVENTIONS:  - Assess and monitor for signs and symptoms of infection  - Monitor lab/diagnostic results  - Monitor all insertion sites, i e  indwelling lines, tubes, and drains  - Monitor endotracheal if appropriate and nasal secretions for changes in amount and color  - [place] appropriate cooling/warming therapies per order  - Administer medications as ordered  - Instruct and encourage patient and family to use good hand hygiene technique  - Identify and instruct in appropriate isolation precautions for identified infection/condition  Outcome: Adequate for Discharge  Goal: Absence of fever/infection during neutropenic period  Description: INTERVENTIONS:  - Monitor WBC    Outcome: Adequate for Discharge     Problem: SAFETY ADULT  Goal: Patient will remain free of falls  Description: INTERVENTIONS:  - Educate patient/family on patient safety including physical limitations  - Instruct patient to call for assistance with activity   - Consult OT/PT to assist with strengthening/mobility   - Keep Call bell within reach  - Keep bed low and locked with side rails adjusted as appropriate  - Keep care items and personal belongings within reach  - Initiate and maintain comfort rounds  - Make Fall Risk Sign visible to staff  - Offer Toileting every  Hours, in advance of need  - Initiate/Maintain alarm  - Obtain necessary fall risk management equipment:   - Apply yellow socks and bracelet for high fall risk patients  - Consider moving patient to room near nurses station  Outcome: Adequate for Discharge  Goal: Maintain or return to baseline ADL function  Description: INTERVENTIONS:  -  Assess patient's ability to carry out ADLs; assess patient's baseline for ADL function and identify physical deficits which impact ability to perform ADLs (bathing, care of mouth/teeth, toileting, grooming, dressing, etc )  - Assess/evaluate cause of self-care deficits   - Assess range of motion  - Assess patient's mobility; develop plan if impaired  - Assess patient's need for assistive devices and provide as appropriate  - Encourage maximum independence but intervene and supervise when necessary  - Involve family in performance of ADLs  - Assess for home care needs following discharge   - Consider OT consult to assist with ADL evaluation and planning for discharge  - Provide patient education as appropriate  Outcome: Adequate for Discharge  Goal: Maintains/Returns to pre admission functional level  Description: INTERVENTIONS:  - Perform BMAT or MOVE assessment daily    - Set and communicate daily mobility goal to care team and patient/family/caregiver  - Collaborate with rehabilitation services on mobility goals if consulted  - Perform Range of Motion  times a day  - Reposition patient every  hours    - Dangle patient  times a day  - Stand patient  times a day  - Ambulate patient  times a day  - Out of bed to chair  times a day   - Out of bed for meals  times a day  - Out of bed for toileting  - Record patient progress and toleration of activity level   Outcome: Adequate for Discharge     Problem: DISCHARGE PLANNING  Goal: Discharge to home or other facility with appropriate resources  Description: INTERVENTIONS:  - Identify barriers to discharge w/patient and caregiver  - Arrange for needed discharge resources and transportation as appropriate  - Identify discharge learning needs (meds, wound care, etc )  - Arrange for interpretive services to assist at discharge as needed  - Refer to Case Management Department for coordinating discharge planning if the patient needs post-hospital services based on physician/advanced practitioner order or complex needs related to functional status, cognitive ability, or social support system  Outcome: Adequate for Discharge     Problem: POSTPARTUM  Goal: Experiences normal postpartum course  Description: INTERVENTIONS:  - Monitor maternal vital signs  - Assess uterine involution and lochia  Outcome: Adequate for Discharge  Goal: Appropriate maternal -  bonding  Description: INTERVENTIONS:  - Identify family support  - Assess for appropriate maternal/infant bonding   -Encourage maternal/infant bonding opportunities  - Referral to  or  as needed  Outcome: Adequate for Discharge  Goal: Establishment of infant feeding pattern  Description: INTERVENTIONS:  - Assess breast/bottle feeding  - Refer to lactation as needed  Outcome: Adequate for Discharge  Goal: Incision(s), wounds(s) or drain site(s) healing without S/S of infection  Description: INTERVENTIONS  - Assess and document dressing, incision, wound bed, drain sites and surrounding tissue  - Provide patient and family education  - Perform skin care/dressing changes every   Outcome: Adequate for Discharge     Problem: ALTERATION IN THE BREAST  Goal: Optimize infant feeding at the breast  Description: INTERVENTIONS:  - Latch, breast and nipple assessment  - Assess prior breast feeding history  - Hand expression of breast milk  - For cracked, bleeding and or sore nipples reassess latch, treat damaged nipple  -Educate mother on feeding cues  -Positioning/latch techniques  Outcome: Adequate for Discharge     Problem: INADEQUATE LATCH, SUCK OR SWALLOW  Goal: Demonstrate ability to latch and sustain latch, audible swallowing and satiety  Description: INTERVENTIONS:  - Assess oral anatomy, notify Physician/AP for abnormal findings  - Establish milk expression  - Maximize feeding opportunity (skin to skin, behavioral state)  - Position/latch techniques  - Discourage use of pacifier-artificial nipple  - Mechanical pumping  - Nipple Shield  - Supplemental formula feeding (Physician/AP order)  - Alternative feeding method  Outcome: Adequate for Discharge

## 2021-06-22 NOTE — PLAN OF CARE
Problem: PAIN - ADULT  Goal: Verbalizes/displays adequate comfort level or baseline comfort level  Description: Interventions:  - Encourage patient to monitor pain and request assistance  - Assess pain using appropriate pain scale  - Administer analgesics based on type and severity of pain and evaluate response  - Implement non-pharmacological measures as appropriate and evaluate response  - Consider cultural and social influences on pain and pain management  - Notify physician/advanced practitioner if interventions unsuccessful or patient reports new pain  6/22/2021 1612 by Yesi Cervantes RN  Outcome: Completed  6/22/2021 1228 by Yesi Cervantes RN  Outcome: Adequate for Discharge     Problem: INFECTION - ADULT  Goal: Absence or prevention of progression during hospitalization  Description: INTERVENTIONS:  - Assess and monitor for signs and symptoms of infection  - Monitor lab/diagnostic results  - Monitor all insertion sites, i e  indwelling lines, tubes, and drains  - Monitor endotracheal if appropriate and nasal secretions for changes in amount and color  - [place] appropriate cooling/warming therapies per order  - Administer medications as ordered  - Instruct and encourage patient and family to use good hand hygiene technique  - Identify and instruct in appropriate isolation precautions for identified infection/condition  6/22/2021 1612 by Yesi Cervantes RN  Outcome: Completed  6/22/2021 1228 by Yesi Cervantes RN  Outcome: Adequate for Discharge  Goal: Absence of fever/infection during neutropenic period  Description: INTERVENTIONS:  - Monitor WBC    6/22/2021 1612 by Yesi Cervantes RN  Outcome: Completed  6/22/2021 1228 by Yesi Cervantes RN  Outcome: Adequate for Discharge     Problem: SAFETY ADULT  Goal: Patient will remain free of falls  Description: INTERVENTIONS:  - Educate patient/family on patient safety including physical limitations  - Instruct patient to call for assistance with activity   - Consult OT/PT to assist with strengthening/mobility   - Keep Call bell within reach  - Keep bed low and locked with side rails adjusted as appropriate  - Keep care items and personal belongings within reach  - Initiate and maintain comfort rounds  - Make Fall Risk Sign visible to staff  - Offer Toileting every  Hours, in advance of need  - Initiate/Maintain alarm  - Obtain necessary fall risk management equipment:   - Apply yellow socks and bracelet for high fall risk patients  - Consider moving patient to room near nurses station  6/22/2021 1612 by Petrona Lucio RN  Outcome: Completed  6/22/2021 1228 by Petrona Lucio RN  Outcome: Adequate for Discharge  Goal: Maintain or return to baseline ADL function  Description: INTERVENTIONS:  -  Assess patient's ability to carry out ADLs; assess patient's baseline for ADL function and identify physical deficits which impact ability to perform ADLs (bathing, care of mouth/teeth, toileting, grooming, dressing, etc )  - Assess/evaluate cause of self-care deficits   - Assess range of motion  - Assess patient's mobility; develop plan if impaired  - Assess patient's need for assistive devices and provide as appropriate  - Encourage maximum independence but intervene and supervise when necessary  - Involve family in performance of ADLs  - Assess for home care needs following discharge   - Consider OT consult to assist with ADL evaluation and planning for discharge  - Provide patient education as appropriate  6/22/2021 1612 by Petrona Lucio RN  Outcome: Completed  6/22/2021 1228 by Petrona Lucio RN  Outcome: Adequate for Discharge  Goal: Maintains/Returns to pre admission functional level  Description: INTERVENTIONS:  - Perform BMAT or MOVE assessment daily    - Set and communicate daily mobility goal to care team and patient/family/caregiver     - Collaborate with rehabilitation services on mobility goals if consulted  - Perform Range of Motion  times a day   - Reposition patient every  hours    - Dangle patient  times a day  - Stand patient  times a day  - Ambulate patient  times a day  - Out of bed to chair  times a day   - Out of bed for meals  times a day  - Out of bed for toileting  - Record patient progress and toleration of activity level   2021 1612 by Nicki Sharma RN  Outcome: Completed  2021 1228 by Nicki Sharma RN  Outcome: Adequate for Discharge     Problem: DISCHARGE PLANNING  Goal: Discharge to home or other facility with appropriate resources  Description: INTERVENTIONS:  - Identify barriers to discharge w/patient and caregiver  - Arrange for needed discharge resources and transportation as appropriate  - Identify discharge learning needs (meds, wound care, etc )  - Arrange for interpretive services to assist at discharge as needed  - Refer to Case Management Department for coordinating discharge planning if the patient needs post-hospital services based on physician/advanced practitioner order or complex needs related to functional status, cognitive ability, or social support system  2021 1612 by Nicki Sharma RN  Outcome: Completed  2021 1228 by Nicki Sharma RN  Outcome: Adequate for Discharge     Problem: POSTPARTUM  Goal: Experiences normal postpartum course  Description: INTERVENTIONS:  - Monitor maternal vital signs  - Assess uterine involution and lochia  2021 1612 by Nicki Sharma RN  Outcome: Completed  2021 1228 by Nicki Sharma RN  Outcome: Adequate for Discharge  Goal: Appropriate maternal -  bonding  Description: INTERVENTIONS:  - Identify family support  - Assess for appropriate maternal/infant bonding   -Encourage maternal/infant bonding opportunities  - Referral to  or  as needed  2021 1612 by Nicki Sharma RN  Outcome: Completed  2021 1228 by Nicki Sharma RN  Outcome: Adequate for Discharge  Goal: Establishment of infant feeding pattern  Description: INTERVENTIONS:  - Assess breast/bottle feeding  - Refer to lactation as needed  6/22/2021 1612 by Brent Little RN  Outcome: Completed  6/22/2021 1228 by Brent Little RN  Outcome: Adequate for Discharge  Goal: Incision(s), wounds(s) or drain site(s) healing without S/S of infection  Description: INTERVENTIONS  - Assess and document dressing, incision, wound bed, drain sites and surrounding tissue  - Provide patient and family education  - Perform skin care/dressing changes every   6/22/2021 1612 by Brent Little RN  Outcome: Completed  6/22/2021 1228 by Brent Little RN  Outcome: Adequate for Discharge     Problem: ALTERATION IN THE BREAST  Goal: Optimize infant feeding at the breast  Description: INTERVENTIONS:  - Latch, breast and nipple assessment  - Assess prior breast feeding history  - Hand expression of breast milk  - For cracked, bleeding and or sore nipples reassess latch, treat damaged nipple  -Educate mother on feeding cues  -Positioning/latch techniques  6/22/2021 1612 by Brent Little RN  Outcome: Completed  6/22/2021 1228 by Brent Little RN  Outcome: Adequate for Discharge     Problem: INADEQUATE LATCH, SUCK OR SWALLOW  Goal: Demonstrate ability to latch and sustain latch, audible swallowing and satiety  Description: INTERVENTIONS:  - Assess oral anatomy, notify Physician/AP for abnormal findings  - Establish milk expression  - Maximize feeding opportunity (skin to skin, behavioral state)  - Position/latch techniques  - Discourage use of pacifier-artificial nipple  - Mechanical pumping  - Nipple Shield  - Supplemental formula feeding (Physician/AP order)  - Alternative feeding method  6/22/2021 1612 by Brent Little RN  Outcome: Completed  6/22/2021 1228 by Brent Little RN  Outcome: Adequate for Discharge

## 2021-06-22 NOTE — UTILIZATION REVIEW
Inpatient Admission Authorization Request   Notification of Maternity/Delivery &  Birth Information for Admission   SERVICING FACILITY:   [address], [address] [place] [address]  Tax ID: [id number]  NPI: [id number]  Place of Service: Inpatient [facility]  60[facility]  Place of Service Code: 24     ATTENDING PROVIDER:  Attending Name and NPI#: Donna Tobar Md [[id number]]  Address: [address], [address] [place] [address]  Phone: [phone]     UTILIZATION REVIEW CONTACT:  Gabo [facility] Utilization   Network Utilization Review Department  Phone: [phone]  Fax [phone]  Email: [email or web address]     PHYSICIAN ADVISORY SERVICES:  FOR [facility] REVIEW - MEDICAL NECESSITY DENIAL  Phone: [phone]  Fax: [phone]  Email: [email or web address]     TYPE OF REQUEST:  Inpatient Status     ADMISSION INFORMATION:  ADMISSION DATE/TIME: 21  8:29 PM  PATIENT DIAGNOSIS CODE/DESCRIPTION:  40 weeks gestation of pregnancy [Z3A 40]  Encounter for  delivery without indication [O82] The primary encounter diagnosis was S/P primary low transverse   Diagnoses of Pregnancy, obstetrical care, third trimester, Other failed induction of labor, and Category III fetal heart rate tracing during labor and delivery were also pertinent to this visit  1  S/P primary low transverse     2  Pregnancy, obstetrical care, third trimester    3  Other failed induction of labor    4   Category III fetal heart rate tracing during labor and delivery      DISCHARGE DATE/TIME: 2021  3:45 PM  DISCHARGE DISPOSITION (IF DISCHARGED): Home/Self Care     MOTHER AND  INFORMATION:  Mother: Elisabet Booth 1997   Delivering clinician: Jayme   OB History        1    Para   1    Term   1       0    AB   0    Living   1       SAB   0    TAB   0    Ectopic   0    Multiple   0    Live Births   1 Obstetric Comments   G1: refused SVEs, refused pitocin, refused labetalol IV pushes for SRBP, desired general anesthesia            Name & MRN:   Information for the patient's :  Hafsa Ramsay [[id number]]     [place] Delivery Information:  Sex: male  Delivered 2021 11:34 AM by , Low Transverse; Gestational Age: 37w0d    [place] Measurements:  Weight: 6 lb 13 oz (3090 g); Height: 20 08"    APGAR 1 minute 5 minutes 10 minutes   Totals: 1 4 8      Birth Information: 27 y o  female MRN: [id number] Unit/Bed#: -01 Estimated Date of Delivery: 21  Birthweight: No birth weight on file  Gestational Age: <None> Delivery Type: , Low Transverse          APGARS  One minute Five minutes Ten minutes   Totals:               IMPORTANT INFORMATION:  Please contact the [facility] directly with any questions or concerns regarding this request  Department voicemails are confidential     Send requests for admission clinical reviews, concurrent reviews, approvals, and administrative denials due to lack of clinical to fax [phone]

## 2021-07-11 NOTE — PATIENT INSTRUCTIONS
Postpartum Depression   WHAT YOU NEED TO KNOW:   What is postpartum depression? Postpartum depression is a mood disorder that occurs after your baby is born  Your symptoms may last up to 12 months after delivery  Your symptoms may become serious and affect your daily activities and relationships  What are the symptoms of postpartum depression? · Feeling sad, anxious, tearful, discouraged, hopeless, or alone    · Thoughts that you are not a good mother    · Trouble completing daily tasks, concentrating, or remembering things    · Lack of appetite    · Lack interest in your baby    · Feeling restless, irritable, or withdrawn    · An overwhelmed feeling with your new baby and a belief that it will not get better    · Feeling unimportant or guilty most of the time    · Trouble sleeping, even after the baby is asleep    What causes postpartum depression? The exact cause is not known  Hormone levels that increased during pregnancy suddenly drop after your baby is born  This can cause your symptoms  A past episode of postpartum depression or a family history of depression may increase your risk  Postpartum depression may also be trigged by a lack of support at home, stress, or medical problems  How is postpartum depression diagnosed? Healthcare providers will talk to you about how you are feeling and ask if you have any depression  These talks can happen during appointments for your medical care and for your baby's care, such as well child visits   Talk to your providers about the following:  · When you started to feel depressed, and if it is getting worse over time    · Problems you are having with daily activities, sleep, or caring for your baby    · If anything makes your depression worse, or makes you feel better    · Feeling that you are not bonding with your baby the way you want    · Any problems your baby has with sleeping or feeding    · If your baby is fussy or cries a lot    · Support you have from friends, family, or others    How is postpartum depression treated? Treatment may include medicine, talk therapy, or both  · A therapist  can help you find ways to cope with your feelings  This can be done alone or in a group  · Antidepressants  help decrease or stop your symptoms  What can I do to feel better? You may feel better quickly, or if may take a few weeks to feel better  Be patient with yourself  Do the following to take care of yourself:  · Rest as needed  Take a nap or rest while your baby sleeps  Ask someone to watch your baby while you nap  · Get emotional support  Share your feelings with your partner, a friend, or another mother  Ask your partner, friends, or family to help with cooking or cleaning  Do only what is needed and let other things wait until later  · Exercise when you can  Even 5 or 10 minutes of exercise can help improve your mood  Walk around the block or do some stretching exercises  · Eat a variety of healthy foods  Healthy foods include fruits, vegetables, whole-grain breads, low-fat dairy products, beans, lean meats, and fish  Do not skip meals  · Take care of yourself  Shower and dress each day  Write in a journal  Celebrate small achievements, even if it is only 1 thing a day  Try to get out of the house a little each day  Meet a friend for coffee  Call your local emergency number (911 in the [facility]) if:   · You think about hurting yourself or your baby  When should I seek immediate care? · Your feelings of depression or sadness are strong  Call your doctor if:   · Your symptoms last most of the day for days in a row  · Your symptoms last more than 1 week  · You have questions or concerns about your condition or care  CARE AGREEMENT:   You have the right to help plan your care  Learn about your health condition and how it may be treated   Discuss treatment options with your healthcare providers to decide what care you want to receive  You always have the right to refuse treatment  The above information is an  only  It is not intended as medical advice for individual conditions or treatments  Talk to your doctor, nurse or pharmacist before following any medical regimen to see if it is safe and effective for you  © Copyright [employer] 2021 Information is for End User's use only and may not be sold, redistributed or otherwise used for commercial purposes   All illustrations and images included in CareNotes® are the copyrighted property of A D A M , Inc  or [address] [place] [address]

## 2021-07-11 NOTE — PROGRESS NOTES
Diagnoses and all orders for this visit:    1  Postpartum exam/2  S/P primary low transverse   Ok to resume normal activities when 8 weeks pp after C/S  Monitor for PPD s/s, call office or Baby and [address] [place] [address] for lactation assistance  Monitor for s/s of infection  3  Encounter for other general counseling or advice on contraception  Can call when she desires to start POP or Nuva-ring if not breastfeeding  OK to order Nuvaring before her annual in October when stops nursing  All questions and concerns answered  Call with any problems  RTO for annual in October  Pleasant 27 y o  premenopausal female here for postpartum exam  She delivered a baby boy, C/S delivery on 21  She is pumping, but giving more formula to the baby  She denies S/S of mastitis  She denies heavy vaginal bleeding and large clots, reports normal lochia changes  Her [place] PPDS "3"  She reports no difficulty urinating and regular bowel movements  She has not resumed intercourse  Interested in birth control and would like to discuss all options of birth control including Nuva-ring and POP  However, does not want to start any at this time  Reviewed that she would need POP first if still nursing/pumping  Denies F/C/N/V  Past Medical History:   Diagnosis Date    Varicella     got vaccine     Past Surgical History:   Procedure Laterality Date    [place]  DELIVERY ONLY N/A 2021    Procedure:  SECTION ();   Surgeon: Lasha Flores MD;  Location: [facility] ;  Service: Obstetrics     Family History   Problem Relation Age of Onset    No Known Problems Mother     No Known Problems Father     No Known Problems Maternal Grandmother     No Known Problems Maternal Grandfather     Dementia Paternal Grandfather     No Known Problems Half-Sister     No Known Problems Half-Sister     No Known Problems Half-Sister     No Known Problems Half-Sister     No Known Problems Half-Brother     No Known Problems Half-Brother     No Known Problems Half-Brother     Ovarian cancer Neg Hx     Breast cancer Neg Hx     Cervical cancer Neg Hx     Uterine cancer Neg Hx      Social History     Tobacco Use    Smoking status: Never Smoker    Smokeless tobacco: Never Used   Vaping Use    Vaping Use: Never used   Substance Use Topics    Alcohol use: Not Currently    Drug use: Not Currently     Types: Marijuana     Comment: not in past 2 years per patient       Current Outpatient Medications:     acetaminophen (TYLENOL) 325 mg tablet, Take 2 tablets (650 mg total) by mouth every 6 (six) hours, Disp: 30 tablet, Rfl: 0    docusate sodium (COLACE) 100 mg capsule, Take 1 capsule (100 mg total) by mouth 2 (two) times a day, Disp: 20 capsule, Rfl: 0    ferrous sulfate 324 (65 Fe) mg, Take 1 tablet (324 mg total) by mouth 2 (two) times a day before meals, Disp: 30 tablet, Rfl: 0    Prenatal MV-Min-Fe Fum-FA-DHA (PRENATAL 1 PO), Take 1 tablet by mouth, Disp: , Rfl:   Patient Active Problem List    Diagnosis Date Noted    Postpartum exam 2021    Class 3 severe obesity in adult [facility]) 2021    S/P primary low transverse  2021    Preeclampsia, severe 2021    Encounter for supervision of normal first pregnancy in third trimester 2021    Pruritus 2021    Excessive weight gain during pregnancy in third trimester 2021    Obesity affecting pregnancy in third trimester 2021    Pregnancy, obstetrical care, third trimester 2021       No Known Allergies    OB History    Para Term  AB Living   1 1 1 0 0 1   SAB TAB Ectopic Multiple Live Births   0 0 0 0 1      # Outcome Date GA Lbr Satnam/2nd Weight Sex Delivery Anes PTL Lv   1 Term 21 41w0d   M CS-LTranv Spinal, Gen N NGUYỄN      Complications: Failed Induction, Failure to Progress in First Stage, Fetal Intolerance, Refusal of medication      Obstetric Comments   G1: refused SVEs, refused pitocin, refused labetalol IV pushes for SRBP, desired general anesthesia       Vitals:    07/12/21 1411   BP: 110/74   BP Location: Left arm   Patient Position: Sitting   Weight: 114 kg (251 lb 9 6 oz)   Height: 5' 7" (1 702 m)     Body mass index is 39 41 kg/m²  Review of Systems   Constitutional: Negative for chills, fatigue, fever and unexpected weight change  Respiratory: Negative for shortness of breath  Gastrointestinal: Negative for anal bleeding, blood in stool, constipation and diarrhea  Genitourinary: Negative for difficulty urinating, dysuria and hematuria  Physical Exam  Abdominal:      Tenderness: There is no abdominal tenderness  Comments: Well approximated, some keloid starting to generate  Soft, no redness, serous drainage note,  Physical Exam   Constitutional: She appears well-developed and well-nourished  No distress  Head: Normocephalic  Neck: Normal range of motion  Neck supple  Pulmonary: Effort normal   Cardiac: S1 & S2, regular rate & rhythm  Abdominal: Soft  Non-tender, C/S scar Yes  Pelvic exam was deferred   See Diagram

## 2021-07-12 ENCOUNTER — POSTPARTUM VISIT (OUTPATIENT)
Dept: OBGYN CLINIC | Facility: CLINIC | Age: 26
End: 2021-07-12
Payer: COMMERCIAL

## 2021-07-12 VITALS
BODY MASS INDEX: 39.49 KG/M2 | HEIGHT: 67 IN | SYSTOLIC BLOOD PRESSURE: 110 MMHG | WEIGHT: 251.6 LBS | DIASTOLIC BLOOD PRESSURE: 74 MMHG

## 2021-07-12 DIAGNOSIS — Z30.09 ENCOUNTER FOR OTHER GENERAL COUNSELING OR ADVICE ON CONTRACEPTION: ICD-10-CM

## 2021-07-12 DIAGNOSIS — Z98.891 S/P PRIMARY LOW TRANSVERSE C-SECTION: ICD-10-CM

## 2021-07-12 DIAGNOSIS — D50.9 IRON DEFICIENCY ANEMIA, UNSPECIFIED IRON DEFICIENCY ANEMIA TYPE: ICD-10-CM

## 2021-07-12 PROCEDURE — 99213 OFFICE O/P EST LOW 20 MIN: CPT | Performed by: NURSE PRACTITIONER

## 2022-10-14 ENCOUNTER — OFFICE VISIT (OUTPATIENT)
Dept: OBGYN CLINIC | Facility: CLINIC | Age: 27
End: 2022-10-14

## 2022-10-14 VITALS
SYSTOLIC BLOOD PRESSURE: 126 MMHG | BODY MASS INDEX: 36.26 KG/M2 | WEIGHT: 231 LBS | DIASTOLIC BLOOD PRESSURE: 86 MMHG | HEIGHT: 67 IN

## 2022-10-14 DIAGNOSIS — N89.8 VAGINAL DISCHARGE: Primary | ICD-10-CM

## 2022-10-14 PROBLEM — Z34.93 PREGNANCY, OBSTETRICAL CARE, THIRD TRIMESTER: Status: RESOLVED | Noted: 2021-04-26 | Resolved: 2022-10-14

## 2022-10-14 PROBLEM — Z98.891 S/P PRIMARY LOW TRANSVERSE C-SECTION: Status: RESOLVED | Noted: 2021-06-19 | Resolved: 2022-10-14

## 2022-10-14 PROBLEM — O14.10 PREECLAMPSIA, SEVERE: Status: RESOLVED | Noted: 2021-06-19 | Resolved: 2022-10-14

## 2022-10-14 PROBLEM — O26.03 EXCESSIVE WEIGHT GAIN DURING PREGNANCY IN THIRD TRIMESTER: Status: RESOLVED | Noted: 2021-05-17 | Resolved: 2022-10-14

## 2022-10-14 PROBLEM — O99.213 OBESITY AFFECTING PREGNANCY IN THIRD TRIMESTER: Status: RESOLVED | Noted: 2021-05-17 | Resolved: 2022-10-14

## 2022-10-14 PROBLEM — Z34.03 ENCOUNTER FOR SUPERVISION OF NORMAL FIRST PREGNANCY IN THIRD TRIMESTER: Status: RESOLVED | Noted: 2021-05-25 | Resolved: 2022-10-14

## 2022-10-14 PROBLEM — L73.2 HIDRADENITIS SUPPURATIVA: Status: ACTIVE | Noted: 2022-10-14

## 2022-10-14 LAB
BV WHIFF TEST VAG QL: NEGATIVE
CLUE CELLS SPEC QL WET PREP: NEGATIVE
PH SMN: 4 [PH]
T VAGINALIS VAG QL WET PREP: NEGATIVE
YEAST VAG QL WET PREP: NEGATIVE

## 2022-10-14 NOTE — PATIENT INSTRUCTIONS
Hidradenitis Suppurativa   AMBULATORY CARE:   Hidradenitis suppurativa  (HS) is a chronic (long-term) skin disease that causes your sweat glands or hair follicles to get clogged and inflamed  HS causes red bumps that look like pimples or small boils to develop on your skin  The cause of HS is unknown  It may run in families  Being overweight and smoking worsens signs and symptoms of HS  Signs and symptoms of HS:  HS usually occurs in areas around skin folds or where sweat glands or hair follicles are  This includes the armpits, groin, genital or anal area, and under breasts in women  Your signs and symptoms may come and go  They may also get worse over time  You may have any of the following:  Mild or early signs of HS:  One or more tender, red bumps that look like pimples or boils    Worsening signs and symptoms of HS:      Painful, hard bumps that get larger, break open, and drain pus that smells bad    Bumps that form deep under your skin and connect to each other and form a tunnel     Deep, thick scars caused by bumps that heal and reappear over time in the same area    Contact your healthcare provider if:   Your symptoms get worse, even with treatment  You have questions or concerns about your condition or care  Treatment:  The goal of treatment is to control symptoms, prevent the development of new bumps, and limit scarring  You may need any of the following:  Antibiotics  are used to treat or prevent a bacterial infection  Antibiotics may be used long-term  Antibiotics may be given as a cream or pill  NSAIDs , such as ibuprofen, help decrease swelling, pain, and fever  This medicine is available with or without a doctor's order  NSAIDs can cause stomach bleeding or kidney problems in certain people  If you take blood thinner medicine, always ask your healthcare provider if NSAIDs are safe for you  Always read the medicine label and follow directions  Acetaminophen  decreases pain and fever   It is available without a doctor's order  Ask how much to take and how often to take it  Follow directions  Acetaminophen can cause liver damage if not taken correctly  Other medicines  may be used to treat HS  These may include hormones, acne medicines, steroids, biologic therapy, and medicines that slow your immune system  Incision and drainage  is a procedure to drain pus from an HS wound and clean it out so it can heal     Surgery  may be needed if medicines do not work  Surgery may be done to remove areas of skin affected by HS  Manage HS symptoms and decrease flare-ups:   Apply warm, moist compresses  This may help to decrease pain  Keep the compress on your skin for 10 minutes  Sitz baths may be recommended if your genital or anal area is affected by HS  To do a sitz bath, fill a bathtub with 4 to 6 inches of warm water  You may also use a sitz bath pan that fits inside a toilet bowl  Sit in the sitz bath for 15 minutes  Do this 3 times a day, and after each bowel movement  The warm water can help decrease pain and swelling  Wash your skin gently  Use cleansers recommended by your healthcare provider  Antibacterial soap may be helpful  Lose weight if you are overweight  Weight loss may help to improve signs and symptoms of HS  Do not smoke  Smoking can make it more difficult to treat HS and worsen symptoms  Ask your healthcare provider for information if you currently smoke and need help to quit  E-cigarettes or smokeless tobacco still contain nicotine  Talk to your healthcare provider before you use these products  Do not wear tight clothing  Tight clothing rubs against your skin and causes irritation that can worsen HS  Do not shave or use deodorant in areas of skin affected by HS  Ask your healthcare provider about safe deodorant or hair removal options  Keep your skin cool  Overheating and sweating can cause an HS flare-up       Ask your healthcare provider if you should make any changes to the foods you eat  Your healthcare provider may recommend that you avoid dairy foods  A dairy-free diet may help decrease your symptoms  Dairy foods include milk, cheese, yogurt, and ice cream      Tell your healthcare provider if HS is causing you to feel depressed  Your healthcare provider may recommend counseling to help you cope with HS  Follow up with your doctor as directed:  Write down your questions so you remember to ask them during your visits  © Copyright [employer] 2022 Information is for End User's use only and may not be sold, redistributed or otherwise used for commercial purposes  All illustrations and images included in CareNotes® are the copyrighted property of [employer]  or [facility] Dorene Burt   The above information is an  only  It is not intended as medical advice for individual conditions or treatments  Talk to your doctor, nurse or pharmacist before following any medical regimen to see if it is safe and effective for you

## 2022-10-14 NOTE — PROGRESS NOTES
Assessment/Plan:    No problem-specific Assessment & Plan notes found for this encounter  Diagnoses and all orders for this visit:    Vaginal discharge  -     POCT wet mount  Reassured patient that exam was benign  Offered referral to dermatology for hidradenitis suppurativa- patient declined  States she has been to see dermatologist for this condition in the past, they recommended surgery which she declined  Reviewed good perineal hygiene  Return for annual     Subjective:      Patient ID: Alia Puente is a 24 y o  female here for evaluation of her vulva  She states she will often examine her vulva and groin area due to her history of hidradenitis suppurativa  While examining her vulva she noticed her urethra appeared swollen  She denies any problems urinating or UTI like symptoms  She denies any vulvar lesions or irritation  Denies any new sexual partners or concerns for STDs  She does report a creamy white vaginal discharge  Denies vaginal itching, irritation or odor  Denies pelvic pain or dyspareunia  The following portions of the patient's history were reviewed and updated as appropriate:   She  has a past medical history of Varicella  She   Patient Active Problem List    Diagnosis Date Noted   • Hidradenitis suppurativa 10/14/2022   • Class 3 severe obesity in adult [facility]) 2021   • Pruritus 2021     She  has a past surgical history that includes pr  delivery only (N/A, 2021)  Her family history includes Dementia in her paternal grandfather; No Known Problems in her father, half-brother, half-brother, half-brother, half-sister, half-sister, half-sister, half-sister, maternal grandfather, maternal grandmother, and mother  She  reports that she has never smoked  She has never used smokeless tobacco  She reports previous alcohol use  She reports previous drug use  Drug: Marijuana    Current Outpatient Medications   Medication Sig Dispense Refill   • acetaminophen (TYLENOL) 325 mg tablet Take 2 tablets (650 mg total) by mouth every 6 (six) hours (Patient not taking: Reported on 10/14/2022) 30 tablet 0   • docusate sodium (COLACE) 100 mg capsule Take 1 capsule (100 mg total) by mouth 2 (two) times a day (Patient not taking: Reported on 10/14/2022) 20 capsule 0   • ferrous sulfate 324 (65 Fe) mg Take 1 tablet (324 mg total) by mouth 2 (two) times a day before meals (Patient not taking: Reported on 10/14/2022) 30 tablet 0   • Prenatal MV-Min-Fe Fum-FA-DHA (PRENATAL 1 PO) Take 1 tablet by mouth (Patient not taking: Reported on 10/14/2022)       No current facility-administered medications for this visit  She has No Known Allergies       Review of Systems   Constitutional: Negative for chills and fever  Gastrointestinal: Negative for abdominal pain, constipation, diarrhea, nausea and vomiting  Genitourinary: Negative for decreased urine volume, difficulty urinating, dyspareunia, dysuria, enuresis, flank pain, frequency, genital sores, hematuria, pelvic pain, urgency, vaginal bleeding, vaginal discharge and vaginal pain  Musculoskeletal: Negative for back pain and myalgias  Skin: Negative for pallor and rash  Hematological: Negative  Psychiatric/Behavioral: Negative  Objective:      /86 (BP Location: Left arm, Patient Position: Sitting, Cuff Size: Large)   Ht 5' 7" (1 702 m)   Wt 105 kg (231 lb)   LMP 09/19/2022 (Exact Date)   BMI 36 18 kg/m²          Physical Exam  Vitals and nursing note reviewed  Constitutional:       General: She is not in acute distress  Appearance: Normal appearance  She is not ill-appearing  HENT:      Head: Normocephalic and atraumatic  Eyes:      Conjunctiva/sclera: Conjunctivae normal    Pulmonary:      Effort: Pulmonary effort is normal    Abdominal:      Palpations: Abdomen is soft  Tenderness: There is no abdominal tenderness  Genitourinary:     General: Normal vulva  Exam position: Lithotomy position  Labia:         Right: No rash, tenderness, lesion or injury  Left: No rash, tenderness, lesion or injury  Urethra: No prolapse, urethral pain, urethral swelling or urethral lesion  Vagina: No signs of injury and foreign body  No vaginal discharge, erythema, tenderness, bleeding, lesions or prolapsed vaginal walls  Cervix: No cervical motion tenderness, discharge, friability, lesion, erythema, cervical bleeding or eversion  Comments: Multiple areas of indurated scar tissue scattered around inner groin and pubis mons  Consistent with chronic, severe HS  Musculoskeletal:         General: Normal range of motion  Cervical back: Neck supple  Lymphadenopathy:      Lower Body: No right inguinal adenopathy  No left inguinal adenopathy  Skin:     General: Skin is warm and dry  Neurological:      General: No focal deficit present  Mental Status: She is alert     Psychiatric:         Mood and Affect: Mood normal          Behavior: Behavior normal          Results for orders placed or performed in visit on 10/14/22   POCT wet mount   Result Value Ref Range    Yeast, Wet Prep Negative     pH 4 0     Whiff Test Negative     Clue Cells Negative     Trich, Wet Prep Negative

## 2023-08-07 ENCOUNTER — OFFICE VISIT (OUTPATIENT)
Dept: FAMILY MEDICINE CLINIC | Facility: CLINIC | Age: 28
End: 2023-08-07
Payer: COMMERCIAL

## 2023-08-07 VITALS
BODY MASS INDEX: 34.87 KG/M2 | TEMPERATURE: 98.3 F | WEIGHT: 217 LBS | HEIGHT: 66 IN | HEART RATE: 105 BPM | OXYGEN SATURATION: 97 % | DIASTOLIC BLOOD PRESSURE: 80 MMHG | SYSTOLIC BLOOD PRESSURE: 118 MMHG

## 2023-08-07 DIAGNOSIS — L73.2 HIDRADENITIS SUPPURATIVA: ICD-10-CM

## 2023-08-07 DIAGNOSIS — Z00.00 ANNUAL PHYSICAL EXAM: Primary | ICD-10-CM

## 2023-08-07 DIAGNOSIS — Z11.3 SCREEN FOR STD (SEXUALLY TRANSMITTED DISEASE): ICD-10-CM

## 2023-08-07 DIAGNOSIS — D72.819 LEUKOPENIA, UNSPECIFIED TYPE: ICD-10-CM

## 2023-08-07 DIAGNOSIS — K59.01 SLOW TRANSIT CONSTIPATION: ICD-10-CM

## 2023-08-07 DIAGNOSIS — J02.9 EXUDATIVE PHARYNGITIS: ICD-10-CM

## 2023-08-07 DIAGNOSIS — R73.01 ELEVATED FASTING GLUCOSE: ICD-10-CM

## 2023-08-07 DIAGNOSIS — R30.0 DYSURIA: ICD-10-CM

## 2023-08-07 PROCEDURE — 99203 OFFICE O/P NEW LOW 30 MIN: CPT | Performed by: NURSE PRACTITIONER

## 2023-08-07 PROCEDURE — 99395 PREV VISIT EST AGE 18-39: CPT | Performed by: NURSE PRACTITIONER

## 2023-08-07 PROCEDURE — 99173 VISUAL ACUITY SCREEN: CPT | Performed by: NURSE PRACTITIONER

## 2023-08-07 RX ORDER — FLUCONAZOLE 150 MG/1
150 TABLET ORAL ONCE
Qty: 1 TABLET | Refills: 0 | Status: SHIPPED | OUTPATIENT
Start: 2023-08-07 | End: 2023-08-07

## 2023-08-07 RX ORDER — CEPHALEXIN 500 MG/1
500 CAPSULE ORAL EVERY 12 HOURS SCHEDULED
Qty: 10 CAPSULE | Refills: 0 | Status: SHIPPED | OUTPATIENT
Start: 2023-08-07 | End: 2023-08-12

## 2023-08-07 RX ORDER — POLYETHYLENE GLYCOL 3350 17 G/17G
17 POWDER, FOR SOLUTION ORAL DAILY
Qty: 510 G | Refills: 1 | Status: SHIPPED | OUTPATIENT
Start: 2023-08-07 | End: 2023-09-06

## 2023-08-07 NOTE — PROGRESS NOTES
BMI Counseling: Body mass index is 35.02 kg/m². The BMI is above normal. Nutrition recommendations include decreasing portion sizes, encouraging healthy choices of fruits and vegetables, decreasing fast food intake, consuming healthier snacks, limiting drinks that contain sugar, moderation in carbohydrate intake, increasing intake of lean protein, reducing intake of saturated and trans fat and reducing intake of cholesterol. Exercise recommendations include vigorous physical activity 75 minutes/week, exercising 3-5 times per week and strength training exercises. No pharmacotherapy was ordered. Patient referred to PCP. Rationale for BMI follow-up plan is due to patient being overweight or obese. Depression Screening and Follow-up Plan: Patient was screened for depression during today's encounter. They screened negative with a PHQ-2 score of 1. Assessment/Plan:    Patient is a 34 yr old female   presents in office to establish care underlying history of elevated fasting glucose and family history of diabetes. Would like some blood work ordered to get evaluated for diabetes and just baseline evaluation and a physical.   Has a 3year-old at home has lost a lot of weight since prior to pregnancy used to have issues with hidradenitis suppurativa she is well much better with that, however had a lot of scar tissue on both of her armpits and abdomen but she does form keloids upon healing. Currently has some sore throat dysuria she is concerned with possible STD exposure so she would like to get tested for that. Constipation - discussed supportive measures. Pharyngitis is tender she has swollen lymph nodes she has a lot of exudate currently I have started her on Keflex for both the pharyngitis and the urinary tract infection. She also get yeast infections so I have added Diflucan to be done after she is done with the antibiotics.    Follow-up in 4 to 6 weeks to review labs and see how she is feeling  Discussed low sugars diet low-fat low-cholesterol decrease carbs increase activity as tolerated contact her as a result of coming in.    Supportive measures discussed salt water gargles will help Tylenol Motrin as needed     Problem List Items Addressed This Visit        Musculoskeletal and Integument    Hidradenitis suppurativa    Relevant Medications    cephalexin (KEFLEX) 500 mg capsule    Other Relevant Orders    Comprehensive metabolic panel    CBC and differential    Iron Panel (Includes Ferritin, Iron Sat%, Iron, and TIBC)    TSH, 3rd generation with Free T4 reflex    HEMOGLOBIN A1C W/ EAG ESTIMATION    Insulin, fasting    UA w Reflex to Microscopic w Reflex to Culture -Lab Collect    Lipid panel   Other Visit Diagnoses     Annual physical exam    -  Primary    Relevant Orders    Comprehensive metabolic panel    CBC and differential    Iron Panel (Includes Ferritin, Iron Sat%, Iron, and TIBC)    TSH, 3rd generation with Free T4 reflex    HEMOGLOBIN A1C W/ EAG ESTIMATION    Insulin, fasting    UA w Reflex to Microscopic w Reflex to Culture -Lab Collect    Lipid panel    BMI 35.0-35.9,adult        Relevant Orders    Comprehensive metabolic panel    CBC and differential    Iron Panel (Includes Ferritin, Iron Sat%, Iron, and TIBC)    TSH, 3rd generation with Free T4 reflex    HEMOGLOBIN A1C W/ EAG ESTIMATION    Insulin, fasting    UA w Reflex to Microscopic w Reflex to Culture -Lab Collect    Lipid panel    Elevated fasting glucose        Relevant Orders    Comprehensive metabolic panel    CBC and differential    Iron Panel (Includes Ferritin, Iron Sat%, Iron, and TIBC)    TSH, 3rd generation with Free T4 reflex    HEMOGLOBIN A1C W/ EAG ESTIMATION    Insulin, fasting    UA w Reflex to Microscopic w Reflex to Culture -Lab Collect    Lipid panel    Screen for STD (sexually transmitted disease)        Relevant Orders    : HIV 1/2 AB/AG w Reflex [facility] for 2 yr old and above    Hepatitis C antibody    RPR-Syphilis Screening (Total Syphilis IGG/IGM)    Chlamydia/GC amplified DNA by PCR    Slow transit constipation        Relevant Medications    polyethylene glycol (GLYCOLAX) 17 GM/SCOOP powder    Exudative pharyngitis        Relevant Medications    cephalexin (KEFLEX) 500 mg capsule    fluconazole (DIFLUCAN) 150 mg tablet    Other Relevant Orders    Throat culture    Cytology, urine    Dysuria        Relevant Orders    Cytology, urine          Vision Screening    Right eye Left eye Both eyes   Without correction 20/25 20/20 20/20   With correction        Subjective:      Patient ID: Mary Campos is a 34 y.o. female. Patient is a 34 yr old female   presents in office to establish care underlying history of elevated fasting glucose and family history of diabetes. Would like some blood work ordered to get evaluated for diabetes and just baseline evaluation and a physical.   Has a 3year-old at home has lost a lot of weight since prior to pregnancy used to have issues with hidradenitis suppurativa she is well much better with that, however had a lot of scar tissue on both of her armpits and abdomen but she does form keloids upon healing. Currently has some sore throat dysuria she is concerned with possible STD exposure so she would like to get tested for that. Constipation - discussed supportive measures. Pharyngitis is tender she has swollen lymph nodes she has a lot of exudate currently I have started her on Keflex for both the pharyngitis and the urinary tract infection. She also get yeast infections so I have added Diflucan to be done after she is done with the antibiotics. Follow-up in 4 to 6 weeks to review labs and see how she is feeling  Discussed low sugars diet low-fat low-cholesterol decrease carbs increase activity as tolerated contact her as a result of coming in.    Supportive measures discussed salt water gargles will help Tylenol Motrin as needed           The following portions of the patient's history were reviewed and updated as appropriate:   Past Medical History:  She has a past medical history of Varicella. ,  _______________________________________________________________________  Medical Problems:  does not have any pertinent problems on file.,  _______________________________________________________________________  Past Surgical History:   has a past surgical history that includes pr  delivery only (N/A, 2021). ,  _______________________________________________________________________  Family History:  family history includes Dementia in her paternal grandfather; No Known Problems in her father, half-brother, half-brother, half-brother, half-sister, half-sister, half-sister, half-sister, maternal grandfather, maternal grandmother, and mother.,  _______________________________________________________________________  Social History:   reports that she has never smoked. She has never used smokeless tobacco. She reports that she does not currently use alcohol. She reports that she does not currently use drugs after having used the following drugs: Marijuana. ,  _______________________________________________________________________  Allergies:  is allergic to penicillins. .  _______________________________________________________________________  Current Outpatient Medications   Medication Sig Dispense Refill   • cephalexin (KEFLEX) 500 mg capsule Take 1 capsule (500 mg total) by mouth every 12 (twelve) hours for 5 days 10 capsule 0   • fluconazole (DIFLUCAN) 150 mg tablet Take 1 tablet (150 mg total) by mouth once for 1 dose 1 tablet 0   • polyethylene glycol (GLYCOLAX) 17 GM/SCOOP powder Take 17 g by mouth daily 510 g 1     No current facility-administered medications for this visit.     _______________________________________________________________________  Review of Systems   Constitutional: Positive for chills and fatigue. Negative for fever and unexpected weight change.    HENT: Positive for congestion, postnasal drip, sore throat and trouble swallowing. Eyes: Negative. Respiratory: Negative for cough and shortness of breath. Cardiovascular: Negative for chest pain and palpitations. Gastrointestinal: Positive for constipation. Negative for abdominal distention, abdominal pain, nausea and vomiting. Endocrine:        History of elevated fasting glucose    Genitourinary: Positive for difficulty urinating, dysuria and urgency. Possible STD exposure    Musculoskeletal: Positive for arthralgias and myalgias. Skin: Negative for rash. History of hydrantis  Supportive  Scar tissues noted to both armpits     Allergic/Immunologic: Positive for environmental allergies. Neurological: Positive for headaches. Hematological: Positive for adenopathy. Psychiatric/Behavioral: Negative for sleep disturbance and suicidal ideas. The patient is not nervous/anxious. Objective:  Vitals:    08/07/23 1402   BP: 118/80   BP Location: Left arm   Patient Position: Sitting   Cuff Size: Adult   Pulse: 105   Temp: 98.3 °F (36.8 °C)   SpO2: 97%   Weight: 98.4 kg (217 lb)   Height: 5' 6" (1.676 m)     Body mass index is 35.02 kg/m². Physical Exam  Vitals and nursing note reviewed. Constitutional:       Appearance: She is ill-appearing. Comments: BMI 35.02   HENT:      Head: Atraumatic. Right Ear: Tympanic membrane normal.      Left Ear: Tympanic membrane normal.      Nose: Rhinorrhea present. Mouth/Throat:      Pharynx: Oropharyngeal exudate and posterior oropharyngeal erythema present. Cardiovascular:      Rate and Rhythm: Normal rate and regular rhythm. Pulses: Normal pulses. Heart sounds: Normal heart sounds. Pulmonary:      Effort: Pulmonary effort is normal.      Breath sounds: Normal breath sounds. Abdominal:      Palpations: Abdomen is soft. Musculoskeletal:      Cervical back: Normal range of motion. Right lower leg: No edema.       Left lower leg: No edema. Lymphadenopathy:      Cervical: Cervical adenopathy present. Skin:     Capillary Refill: Capillary refill takes less than 2 seconds. Neurological:      Mental Status: She is alert and oriented to person, place, and time.    Psychiatric:         Mood and Affect: Mood normal.         Behavior: Behavior normal.

## 2023-08-08 ENCOUNTER — LAB (OUTPATIENT)
Dept: LAB | Facility: CLINIC | Age: 28
End: 2023-08-08
Payer: COMMERCIAL

## 2023-08-08 DIAGNOSIS — R73.01 ELEVATED FASTING GLUCOSE: ICD-10-CM

## 2023-08-08 DIAGNOSIS — Z11.3 SCREEN FOR STD (SEXUALLY TRANSMITTED DISEASE): ICD-10-CM

## 2023-08-08 DIAGNOSIS — L73.2 HIDRADENITIS SUPPURATIVA: ICD-10-CM

## 2023-08-08 DIAGNOSIS — R30.0 DYSURIA: ICD-10-CM

## 2023-08-08 DIAGNOSIS — Z00.00 ANNUAL PHYSICAL EXAM: ICD-10-CM

## 2023-08-08 DIAGNOSIS — J02.9 EXUDATIVE PHARYNGITIS: ICD-10-CM

## 2023-08-08 LAB
ALBUMIN SERPL BCP-MCNC: 3.7 G/DL (ref 3.5–5)
ALP SERPL-CCNC: 67 U/L (ref 46–116)
ALT SERPL W P-5'-P-CCNC: 21 U/L (ref 12–78)
ANION GAP SERPL CALCULATED.3IONS-SCNC: 5 MMOL/L
AST SERPL W P-5'-P-CCNC: 24 U/L (ref 5–45)
BACTERIA UR QL AUTO: ABNORMAL /HPF
BASOPHILS # BLD AUTO: 0.05 THOUSANDS/ÂΜL (ref 0–0.1)
BASOPHILS NFR BLD AUTO: 1 % (ref 0–1)
BILIRUB SERPL-MCNC: 0.47 MG/DL (ref 0.2–1)
BILIRUB UR QL STRIP: NEGATIVE
BUN SERPL-MCNC: 10 MG/DL (ref 5–25)
CALCIUM SERPL-MCNC: 9.4 MG/DL (ref 8.3–10.1)
CHLORIDE SERPL-SCNC: 106 MMOL/L (ref 96–108)
CHOLEST SERPL-MCNC: 142 MG/DL
CLARITY UR: CLEAR
CO2 SERPL-SCNC: 26 MMOL/L (ref 21–32)
COLOR UR: YELLOW
CREAT SERPL-MCNC: 0.82 MG/DL (ref 0.6–1.3)
EOSINOPHIL # BLD AUTO: 0.13 THOUSAND/ÂΜL (ref 0–0.61)
EOSINOPHIL NFR BLD AUTO: 3 % (ref 0–6)
ERYTHROCYTE [DISTWIDTH] IN BLOOD BY AUTOMATED COUNT: 13.6 % (ref 11.6–15.1)
EST. AVERAGE GLUCOSE BLD GHB EST-MCNC: 103 MG/DL
GFR SERPL CREATININE-BSD FRML MDRD: 99 ML/MIN/1.73SQ M
GLUCOSE SERPL-MCNC: 75 MG/DL (ref 65–140)
GLUCOSE UR STRIP-MCNC: NEGATIVE MG/DL
HBA1C MFR BLD: 5.2 %
HCT VFR BLD AUTO: 42.1 % (ref 34.8–46.1)
HDLC SERPL-MCNC: 62 MG/DL
HGB BLD-MCNC: 13.1 G/DL (ref 11.5–15.4)
HGB UR QL STRIP.AUTO: NEGATIVE
IMM GRANULOCYTES # BLD AUTO: 0.01 THOUSAND/UL (ref 0–0.2)
IMM GRANULOCYTES NFR BLD AUTO: 0 % (ref 0–2)
IRON SATN MFR SERPL: 23 % (ref 15–50)
IRON SERPL-MCNC: 77 UG/DL (ref 50–170)
KETONES UR STRIP-MCNC: ABNORMAL MG/DL
LDLC SERPL CALC-MCNC: 69 MG/DL (ref 0–100)
LEUKOCYTE ESTERASE UR QL STRIP: NEGATIVE
LYMPHOCYTES # BLD AUTO: 1.35 THOUSANDS/ÂΜL (ref 0.6–4.47)
LYMPHOCYTES NFR BLD AUTO: 35 % (ref 14–44)
MCH RBC QN AUTO: 27.1 PG (ref 26.8–34.3)
MCHC RBC AUTO-ENTMCNC: 31.1 G/DL (ref 31.4–37.4)
MCV RBC AUTO: 87 FL (ref 82–98)
MONOCYTES # BLD AUTO: 0.39 THOUSAND/ÂΜL (ref 0.17–1.22)
MONOCYTES NFR BLD AUTO: 10 % (ref 4–12)
NEUTROPHILS # BLD AUTO: 1.97 THOUSANDS/ÂΜL (ref 1.85–7.62)
NEUTS SEG NFR BLD AUTO: 51 % (ref 43–75)
NITRITE UR QL STRIP: NEGATIVE
NON-SQ EPI CELLS URNS QL MICRO: ABNORMAL /HPF
NONHDLC SERPL-MCNC: 80 MG/DL
NRBC BLD AUTO-RTO: 0 /100 WBCS
PH UR STRIP.AUTO: 7 [PH]
PLATELET # BLD AUTO: 238 THOUSANDS/UL (ref 149–390)
PMV BLD AUTO: 11.7 FL (ref 8.9–12.7)
POTASSIUM SERPL-SCNC: 3.8 MMOL/L (ref 3.5–5.3)
PROT SERPL-MCNC: 8.2 G/DL (ref 6.4–8.4)
PROT UR STRIP-MCNC: ABNORMAL MG/DL
RBC # BLD AUTO: 4.83 MILLION/UL (ref 3.81–5.12)
RBC #/AREA URNS AUTO: ABNORMAL /HPF
SODIUM SERPL-SCNC: 137 MMOL/L (ref 135–147)
SP GR UR STRIP.AUTO: 1.01 (ref 1–1.03)
TIBC SERPL-MCNC: 341 UG/DL (ref 250–450)
TRIGL SERPL-MCNC: 54 MG/DL
UROBILINOGEN UR STRIP-ACNC: <2 MG/DL
WBC # BLD AUTO: 3.9 THOUSAND/UL (ref 4.31–10.16)
WBC #/AREA URNS AUTO: ABNORMAL /HPF

## 2023-08-08 PROCEDURE — 83540 ASSAY OF IRON: CPT

## 2023-08-08 PROCEDURE — 87591 N.GONORRHOEAE DNA AMP PROB: CPT

## 2023-08-08 PROCEDURE — 83525 ASSAY OF INSULIN: CPT

## 2023-08-08 PROCEDURE — 87389 HIV-1 AG W/HIV-1&-2 AB AG IA: CPT

## 2023-08-08 PROCEDURE — 84443 ASSAY THYROID STIM HORMONE: CPT

## 2023-08-08 PROCEDURE — 83036 HEMOGLOBIN GLYCOSYLATED A1C: CPT

## 2023-08-08 PROCEDURE — 36415 COLL VENOUS BLD VENIPUNCTURE: CPT

## 2023-08-08 PROCEDURE — 88112 CYTOPATH CELL ENHANCE TECH: CPT | Performed by: PATHOLOGY

## 2023-08-08 PROCEDURE — 80061 LIPID PANEL: CPT

## 2023-08-08 PROCEDURE — 81001 URINALYSIS AUTO W/SCOPE: CPT

## 2023-08-08 PROCEDURE — 80053 COMPREHEN METABOLIC PANEL: CPT

## 2023-08-08 PROCEDURE — 83550 IRON BINDING TEST: CPT

## 2023-08-08 PROCEDURE — 85025 COMPLETE CBC W/AUTO DIFF WBC: CPT

## 2023-08-08 PROCEDURE — 87491 CHLMYD TRACH DNA AMP PROBE: CPT

## 2023-08-08 PROCEDURE — 82728 ASSAY OF FERRITIN: CPT

## 2023-08-08 PROCEDURE — 86803 HEPATITIS C AB TEST: CPT

## 2023-08-08 PROCEDURE — 86780 TREPONEMA PALLIDUM: CPT

## 2023-08-09 LAB
C TRACH DNA SPEC QL NAA+PROBE: NEGATIVE
FERRITIN SERPL-MCNC: 40 NG/ML (ref 11–307)
HCV AB SER QL: NORMAL
HIV 1+2 AB+HIV1 P24 AG SERPL QL IA: NORMAL
HIV 2 AB SERPL QL IA: NORMAL
HIV1 AB SERPL QL IA: NORMAL
HIV1 P24 AG SERPL QL IA: NORMAL
INSULIN SERPL-ACNC: 4.81 UIU/ML (ref 1.9–23)
N GONORRHOEA DNA SPEC QL NAA+PROBE: NEGATIVE
TREPONEMA PALLIDUM IGG+IGM AB [PRESENCE] IN SERUM OR PLASMA BY IMMUNOASSAY: NORMAL
TSH SERPL DL<=0.05 MIU/L-ACNC: 0.82 UIU/ML (ref 0.45–4.5)

## 2023-08-09 NOTE — RESULT ENCOUNTER NOTE
Hydrate better increase protein   Went from high WBC in the past to low now but she has not been feeling well so we will repeat blood counts again before she comes back on 9/11  Hiv negative   Hep c negative   Chlamydia and gonorrhea negative   Syph negative   Rest of the labs all look good   So repeat blood counts 1 week prior to her follow up visit in [facility]

## 2023-08-10 PROCEDURE — 88112 CYTOPATH CELL ENHANCE TECH: CPT | Performed by: PATHOLOGY

## 2023-09-08 ENCOUNTER — LAB (OUTPATIENT)
Dept: LAB | Facility: HOSPITAL | Age: 28
End: 2023-09-08
Payer: COMMERCIAL

## 2023-09-08 DIAGNOSIS — D72.819 LEUKOPENIA, UNSPECIFIED TYPE: ICD-10-CM

## 2023-09-08 LAB
BASOPHILS # BLD AUTO: 0.04 THOUSANDS/ÂΜL (ref 0–0.1)
BASOPHILS NFR BLD AUTO: 1 % (ref 0–1)
EOSINOPHIL # BLD AUTO: 0.07 THOUSAND/ÂΜL (ref 0–0.61)
EOSINOPHIL NFR BLD AUTO: 1 % (ref 0–6)
ERYTHROCYTE [DISTWIDTH] IN BLOOD BY AUTOMATED COUNT: 13.7 % (ref 11.6–15.1)
HCT VFR BLD AUTO: 43.4 % (ref 34.8–46.1)
HGB BLD-MCNC: 14.4 G/DL (ref 11.5–15.4)
IMM GRANULOCYTES # BLD AUTO: 0.01 THOUSAND/UL (ref 0–0.2)
IMM GRANULOCYTES NFR BLD AUTO: 0 % (ref 0–2)
LYMPHOCYTES # BLD AUTO: 2.06 THOUSANDS/ÂΜL (ref 0.6–4.47)
LYMPHOCYTES NFR BLD AUTO: 36 % (ref 14–44)
MCH RBC QN AUTO: 28.2 PG (ref 26.8–34.3)
MCHC RBC AUTO-ENTMCNC: 33.2 G/DL (ref 31.4–37.4)
MCV RBC AUTO: 85 FL (ref 82–98)
MONOCYTES # BLD AUTO: 0.37 THOUSAND/ÂΜL (ref 0.17–1.22)
MONOCYTES NFR BLD AUTO: 7 % (ref 4–12)
NEUTROPHILS # BLD AUTO: 3.18 THOUSANDS/ÂΜL (ref 1.85–7.62)
NEUTS SEG NFR BLD AUTO: 55 % (ref 43–75)
NRBC BLD AUTO-RTO: 0 /100 WBCS
PLATELET # BLD AUTO: 285 THOUSANDS/UL (ref 149–390)
PMV BLD AUTO: 10.8 FL (ref 8.9–12.7)
RBC # BLD AUTO: 5.11 MILLION/UL (ref 3.81–5.12)
WBC # BLD AUTO: 5.73 THOUSAND/UL (ref 4.31–10.16)

## 2023-09-08 PROCEDURE — 36415 COLL VENOUS BLD VENIPUNCTURE: CPT

## 2023-09-08 PROCEDURE — 85025 COMPLETE CBC W/AUTO DIFF WBC: CPT

## 2023-09-12 ENCOUNTER — OFFICE VISIT (OUTPATIENT)
Dept: FAMILY MEDICINE CLINIC | Facility: CLINIC | Age: 28
End: 2023-09-12
Payer: COMMERCIAL

## 2023-09-12 VITALS
HEIGHT: 66 IN | WEIGHT: 224 LBS | OXYGEN SATURATION: 98 % | HEART RATE: 96 BPM | TEMPERATURE: 98.2 F | SYSTOLIC BLOOD PRESSURE: 110 MMHG | BODY MASS INDEX: 36 KG/M2 | DIASTOLIC BLOOD PRESSURE: 72 MMHG

## 2023-09-12 DIAGNOSIS — Z76.89 ENCOUNTER FOR WEIGHT MANAGEMENT: ICD-10-CM

## 2023-09-12 DIAGNOSIS — R10.2 PELVIC PAIN: Primary | ICD-10-CM

## 2023-09-12 PROCEDURE — 99213 OFFICE O/P EST LOW 20 MIN: CPT | Performed by: NURSE PRACTITIONER

## 2023-09-12 NOTE — PROGRESS NOTES
BMI Counseling: Body mass index is 36.15 kg/m². The BMI is above normal. Nutrition recommendations include decreasing portion sizes, encouraging healthy choices of fruits and vegetables, decreasing fast food intake, consuming healthier snacks, limiting drinks that contain sugar, moderation in carbohydrate intake, increasing intake of lean protein, reducing intake of saturated and trans fat and reducing intake of cholesterol. Exercise recommendations include vigorous physical activity 75 minutes/week, exercising 3-5 times per week and strength training exercises. No pharmacotherapy was ordered. Patient referred to PCP. Rationale for BMI follow-up plan is due to patient being overweight or obese. Depression Screening and Follow-up Plan: Patient was screened for depression during today's encounter. They screened negative with a PHQ-2 score of 0. Assessment/Plan:    Pt is a 34 yr old female   Presents in office for lab review and follow up urinary issues   Feels much better   Still some intermittent pelvic pressure   US ordered for follow up   Weight management discussed   Diet and exercise and caloric intake discussed        Problem List Items Addressed This Visit    None  Visit Diagnoses     Pelvic pain    -  Primary    we will follow up with US       Relevant Orders    US pelvis complete non OB    Encounter for weight management        Discussed diet and exercise for now   decrease to 1500 tomer diet  hydrate well   did offer  weight mangement referral - she would like to try on her own for now             Subjective:      Patient ID: Dustin Magana is a 34 y.o. female.     Pt is a 34 yr old female   Presents in office for lab review and follow up urinary issues   Feels much better   Still some intermittent pelvic pressure   US ordered for follow up   Weight management discussed   Diet and exercise and caloric intake discussed       The following portions of the patient's history were reviewed and updated as appropriate:   Past Medical History:  She has a past medical history of Varicella. ,  _______________________________________________________________________  Medical Problems:  does not have any pertinent problems on file.,  _______________________________________________________________________  Past Surgical History:   has a past surgical history that includes pr  delivery only (N/A, 2021). ,  _______________________________________________________________________  Family History:  family history includes Dementia in her paternal grandfather; No Known Problems in her father, half-brother, half-brother, half-brother, half-sister, half-sister, half-sister, half-sister, maternal grandfather, maternal grandmother, and mother.,  _______________________________________________________________________  Social History:   reports that she has never smoked. She has never used smokeless tobacco. She reports that she does not currently use alcohol. She reports that she does not currently use drugs after having used the following drugs: Marijuana. ,  _______________________________________________________________________  Allergies:  is allergic to penicillins. .  _______________________________________________________________________  No current outpatient medications on file. No current facility-administered medications for this visit.     _______________________________________________________________________  Review of Systems   Constitutional: Negative for chills, fatigue, fever and unexpected weight change. HENT: Negative for congestion, postnasal drip, sore throat and trouble swallowing. Eyes: Negative. Respiratory: Negative for cough and shortness of breath. Cardiovascular: Negative for chest pain and palpitations. Gastrointestinal: Positive for constipation (improved ). Negative for abdominal distention, abdominal pain, nausea and vomiting.    Endocrine:        History of elevated fasting glucose    Genitourinary: Negative for difficulty urinating, dysuria and urgency. Possible STD exposure - all testing negative  Urinary issues resolved   Still some pelvic pressure     Musculoskeletal: Negative for arthralgias and myalgias. Skin: Negative for rash. History of hydrantis  Supportive  Scar tissues noted to both armpits  - stable for now we will continue to follow up    Allergic/Immunologic: Positive for environmental allergies. Neurological: Positive for headaches. Hematological: Negative for adenopathy. Psychiatric/Behavioral: Negative for sleep disturbance and suicidal ideas. The patient is not nervous/anxious. Objective:  Vitals:    09/12/23 1343   BP: 110/72   BP Location: Right arm   Patient Position: Sitting   Cuff Size: Large   Pulse: 96   Temp: 98.2 °F (36.8 °C)   SpO2: 98%   Weight: 102 kg (224 lb)   Height: 5' 6" (1.676 m)     Body mass index is 36.15 kg/m². Physical Exam  Vitals and nursing note reviewed. Constitutional:       Appearance: Normal appearance. Comments: BMI 36.15   HENT:      Head: Atraumatic. Eyes:      Extraocular Movements: Extraocular movements intact. Cardiovascular:      Rate and Rhythm: Normal rate and regular rhythm. Pulses: Normal pulses. Heart sounds: Normal heart sounds. Pulmonary:      Effort: Pulmonary effort is normal.      Breath sounds: Normal breath sounds. Abdominal:      Palpations: Abdomen is soft. Musculoskeletal:      Right lower leg: No edema. Left lower leg: No edema. Skin:     General: Skin is warm. Capillary Refill: Capillary refill takes less than 2 seconds. Neurological:      Mental Status: She is alert and oriented to person, place, and time.    Psychiatric:         Mood and Affect: Mood normal.         Behavior: Behavior normal.       CBC and differential  Order: [id number] - Reflex for Order [id number]   Status: Final result      Visible to patient: Yes (seen)      Next appt: 12/12/2023 at 01:00 PM in [address])      Dx: Leukopenia, unspecified type      1 Result Note     1 Patient Communication             Component Ref Range & Units 9/8/23  1:36 PM 8/8/23 11:34 AM 6/21/21  4:53 AM 6/20/21 12:41 AM 6/18/21  7:40 PM 6/16/21  9:34 PM 2/17/21  4:42 PM   WBC 4.31 - 10.16 Thousand/uL 5.73  3.90 Low   11.42 High   16.26 High   11.60 High   8.37  8.02    RBC 3.81 - 5.12 Million/uL 5.11  4.83  3.38 Low   3.35 Low   4.26  4.01  3.94    Hemoglobin 11.5 - 15.4 g/dL 14.4  13.1  9.1 Low   8.9 Low   11.3 Low   10.9 Low   11.6    Hematocrit 34.8 - 46.1 % 43.4  42.1  29.7 Low   28.5 Low   36.0  33.3 Low   35.0    MCV 82 - 98 fL 85  87  88  85  85  83  89    MCH 26.8 - 34.3 pg 28.2  27.1  26.9  26.6 Low   26.5 Low   27.2  29.4    MCHC 31.4 - 37.4 g/dL 33.2  31.1 Low   30.6 Low   31.2 Low   31.4  32.7  33.1    RDW 11.6 - 15.1 % 13.7  13.6  15.9 High   15.3 High   15.1  14.6  12.2    MPV 8.9 - 12.7 fL 10.8  11.7  10.2  11.0  10.8  10.8  10.7    Platelets 323 - 409 Thousands/uL 285  238  225  222  233  243  207         Chlamydia/GC amplified DNA by PCR  Order: [id number]   Status: Final result      Visible to patient: Yes (seen)      Next appt: 12/12/2023 at 01:00 PM in [address])      Dx: Screen for STD (sexually transmitted . ..     Specimen Information: Urine, Other    0 Result Notes     1  Topic        Component Ref Range & Units 8/8/23 11:34 AM 2/17/21  2:38 PM   N gonorrhoeae, DNA Probe Negative Negative  Negative    Chlamydia trachomatis, DNA Probe Negative Negative  Negative                    RPR-Syphilis Screening (Total Syphilis IGG/IGM)  Order: [id number]   Status: Final result      Visible to patient: Yes (seen)      Next appt: 12/12/2023 at 01:00 PM in [facility], [address] [place] [address])      Dx: Screen for STD (sexually transmitted . ..      0 Result Notes       Component Ref Range & Units 8/8/23 11:34 AM   Syphilis Total Antibody Non-Reactive Non-reactive    Comment: No serological evidence of infection with T. pallidum. Early or incubating syphilis infection cannot be excluded. Consider repeat testing based on clinical suspicion. Hepatitis C antibody  Order: [id number]   Status: Final result      Visible to patient: Yes (seen)      Next appt: 12/12/2023 at 01:00 PM in [address])      Dx: Screen for STD (sexually transmitted . ..      0 Result Notes     1  Topic       Component Ref Range & Units 8/8/23 11:34 AM   Hepatitis C Ab Non-Reactive Non-reactive               Specimen Collected: 08/08/23 11:34 AM Last Resulted: 08/09/23  2:22 PM              Lipid panel  Order: [id number]   Status: Final result      Visible to patient: Yes (seen)      Next appt: 12/12/2023 at 01:00 PM in [address])      Dx: Hidradenitis suppurativa; Elevated fa. ..      0 Result Notes       Component Ref Range & Units 8/8/23 11:34 AM   Cholesterol See Comment mg/dL 142    Comment: Cholesterol:         Pediatric <18 Years         Desirable          <170 mg/dL       Borderline High    170-199 mg/dL       High               >=200 mg/dL         Adult >=18 Years             Desirable         <200 mg/dL       Borderline High   200-239 mg/dL       High             >239 mg/dL    Triglycerides See Comment mg/dL 54    Comment: Triglyceride:      0-9Y            <75mg/dL      10Y-17Y         <90 mg/dL        >=18Y      Normal          <150 mg/dL      Borderline High 150-199 mg/dL      High            200-499 mg/dL        Very High       >499 mg/dL     Specimen collection should occur prior to N-Acetylcysteine or Metamizole administration due to the potential for falsely depressed results. HDL, Direct >=50 mg/dL 62    Comment: Specimen collection should occur prior to Metamizole administration due to the potential for falsley depressed results.    LDL Calculated 0 - 100 mg/dL 69    Comment: Unable to calculate   LDL Cholesterol:     Optimal           <100 mg/dl     Near Optimal      100-129 mg/dl     Above Optimal       Borderline High 130-159 mg/dl       High            160-189 mg/dl       Very High       >189 mg/dl           This screening LDL is a calculated result. It does not have the accuracy of the Direct Measured LDL in the monitoring of patients with hyperlipidemia and/or statin therapy. Direct Measure LDL (XOP300) must be ordered separately in these patients. Non-HDL-Chol (CHOL-HDL) mg/dl 80               Specimen Collected: 08/08/23 11:34 AM Last Resulted: 08/08/23  8:39 PM            HEMOGLOBIN A1C W/ EAG ESTIMATION  Order: [id number]   Status: Final result      Visible to patient: Yes (seen)      Next appt: 12/12/2023 at 01:00 PM in [address])      Dx: Hidradenitis suppurativa; Elevated fa. ..      0 Result Notes       Component Ref Range & Units 8/8/23 11:34 AM   Hemoglobin A1C Normal 4.0-5.6%; PreDiabetic 5.7-6.4%; Diabetic >=6.5%; Glycemic control for adults with diabetes <7.0% % 5.2    EAG mg/dl 103               Specimen Collected: 08/08/23 11:34 AM Last Resulted: 08/08/23 10:22 PM           TSH, 3rd generation with Free T4 reflex  Order: [id number]   Status: Final result      Visible to patient: Yes (seen)      Next appt: 12/12/2023 at 01:00 PM in [address])      Dx: Hidradenitis suppurativa; Elevated fa. ..      0 Result Notes       Component Ref Range & Units 8/8/23 11:34 AM   TSH 3RD GENERATON 0.450 - 4.500 uIU/mL 0.825    Comment: The recommended reference ranges for TSH during pregnancy are as follows:   First trimester 0.1 to 2.5 uIU/mL   Second trimester  0.2 to 3.0 uIU/mL   Third trimester 0.3 to 3.0 uIU/m     Note: Normal ranges may not apply to patients who are transgender, non-binary, or whose legal sex, sex at birth, and gender identity differ.    Adult TSH (3rd generation) reference range follows the recommended guidelines of the [employer], January, 2020. Specimen Collected: 08/08/23 11:34 AM Last Resulted: 08/09/23 12:06 AM           Ferritin  Order: [id number] - Part of Panel Order [id number]   Status: Final result   Visible to patient: Yes (seen)   Next appt: 12/12/2023 at 01:00 PM in [facility])   Dx: Hidradenitis suppurativa; Elevated fa. ..   0 Result Notes       Component Ref Range & Units 8/8/23 11:34 AM   Ferritin 11 - 307 ng/mL 40              Specimen Collected: 08/08/23 11:34 AM Last Resulted: 08/09/23 12:06 AM

## 2023-11-20 ENCOUNTER — TELEMEDICINE (OUTPATIENT)
Dept: FAMILY MEDICINE CLINIC | Facility: CLINIC | Age: 28
End: 2023-11-20
Payer: COMMERCIAL

## 2023-11-20 DIAGNOSIS — L73.2 HIDRADENITIS SUPPURATIVA: Primary | ICD-10-CM

## 2023-11-20 PROCEDURE — 99213 OFFICE O/P EST LOW 20 MIN: CPT | Performed by: NURSE PRACTITIONER

## 2023-11-20 RX ORDER — TRIAMCINOLONE ACETONIDE 1 MG/G
CREAM TOPICAL 2 TIMES DAILY
Qty: 80 G | Refills: 0 | Status: SHIPPED | OUTPATIENT
Start: 2023-11-20 | End: 2023-11-30

## 2023-11-20 RX ORDER — FLUCONAZOLE 150 MG/1
150 TABLET ORAL ONCE
Qty: 1 TABLET | Refills: 0 | Status: SHIPPED | OUTPATIENT
Start: 2023-11-20 | End: 2023-11-20

## 2023-11-20 RX ORDER — DOXYCYCLINE HYCLATE 100 MG
100 TABLET ORAL 2 TIMES DAILY
Qty: 10 TABLET | Refills: 0 | Status: SHIPPED | OUTPATIENT
Start: 2023-11-20 | End: 2023-11-25

## 2023-11-20 NOTE — PROGRESS NOTES
Virtual Regular Visit    Verification of patient location:    Patient is located at Home in the following state in which I hold an active license [place]      Assessment/Plan:    Pt is a 34 yr old female   Presents via VIRTUAL VISIT for hydradenitis suppurativa recurrent breakouts to axilla - discussed supportive measures and treatment options at this point. We did discussed preventative  options  with metformin low carb low fat diet   - does not want to try metformin as she feel they are controlled for most part   Will continue to monitor   Ordered DOXY   Ordered diflucan for yeast infection post antibiotics   Ordered triamcinolone cream as it has worked in the past   If worsening of symptoms she is to call or go to the ER   Follow up in few weeks to revaluate     Problem List Items Addressed This Visit          Musculoskeletal and Integument    Hidradenitis suppurativa - Primary    Relevant Medications    doxycycline hyclate (VIBRA-TABS) 100 mg tablet    triamcinolone (KENALOG) 0.1 % cream    fluconazole (DIFLUCAN) 150 mg tablet            Reason for visit is   Chief Complaint   Patient presents with    Virtual Regular Visit          Encounter provider DARSHANA Aldrich    Provider located at [address] [place] [address]  [phone]      Recent Visits  No visits were found meeting these conditions. Showing recent visits within past 7 days and meeting all other requirements  Today's Visits  Date Type Provider Dept   11/20/23 Telemedicine Brett Bain, [facility] today's visits and meeting all other requirements  Future Appointments  No visits were found meeting these conditions. Showing future appointments within next 150 days and meeting all other requirements       The patient was identified by name and date of birth.  Dustin Magana was informed that this is a telemedicine visit and that the visit is being conducted through the [employer]. She agrees to proceed. .  My office door was closed. No one else was in the room. She acknowledged consent and understanding of privacy and security of the video platform. The patient has agreed to participate and understands they can discontinue the visit at any time. Patient is aware this is a billable service. Subjective  Jazzy Carr is a 34 y.o. female follow up boils to left armpit axilla with history of hidradenitis suppurative  . Pt is a 34 yr old female   Presents via VIRTUAL VISIT for hydradenitis suppurativa recurrent breakouts to axilla - discussed supportive measures and treatment options at this point. We did discussed preventative  options  with metformin low carb low fat diet   - does not want to try metformin as she feel they are controlled for most part   Will continue to monitor   Ordered DOXY   Ordered diflucan for yeast infection post antibiotics   Ordered triamcinolone cream as it has worked in the past   If worsening of symptoms she is to call or go to the ER   Follow up in few weeks to revaluate          Past Medical History:   Diagnosis Date    Varicella     got vaccine       Past Surgical History:   Procedure Laterality Date    [place]  DELIVERY ONLY N/A 2021    Procedure:  SECTION (); Surgeon: Rufus Castañeda MD;  Location: [facility] ;  Service: Obstetrics       Current Outpatient Medications   Medication Sig Dispense Refill    doxycycline hyclate (VIBRA-TABS) 100 mg tablet Take 1 tablet (100 mg total) by mouth 2 (two) times a day for 5 days Take with food 10 tablet 0    fluconazole (DIFLUCAN) 150 mg tablet Take 1 tablet (150 mg total) by mouth once for 1 dose 1 tablet 0    triamcinolone (KENALOG) 0.1 % cream Apply topically 2 (two) times a day for 10 days 80 g 0     No current facility-administered medications for this visit.         Allergies   Allergen Reactions    Penicillins Itching       Review of Systems Constitutional:  Negative for chills, fatigue and fever. HENT:  Negative for congestion and nosebleeds. Respiratory:  Negative for cough and shortness of breath. Gastrointestinal:  Negative for abdominal distention, abdominal pain, nausea and vomiting. Endocrine:        POCS    Skin:         Hydradenitis suppurative - has boils to left axilla    Psychiatric/Behavioral:  The patient is not nervous/anxious. Video Exam    There were no vitals filed for this visit. Physical Exam  Vitals and nursing note reviewed. HENT:      Head: Normocephalic and atraumatic. Nose: No congestion or rhinorrhea. Eyes:      Extraocular Movements: Extraocular movements intact. Pulmonary:      Effort: Pulmonary effort is normal.   Skin:     Findings: Erythema present. Comments: Boils noted to left axilla      Neurological:      Mental Status: She is alert and oriented to person, place, and time.    Psychiatric:         Mood and Affect: Mood normal.         Behavior: Behavior normal.          Visit Time  Total Visit Duration: 20

## 2023-12-12 ENCOUNTER — OFFICE VISIT (OUTPATIENT)
Dept: FAMILY MEDICINE CLINIC | Facility: CLINIC | Age: 28
End: 2023-12-12
Payer: COMMERCIAL

## 2023-12-12 ENCOUNTER — TELEPHONE (OUTPATIENT)
Dept: FAMILY MEDICINE CLINIC | Facility: CLINIC | Age: 28
End: 2023-12-12

## 2023-12-12 VITALS
HEIGHT: 67 IN | TEMPERATURE: 98.1 F | SYSTOLIC BLOOD PRESSURE: 100 MMHG | OXYGEN SATURATION: 98 % | WEIGHT: 222 LBS | HEART RATE: 99 BPM | DIASTOLIC BLOOD PRESSURE: 78 MMHG | BODY MASS INDEX: 34.84 KG/M2

## 2023-12-12 DIAGNOSIS — R73.01 ELEVATED FASTING GLUCOSE: ICD-10-CM

## 2023-12-12 DIAGNOSIS — D72.819 LEUKOPENIA, UNSPECIFIED TYPE: ICD-10-CM

## 2023-12-12 DIAGNOSIS — R10.2 PELVIC PAIN: ICD-10-CM

## 2023-12-12 DIAGNOSIS — L73.2 HIDRADENITIS SUPPURATIVA: Primary | ICD-10-CM

## 2023-12-12 DIAGNOSIS — K59.01 SLOW TRANSIT CONSTIPATION: ICD-10-CM

## 2023-12-12 PROCEDURE — 99214 OFFICE O/P EST MOD 30 MIN: CPT | Performed by: NURSE PRACTITIONER

## 2023-12-12 RX ORDER — METFORMIN HYDROCHLORIDE 500 MG/1
500 TABLET, EXTENDED RELEASE ORAL
Qty: 30 TABLET | Refills: 0 | Status: SHIPPED | OUTPATIENT
Start: 2023-12-12 | End: 2024-01-11

## 2023-12-12 RX ORDER — SULFAMETHOXAZOLE AND TRIMETHOPRIM 800; 160 MG/1; MG/1
1 TABLET ORAL 2 TIMES DAILY
Qty: 14 TABLET | Refills: 0 | Status: SHIPPED | OUTPATIENT
Start: 2023-12-12 | End: 2023-12-19

## 2023-12-12 NOTE — PROGRESS NOTES
Assessment/Plan:       1. Hidradenitis suppurativa  Assessment & Plan:  I had put her on Bactrim DS. It does not look like she is allergic. I strongly encouraged using metformin 500 mg once a day because she does have some elevated fasting glucose and she has history of PCOS, which she has not really truly been worked up for it, but I think that it may help her with this situation, and it will also help her with weight loss. She has been trying to lose weight and she's having a hard time. We spent an extended amount of time talking about her diet, low sugar, low fat, low carbs and if no carbs, it is possible that she will be optimal.   She should be getting rid of all the carbs and sugars from her diet to improve the skin issues with the hidradenitis suppurativa. She gets keloids from all these scar tissues that she's all having from these flareups, so I strongly recommend taking metformin and see how she does in terms of prevention. At this point, she is to take the medications with food. I am referring her to general surgery to see if they can get her in to drain it and to see what we can do in terms of localized treatment. Orders:  -     CBC and differential; Future  -     TSH, 3rd generation with Free T4 reflex; Future  -     Comprehensive metabolic panel; Future  -     UA w Reflex to Microscopic w Reflex to Culture -Lab Collect; Future; Expected date: 12/12/2023  -     Ambulatory Referral to General Surgery; Future  -     sulfamethoxazole-trimethoprim (BACTRIM DS) 800-160 mg per tablet; Take 1 tablet by mouth 2 (two) times a day for 7 days  -     metFORMIN (GLUCOPHAGE-XR) 500 mg 24 hr tablet; Take 1 tablet (500 mg total) by mouth daily with dinner  -     mupirocin (BACTROBAN) 2 % ointment; Apply topically 3 (three) times a day for 5 days    2. Pelvic pain  -     CBC and differential; Future  -     TSH, 3rd generation with Free T4 reflex; Future  -     Comprehensive metabolic panel;  Future  - UA w Reflex to Microscopic w Reflex to Culture -Lab Collect; Future; Expected date: 12/12/2023    3. Slow transit constipation  -     CBC and differential; Future  -     TSH, 3rd generation with Free T4 reflex; Future  -     Comprehensive metabolic panel; Future  -     UA w Reflex to Microscopic w Reflex to Culture -Lab Collect; Future; Expected date: 12/12/2023    4. Elevated fasting glucose  -     CBC and differential; Future  -     TSH, 3rd generation with Free T4 reflex; Future  -     Comprehensive metabolic panel; Future  -     UA w Reflex to Microscopic w Reflex to Culture -Lab Collect; Future; Expected date: 12/12/2023  -     sulfamethoxazole-trimethoprim (BACTRIM DS) 800-160 mg per tablet; Take 1 tablet by mouth 2 (two) times a day for 7 days  -     metFORMIN (GLUCOPHAGE-XR) 500 mg 24 hr tablet; Take 1 tablet (500 mg total) by mouth daily with dinner    5. Leukopenia, unspecified type  -     CBC and differential; Future  -     TSH, 3rd generation with Free T4 reflex; Future  -     Comprehensive metabolic panel; Future  -     UA w Reflex to Microscopic w Reflex to Culture -Lab Collect; Future; Expected date: 12/12/2023  -     sulfamethoxazole-trimethoprim (BACTRIM DS) 800-160 mg per tablet; Take 1 tablet by mouth 2 (two) times a day for 7 days  -     metFORMIN (GLUCOPHAGE-XR) 500 mg 24 hr tablet; Take 1 tablet (500 mg total) by mouth daily with dinner    Pelvic pain. I strongly encourage her to get the ultrasound done that I ordered just to see if she is having any polycystic ovarian issues happening. She should get it done and repeat the blood work as discussed. Constipation. This has gotten better. We discussed healthy diet, low fat diet and low carbs and low sugars. Low white blood cells were noted previously. We will follow up.    The repeat blood work was okay last time, so we will keep an eye on that, but she is having this chronic inflammation happening with her skin, so that may be related to that. Follow-up. The patient will follow up in about 3 to 4 weeks. BMI Counseling: Body mass index is 34.77 kg/m². The BMI is above normal. Nutrition recommendations include decreasing portion sizes, encouraging healthy choices of fruits and vegetables, decreasing fast food intake, consuming healthier snacks, limiting drinks that contain sugar, moderation in carbohydrate intake, increasing intake of lean protein, reducing intake of saturated and trans fat and reducing intake of cholesterol. Exercise recommendations include vigorous physical activity 75 minutes/week, exercising 3-5 times per week and strength training exercises. Patient referred to PCP. Rationale for BMI follow-up plan is due to patient being overweight or obese. Depression Screening and Follow-up Plan: Patient was screened for depression during today's encounter. They screened negative with a PHQ-2 score of 0. Tobacco Cessation Counseling: Tobacco cessation counseling was provided. The patient is sincerely urged to quit consumption of tobacco. She is not ready to quit tobacco. Medication options and side effects of medication discussed. Subjective:      Patient ID: Dustin Magana is a 34 y.o. female who presents in office for 2-week follow-up after treatment for hidradenitis suppurativa and pelvic pain. Pelvic pain  She previously sought attention for pelvic concerns prior, however she has not undergone the diagnostic evaluations we discussed. Hidradenitis suppurativa  A few weeks ago, she had telemedicine consultation due to the exacerbation and flare up of hidradenitis suppurativa to her left axilla. At that time, the affected area was notably swollen and inflamed which was treated with doxycycline and topical solutions. The symptoms improved; however, it recurred 2 days ago after she stopped taking the antibiotics.    She presents with evident follicular cyst drainage from her left axilla and the area is tender upon touch. She also has a couple of spots in the bilateral groin area that appear normal, although she perceives a palpable bump emerging underneath. She has an abundance of scar tissue in her left axilla. Another concern is that she tends to develop scar tissues during the healing process. She experiences discoloration and thickening to the affected area. She is allergic to PENICILLIN. The following portions of the patient's history were reviewed and updated as appropriate: allergies, current medications, past family history, past medical history, past social history, past surgical history, and problem list.    Review of Systems   Constitutional:  Negative for chills, fatigue and fever. HENT:  Negative for congestion and nosebleeds. Respiratory:  Negative for cough and shortness of breath. Gastrointestinal:  Negative for abdominal distention, abdominal pain, nausea and vomiting. Endocrine:        POCS   Hydrenitis supporitiva     Skin:         Hydradenitis suppurative - has boils to left axilla   They were better after treatment but few days ago reoccurred    Hematological:  Positive for adenopathy. Psychiatric/Behavioral:  Negative for sleep disturbance and suicidal ideas. The patient is nervous/anxious. Objective:  /78 (BP Location: Right arm, Patient Position: Sitting, Cuff Size: Large)   Pulse 99   Temp 98.1 °F (36.7 °C)   Ht 5' 7"   Wt 101 kg (222 lb)   SpO2 98%   BMI 34.77 kg/m²          Physical Exam  Vitals and nursing note reviewed. Constitutional:       Appearance: Normal appearance. Comments: BMI 34.77   HENT:      Head: Atraumatic. Eyes:      Extraocular Movements: Extraocular movements intact. Cardiovascular:      Rate and Rhythm: Normal rate and regular rhythm. Pulses: Normal pulses. Heart sounds: Normal heart sounds. Pulmonary:      Effort: Pulmonary effort is normal.      Breath sounds: Normal breath sounds. Abdominal:      Palpations: Abdomen is soft. Skin:     Findings: Erythema present. Comments: Tenderness swelling and pain to left axilla   Draining boil   Does have keloids    Neurological:      Mental Status: She is alert and oriented to person, place, and time. Psychiatric:         Mood and Affect: Mood normal.         Behavior: Behavior normal.             Transcribed for DARSHANA Ramirez, by Lucien Hernandez on 12/12/23 at 3:35 PM. Powered by [employer].

## 2023-12-12 NOTE — ASSESSMENT & PLAN NOTE
I had put her on Bactrim DS. It does not look like she is allergic. I strongly encouraged using metformin 500 mg once a day because she does have some elevated fasting glucose and she has history of PCOS, which she has not really truly been worked up for it, but I think that it may help her with this situation, and it will also help her with weight loss. She has been trying to lose weight and she's having a hard time. We spent an extended amount of time talking about her diet, low sugar, low fat, low carbs and if no carbs, it is possible that she will be optimal.   She should be getting rid of all the carbs and sugars from her diet to improve the skin issues with the hidradenitis suppurativa. She gets keloids from all these scar tissues that she's all having from these flareups, so I strongly recommend taking metformin and see how she does in terms of prevention. At this point, she is to take the medications with food. I am referring her to general surgery to see if they can get her in to drain it and to see what we can do in terms of localized treatment.

## 2023-12-12 NOTE — TELEPHONE ENCOUNTER
Sienna Babin,     As discussed at the visit - Rashel Quarles is primary care provider. Make sure you call your insurance ahead of time resp. Immediately and advise them that you are changing your primary care physician. Rashel Quarles works under Dr Carolynn Dela Cruz # [id number]    Current assignment : [id number] [address]       If you have any questions or concerns,  please call the office @ [phone]. Have a great day!

## 2024-05-23 ENCOUNTER — VBI (OUTPATIENT)
Dept: ADMINISTRATIVE | Facility: OTHER | Age: 29
End: 2024-05-23

## 2025-03-11 ENCOUNTER — TELEPHONE (OUTPATIENT)
Dept: FAMILY MEDICINE CLINIC | Facility: CLINIC | Age: 30
End: 2025-03-11

## 2025-04-07 ENCOUNTER — ULTRASOUND (OUTPATIENT)
Dept: OBGYN CLINIC | Facility: CLINIC | Age: 30
End: 2025-04-07

## 2025-04-07 VITALS
DIASTOLIC BLOOD PRESSURE: 70 MMHG | SYSTOLIC BLOOD PRESSURE: 112 MMHG | BODY MASS INDEX: 35.77 KG/M2 | HEIGHT: 66 IN | WEIGHT: 222.6 LBS

## 2025-04-07 DIAGNOSIS — O99.210 OBESITY AFFECTING PREGNANCY, ANTEPARTUM, UNSPECIFIED OBESITY TYPE: Primary | ICD-10-CM

## 2025-04-07 DIAGNOSIS — O09.299 HX OF PREECLAMPSIA, PRIOR PREGNANCY, CURRENTLY PREGNANT, UNSPECIFIED TRIMESTER: ICD-10-CM

## 2025-04-07 DIAGNOSIS — O34.211 MATERNAL CARE DUE TO LOW TRANSVERSE UTERINE SCAR FROM PREVIOUS CESAREAN DELIVERY: ICD-10-CM

## 2025-04-07 DIAGNOSIS — Z34.91 UNCERTAIN DATES, ANTEPARTUM, FIRST TRIMESTER: ICD-10-CM

## 2025-04-07 DIAGNOSIS — O36.80X0 ENCOUNTER TO DETERMINE FETAL VIABILITY OF PREGNANCY, SINGLE OR UNSPECIFIED FETUS: ICD-10-CM

## 2025-04-07 DIAGNOSIS — O09.31: ICD-10-CM

## 2025-04-07 PROCEDURE — 76817 TRANSVAGINAL US OBSTETRIC: CPT | Performed by: OBSTETRICS & GYNECOLOGY

## 2025-04-07 PROCEDURE — 99213 OFFICE O/P EST LOW 20 MIN: CPT | Performed by: OBSTETRICS & GYNECOLOGY

## 2025-04-07 NOTE — ASSESSMENT & PLAN NOTE
LMP some time in January. Reports some breast tenderness,nipple changes, no change in appetite.  Had two episodes of pink spotting since January.   Orders:    AMB US OB < 14 weeks single or first gestation level 1    Ambulatory Referral to Maternal Fetal Medicine; Future

## 2025-04-07 NOTE — PROGRESS NOTES
"Name: Talya Finley      : 1995      MRN: [id number]  Encounter Provider: Kelli Espinosa MD  Encounter Date: 2025   Encounter department: [facility]  :  Assessment & Plan  Prenatal care insufficient, first trimester  EGA from this imaging 10/12/2025. Some views difficult to obtain, will order MFM imaging to verify due date.   Orders:    AMB US OB < 14 weeks single or first gestation level 1    Ambulatory Referral to Maternal Fetal Medicine; Future    Uncertain dates, antepartum, first trimester  LMP some time in January. Reports some breast tenderness,nipple changes, no change in appetite.  Had two episodes of pink spotting since January.   Orders:    AMB US OB < 14 weeks single or first gestation level 1    Ambulatory Referral to Maternal Fetal Medicine; Future    Obesity affecting pregnancy, antepartum, unspecified obesity type    Orders:    Ambulatory Referral to Maternal Fetal Medicine; Future    Encounter to determine fetal viability of pregnancy, single or unspecified fetus    Orders:    AMB US OB < 14 weeks single or first gestation level 1    Maternal care due to low transverse uterine scar from previous  delivery    Orders:    Ambulatory Referral to Maternal Fetal Medicine; Future    Hx of preeclampsia, prior pregnancy, currently pregnant, unspecified trimester    Orders:    Ambulatory Referral to Maternal Fetal Medicine; Future        History of Present Illness   HPI  Talya Finley is a 30 y.o. female who presents for her early ultrasound. LMP late 2025, unsure of exact date. This is her 2nd pregnancy and was not planned but is welcoming. Began taking prenatal vitamin. Symptoms include pelvic pain.      Review of Systems       Objective   /70 (BP Location: Left arm, Patient Position: Sitting, Cuff Size: Adult)   Ht 5' 6\" (1.676 m)   Wt 101 kg (222 lb 9.6 oz)   LMP  (LMP Unknown)   BMI 35.93 kg/m²    "             Physical Exam

## 2025-04-07 NOTE — ASSESSMENT & PLAN NOTE
EGA from this imaging 10/12/2025. Some views difficult to obtain, will order MFM imaging to verify due date.   Orders:    AMB US OB < 14 weeks single or first gestation level 1    Ambulatory Referral to Maternal Fetal Medicine; Future

## 2025-04-16 PROBLEM — O99.212 OBESITY AFFECTING PREGNANCY IN SECOND TRIMESTER: Status: ACTIVE | Noted: 2025-04-16

## 2025-04-16 PROBLEM — O34.219 HISTORY OF CESAREAN DELIVERY, ANTEPARTUM: Status: ACTIVE | Noted: 2025-04-16

## 2025-04-16 PROBLEM — Z36.89 ENCOUNTER FOR FETAL ANATOMIC SURVEY: Status: ACTIVE | Noted: 2025-04-16

## 2025-04-17 ENCOUNTER — ROUTINE PRENATAL (OUTPATIENT)
Dept: PERINATAL CARE | Facility: OTHER | Age: 30
End: 2025-04-17
Attending: OBSTETRICS & GYNECOLOGY
Payer: COMMERCIAL

## 2025-04-17 VITALS
DIASTOLIC BLOOD PRESSURE: 70 MMHG | BODY MASS INDEX: 35.36 KG/M2 | HEART RATE: 78 BPM | OXYGEN SATURATION: 99 % | HEIGHT: 66 IN | SYSTOLIC BLOOD PRESSURE: 110 MMHG | WEIGHT: 220 LBS

## 2025-04-17 DIAGNOSIS — Z34.91 UNCERTAIN DATES, ANTEPARTUM, FIRST TRIMESTER: ICD-10-CM

## 2025-04-17 DIAGNOSIS — O99.210 OBESITY AFFECTING PREGNANCY, ANTEPARTUM, UNSPECIFIED OBESITY TYPE: ICD-10-CM

## 2025-04-17 DIAGNOSIS — Z36.0 ENCOUNTER FOR ANTENATAL SCREENING FOR CHROMOSOMAL ANOMALIES: ICD-10-CM

## 2025-04-17 DIAGNOSIS — O09.299 HX OF PREECLAMPSIA, PRIOR PREGNANCY, CURRENTLY PREGNANT, UNSPECIFIED TRIMESTER: Primary | ICD-10-CM

## 2025-04-17 DIAGNOSIS — O34.219 HISTORY OF CESAREAN DELIVERY, ANTEPARTUM: ICD-10-CM

## 2025-04-17 DIAGNOSIS — O99.320 MARIJUANA USE DURING PREGNANCY: ICD-10-CM

## 2025-04-17 DIAGNOSIS — F12.90 MARIJUANA USE DURING PREGNANCY: ICD-10-CM

## 2025-04-17 DIAGNOSIS — Z36.82 ENCOUNTER FOR (NT) NUCHAL TRANSLUCENCY SCAN: ICD-10-CM

## 2025-04-17 DIAGNOSIS — Z3A.12 12 WEEKS GESTATION OF PREGNANCY: ICD-10-CM

## 2025-04-17 DIAGNOSIS — O34.211 MATERNAL CARE DUE TO LOW TRANSVERSE UTERINE SCAR FROM PREVIOUS CESAREAN DELIVERY: ICD-10-CM

## 2025-04-17 DIAGNOSIS — O99.212 OBESITY AFFECTING PREGNANCY IN SECOND TRIMESTER, UNSPECIFIED OBESITY TYPE: ICD-10-CM

## 2025-04-17 PROBLEM — O09.31: Status: RESOLVED | Noted: 2025-04-07 | Resolved: 2025-04-17

## 2025-04-17 PROCEDURE — 76801 OB US < 14 WKS SINGLE FETUS: CPT | Performed by: STUDENT IN AN ORGANIZED HEALTH CARE EDUCATION/TRAINING PROGRAM

## 2025-04-17 PROCEDURE — 36415 COLL VENOUS BLD VENIPUNCTURE: CPT | Performed by: STUDENT IN AN ORGANIZED HEALTH CARE EDUCATION/TRAINING PROGRAM

## 2025-04-17 PROCEDURE — 99244 OFF/OP CNSLTJ NEW/EST MOD 40: CPT

## 2025-04-17 PROCEDURE — 76813 OB US NUCHAL MEAS 1 GEST: CPT | Performed by: STUDENT IN AN ORGANIZED HEALTH CARE EDUCATION/TRAINING PROGRAM

## 2025-04-17 RX ORDER — ASPIRIN 81 MG/1
162 TABLET ORAL DAILY
Qty: 60 TABLET | Refills: 3 | Status: SHIPPED | OUTPATIENT
Start: 2025-04-17

## 2025-04-17 NOTE — PROGRESS NOTES
This patient received  care under my supervision on 25 at 12w6d gestational age at [facility].  The note is contained in the ultrasound report located under OB Procedures tab in Epic.  Please call our office at [phone] with questions.  -Arabella Mcmullen MD

## 2025-04-17 NOTE — PROGRESS NOTES
Patient chose to have [facility] [id number] Non-Invasive Prenatal Screen [id number] PLUS w/ SCA, WITH fetal sex.  Patient choose [facility]'s self-pay option of $299.     Patient given brochure and is aware [facility] will contact patient's insurance and coordinate coverage.  Provided [facility] contact information. General inquiries [phone], Cost estimates [phone] and [facility] Billing [phone]. Website [email or web address].     Blood collection tubes labeled with patient identifiers (name, medical record number, and date of birth).     Filled out [facility] order form. Patient chose to have blood drawn in our office at time of visit. NIPS was drawn from left arm with a butterfly needle by Ellie GILLILAND.    Maternal Fetal Medicine will have results in approximately 5-7 business days and will call patient or notify via [employer].  Patient aware viewing lab result online will reveal fetal sex if ordered.    Patient verbalized understanding of all instructions and no questions at this time.

## 2025-04-17 NOTE — LETTER
2025     Kelli Espinosa MD  [address]  [address] [place] [id number]    Patient: Talya Finley   YOB: 1995   Date of Visit: 2025       Dear Dr. Kelli Espinosa MD:    Thank you for referring Talya Finley to me for evaluation. Below are my notes for this consultation.    If you have questions, please do not hesitate to call me. I look forward to following your patient along with you.         Sincerely,        Arabella Mcmullen MD        CC: No Recipients    Arabella Mcmullen MD  2025  4:11 PM  Sign when Signing Visit  This patient received  care under my supervision on 25 at 12w6d gestational age at [facility].  The note is contained in the ultrasound report located under OB Procedures tab in Epic.  Please call our office at [phone] with questions.  -Arabella Mcmullen MD

## 2025-04-21 LAB
CFDNA.FET/CFDNA.TOTAL SFR FETUS: NORMAL %
CFDNA.FET/CFDNA.TOTAL SFR FETUS: NORMAL %
CITATION REF LAB TEST: NORMAL
FET 13+18+21+X+Y ANEUP PLAS.CFDNA: NEGATIVE
FET CHR 21 TS PLAS.CFDNA QL: NEGATIVE
FET CHR 21 TS PLAS.CFDNA QL: NEGATIVE
FET MS X RISK WBC.DNA+CFDNA QL: NOT DETECTED
FET SEX PLAS.CFDNA DOSAGE CFDNA: NORMAL
FET TS 13 RISK PLAS.CFDNA QL: NEGATIVE
FET X + Y ANEUP RISK PLAS.CFDNA SEQ-IMP: NOT DETECTED
GA EST FROM CONCEPTION DATE: NORMAL D
GESTATIONAL AGE > 9:: YES
LAB DIRECTOR NAME PROVIDER: NORMAL
LABORATORY COMMENT REPORT: NORMAL
LIMITATIONS OF THE TEST: NORMAL
NEGATIVE PREDICTIVE VALUE: NORMAL
PERFORMANCE CHARACTERISTICS: NORMAL
POSITIVE PREDICTIVE VALUE: NORMAL
REF LAB TEST METHOD: NORMAL
SL AMB NOTE:: NORMAL
TEST PERFORMANCE INFO SPEC: NORMAL

## 2025-04-21 NOTE — PATIENT INSTRUCTIONS
Congratulations!! Please review our Pregnancy Essential Guide and [facility] L&D Virtual tour from our networks website.     [facility] Pregnancy Essentials Guide  [facility] Women's Health ([email or web address])     [facility] - Virtual Tour ([employer])         Check out “Baby & Me Hospital Readiness Class” from [facility] on [place].   The video is available for your viewing pleasure at [email or web address]

## 2025-04-22 ENCOUNTER — PATIENT MESSAGE (OUTPATIENT)
Dept: OBGYN CLINIC | Facility: CLINIC | Age: 30
End: 2025-04-22

## 2025-04-22 ENCOUNTER — APPOINTMENT (OUTPATIENT)
Dept: LAB | Facility: HOSPITAL | Age: 30
End: 2025-04-22
Payer: COMMERCIAL

## 2025-04-22 ENCOUNTER — RESULTS FOLLOW-UP (OUTPATIENT)
Dept: PERINATAL CARE | Facility: OTHER | Age: 30
End: 2025-04-22

## 2025-04-22 ENCOUNTER — INITIAL PRENATAL (OUTPATIENT)
Dept: OBGYN CLINIC | Facility: CLINIC | Age: 30
End: 2025-04-22
Payer: COMMERCIAL

## 2025-04-22 VITALS
SYSTOLIC BLOOD PRESSURE: 118 MMHG | BODY MASS INDEX: 34.53 KG/M2 | WEIGHT: 220 LBS | HEIGHT: 67 IN | DIASTOLIC BLOOD PRESSURE: 75 MMHG | HEART RATE: 88 BPM

## 2025-04-22 DIAGNOSIS — Z34.81 PRENATAL CARE, SUBSEQUENT PREGNANCY, FIRST TRIMESTER: Primary | ICD-10-CM

## 2025-04-22 DIAGNOSIS — Z34.81 PRENATAL CARE, SUBSEQUENT PREGNANCY, FIRST TRIMESTER: ICD-10-CM

## 2025-04-22 LAB
BILIRUB UR QL STRIP: NEGATIVE
CLARITY UR: CLEAR
COLOR UR: COLORLESS
CREAT UR-MCNC: 40.4 MG/DL
GLUCOSE UR STRIP-MCNC: NEGATIVE MG/DL
HGB UR QL STRIP.AUTO: NEGATIVE
KETONES UR STRIP-MCNC: NEGATIVE MG/DL
LEUKOCYTE ESTERASE UR QL STRIP: NEGATIVE
NITRITE UR QL STRIP: NEGATIVE
PH UR STRIP.AUTO: 6.5 [PH]
PROT UR STRIP-MCNC: NEGATIVE MG/DL
PROT UR-MCNC: <4 MG/DL
SP GR UR STRIP.AUTO: 1.01 (ref 1–1.03)
UROBILINOGEN UR STRIP-ACNC: <2 MG/DL

## 2025-04-22 PROCEDURE — 81003 URINALYSIS AUTO W/O SCOPE: CPT

## 2025-04-22 PROCEDURE — 87086 URINE CULTURE/COLONY COUNT: CPT

## 2025-04-22 PROCEDURE — T1001 NURSING ASSESSMENT/EVALUATN: HCPCS

## 2025-04-22 PROCEDURE — 84156 ASSAY OF PROTEIN URINE: CPT

## 2025-04-22 PROCEDURE — 82570 ASSAY OF URINE CREATININE: CPT

## 2025-04-22 NOTE — PROGRESS NOTES
"  OB INTAKE INTERVIEW  Patient is 30 y.o. who presents for OB intake at 13 wks  She is accompanied by significant other during this encounter  The father of her baby (Chris Landeros) is involved in the pregnancy and is 31 years old.      Last Menstrual Period: unknown  Ultrasound: Measured 12 weeks 6 days on   Estimated Date of Delivery: 10/24/25 changed by 12 week US    Signs/Symptoms of Pregnancy  Current pregnancy symptoms: angry (\"Everything ticks her off\"), back pain, fatigue, discomfort with intercourse \"a lot of cramping all the time\"   Constipation YES, chronic   Headaches YES, twice a week   Cramping/spotting YES, a lot of cramping (thinks she may have UTI going for UA/UC today will do remainder of PN1 labs on Friday)  PICA cravings no    Diabetes-  Body mass index is 34.46 kg/m².  If patient has 1 or more, please order early 1 hour GTT  History of GDM no  BMI >35 no  History of PCOS or current metformin use (should stop for 7 days prior to 1hr GTT unless pre-existing diabetes)  no  History of LGA/macrosomic infant (4000g/9lbs) no    If patient has 2 or more, please order early 1 hour GTT  BMI>30 YES  AMA no  First degree relative with type 2 diabetes no  History of chronic HTN, hyperlipidemia, elevated A1C no  High risk race (, , ,  or ) YES    Hypertension- if you answer yes to any of the following, please order baseline preeclampsia labs (cbc, comprehensive metabolic panel, urine protein creatinine ratio, uric acid)  History of of chronic HTN no  History of gestational HTN no  History of preeclampsia, eclampsia, or HELLP syndrome YES  History of diabetes no  History of lupus,sjogrens syndrome, kidney disease no    Thyroid- if yes order TSH with reflex T4  History of thyroid disease no    Bleeding Disorder or Hx of DVT-patient or first degree relative with history of. Order the following if not done previously.   (Factor V, antithrombin " III, prothrombin gene mutation, protein C and S Ag, lupus anticoagulant, anticardiolipin, beta-2 glycoprotein)   no    OB/GYN-  History of abnormal pap smear no       Date of last pap smear 10/30/2020  History of HPV no  History of Herpes/HSV no  History of other STI (gonorrhea, chlamydia, trich) no  History of prior  YES  History of prior  no  History of  delivery prior to 36 weeks 6 days no  History of Varicella or Vaccination had vaccines  History of blood transfusion no  Ok for blood transfusion YES    Substance screening-   History of tobacco use no  Currently using tobacco no  Substance Use Screen Level (N/A, LOW, HIGH) NA    MRSA Screening-   Does the pt have a hx of MRSA? no    Immunizations:  Influenza vaccine given this season no, declined  Discussed Tdap vaccine yes  Discussed COVID Vaccine no    Genetic/MFM-  Do you or your partner have a history of any of the following in yourselves or first degree relatives?  Cystic fibrosis no  Spinal muscular atrophy no  Hemoglobinopathy/Sickle Cell/Thalassemia no  Fragile X Intellectual Disability no    If yes, discuss Carrier Screening and recommend consultation with [facility]/Genetic Counseling and place specific [facility] Referral for.    If no, discuss Carrier Screening being completed once in a lifetime as a standard of care lab test. Place orders for Cystic Fibrosis Gene Test ([id number]) and Spinal Muscular Atrophy DNA ([id number])      Appointment for Nuchal Translucency Ultrasound at [facility] scheduled for       Interview education  [facility] Pregnancy Essentials Book reviewed, discussed and attached to their AVS yes    Nurse/Family Partnership- patient may qualify no; referral placed no    Prenatal lab work scripts yes  Extra labs ordered:  1 hr gtt  PreE lab    Aspirin/Preeclampsia Screen    Risk Level Risk Factor Recommendation   LOW Prior Uncomplicated full-term delivery YES No Aspirin recommendation        MODERATE Nulliparity no Recommend low-dose  aspirin if     BMI>30 YES 2 or more moderate risk factors    Family History Preeclampsia (mother/sister) no     35yr old or greater no     Black Race, Concern for SDOH/Low Socioeconomic YES     IVF Pregnancy  no     Personal History Risks (low birth weight, prior adverse preg outcome, >10yr preg interval) no         HIGH History of Preeclampsia YES Recommend low-dose aspirin if     Multifetal gestation no 1 or more high risk factors    Chronic HTN no     Type 1 or 2 Diabetes no     Renal Disease no     Autoimmune Disease  no      Contraindications to ASA therapy:  NSAID/ ASA allergy: no  Nasal polyps: no  Asthma with history of ASA induced bronchospasm: no  Relative contraindications:  History of GI bleed: no  Active peptic ulcer disease: no  Severe hepatic dysfunction: no    Patient should be recommended to take ASA 162mg during this pregnancy from 12-36wks to lower her risk of preeclampsia: Moderate Risk- pt aware to start ASA therapy will  from the pharmacy today.          The patient has a history now or in prior pregnancy notable for:  pre-clampsia and EPDS- 8 HX of  (unsure of how she wants to deliver this time)       Details that I feel the provider should be aware of: This is a unplanned but welcomed pregnancy for Talya and her significant other Chris.  She is a previous patient to [facility]. This is her 2nd child, their son Jose Daniel (3) was born via  after a failed induction of labor had a 2 -3 day NICU stay for respiratory distress. She is overall feeling well so far, a lot of cramping no spotting and she is always angry. Patient is aware of PN1 labs and to have them completed before her next appointment. Carrier screening discussed patient declined. Blue folder was given and reviewed today.     PN1 visit scheduled. The patient was oriented to our practice, the navigator role, reviewed delivering physicians and [facility] for Delivery. All questions were answered.    Interviewed  by: Angélica Gordon RN

## 2025-04-22 NOTE — LETTER
04/22/25    Talya Finley    1995    Estimated Date of Delivery: 10/24/25        The above named patient is currently under our care for their pregnancy.          Thank you,  HELLEN Estrada   OB Nurse Navigator

## 2025-04-23 ENCOUNTER — RESULTS FOLLOW-UP (OUTPATIENT)
Dept: OBGYN CLINIC | Facility: CLINIC | Age: 30
End: 2025-04-23

## 2025-04-23 LAB — BACTERIA UR CULT: NORMAL

## 2025-04-25 ENCOUNTER — APPOINTMENT (OUTPATIENT)
Dept: LAB | Facility: HOSPITAL | Age: 30
End: 2025-04-25
Payer: COMMERCIAL

## 2025-04-25 ENCOUNTER — INITIAL PRENATAL (OUTPATIENT)
Dept: OBGYN CLINIC | Facility: CLINIC | Age: 30
End: 2025-04-25
Payer: COMMERCIAL

## 2025-04-25 VITALS — SYSTOLIC BLOOD PRESSURE: 124 MMHG | DIASTOLIC BLOOD PRESSURE: 84 MMHG | WEIGHT: 220 LBS | BODY MASS INDEX: 34.46 KG/M2

## 2025-04-25 DIAGNOSIS — Z12.4 SCREENING FOR CERVICAL CANCER: ICD-10-CM

## 2025-04-25 DIAGNOSIS — Z34.01 ENCOUNTER FOR PRENATAL CARE IN FIRST TRIMESTER OF FIRST PREGNANCY: ICD-10-CM

## 2025-04-25 DIAGNOSIS — L73.2 HIDRADENITIS SUPPURATIVA: ICD-10-CM

## 2025-04-25 DIAGNOSIS — Z11.3 SCREENING FOR STDS (SEXUALLY TRANSMITTED DISEASES): ICD-10-CM

## 2025-04-25 DIAGNOSIS — O99.212 OBESITY AFFECTING PREGNANCY IN SECOND TRIMESTER, UNSPECIFIED OBESITY TYPE: ICD-10-CM

## 2025-04-25 DIAGNOSIS — F12.90 MARIJUANA USE DURING PREGNANCY: ICD-10-CM

## 2025-04-25 DIAGNOSIS — O34.219 HISTORY OF CESAREAN DELIVERY, ANTEPARTUM: Primary | ICD-10-CM

## 2025-04-25 DIAGNOSIS — O99.320 MARIJUANA USE DURING PREGNANCY: ICD-10-CM

## 2025-04-25 DIAGNOSIS — O09.299 HX OF PREECLAMPSIA, PRIOR PREGNANCY, CURRENTLY PREGNANT, UNSPECIFIED TRIMESTER: ICD-10-CM

## 2025-04-25 LAB
SL AMB  POCT GLUCOSE, UA: NORMAL
SL AMB POCT URINE PROTEIN: NORMAL

## 2025-04-25 PROCEDURE — 81002 URINALYSIS NONAUTO W/O SCOPE: CPT

## 2025-04-25 PROCEDURE — 99213 OFFICE O/P EST LOW 20 MIN: CPT

## 2025-04-25 PROCEDURE — 87624 HPV HI-RISK TYP POOLED RSLT: CPT

## 2025-04-25 PROCEDURE — G0145 SCR C/V CYTO,THINLAYER,RESCR: HCPCS | Performed by: STUDENT IN AN ORGANIZED HEALTH CARE EDUCATION/TRAINING PROGRAM

## 2025-04-25 RX ORDER — TRIAMCINOLONE ACETONIDE 0.25 MG/G
CREAM TOPICAL 2 TIMES DAILY
Qty: 15 G | Refills: 0 | Status: SHIPPED | OUTPATIENT
Start: 2025-04-25

## 2025-04-25 NOTE — PROGRESS NOTES
Name: Talya Finley      : 1995      MRN: [id number]  Encounter Provider: Phyllsi Barnes PA-C  Encounter Date: 2025   Encounter department: [facility]  :  Assessment & Plan  Screening for STDs (sexually transmitted diseases)    Orders:    Chlamydia/GC amplified DNA by PCR    Screening for cervical cancer    Orders:    Liquid-based pap, screening    History of  delivery, antepartum    Pt would like rLTCS    Hx of preeclampsia, prior pregnancy, currently pregnant, unspecified trimester    normotensive   Continue ASA    Obesity affecting pregnancy in second trimester, unspecified obesity type    We discussed weight gain in pregnancy patient notes significant weight gain in her previous pregnancy  We discussed diet and exercise recommendations  Reviewed weight gain goals for this pregnancy: 11-20lbs    Marijuana use during pregnancy    Stopped use when she found out she was pregnant (beginning of April)    Encounter for prenatal care in first trimester of first pregnancy  Patient should call if she experiences: vaginal bleeding, change in discharge, LOF, contractions or dysuria.  Initial PNV labs ordered already, reminded patient to complete  Continue prenatal vitamin  Discussed the importance of a well balanced diet and discussed foods to avoid/limit during pregnancy  Patient feels safe at home  Childbirth classes/hospital facilities discussed   Orders:    POCT urine dip    Hidradenitis suppurativa  Clean affected area with hibiclens once a month  Kenalog cream sent to pharmacy, reviewed with patient risks of skin thinning a depigmentation with extended use  Consider follow up with dermatologist  Orders:    triamcinolone (KENALOG) 0.025 % cream; Apply topically 2 (two) times a day PRN        History of Present Illness   HPI  Talya Finley is a 30 y.o.  at 14w0d with Estimated Date of Delivery: 10/24/25 by 1st trimester US.     She denies  nausea/vomiting and bleeding  Patient endorses light cramping .     Prenatal labs: not done yet    Scheduled with [facility] 25    OB history: , hx pre-eclampsia per patient during labor. First delivery complicated by failed induction, failure to progress in first stage of labor and fetal intolerance. Patient refused SVEs, refused pitocin, refused labetalol IV pushes for SRBP, desired general anesthesia. Patient received a  with spinal, general anesthesia. Apgars 1 A1, 4 A5 baby needed 2-3 day NICU stay for respiratory distress, doing well now    GYN history: Last pap smear 10/30/2020 NILM. No history of STDs.     Past medical history: HS, she has a dermatologist, due to pregnancy she does not have many options of management and she notes a significant worsening of her symptoms. She has used triamcinolone cream PRN which has helped relieve the flare ups, she is requesting a refill to help manage this    Past surgical history: cesaren section    Social history:  Denies tobacco, alcohol, or illicit drug use. Patient stopped marijuana use when she found out she was pregnant (beginning of April)    Family history:   DVT/PE: none  Cancer: none  Heart disease: none  Stroke: maternal grandmother     Review of Systems   Constitutional:  Negative for chills, fatigue and fever.   Gastrointestinal:  Negative for abdominal pain, anal bleeding, blood in stool, constipation, diarrhea, nausea and vomiting.   Genitourinary:  Negative for difficulty urinating, dyspareunia, dysuria, frequency, hematuria, menstrual problem, pelvic pain, urgency, vaginal bleeding, vaginal discharge and vaginal pain.   Neurological:  Negative for dizziness, syncope, light-headedness and headaches.   Psychiatric/Behavioral:  Negative for dysphoric mood. The patient is not nervous/anxious.      Current Outpatient Medications on File Prior to Visit   Medication Sig Dispense Refill    aspirin (ECOTRIN LOW STRENGTH) 81 mg EC tablet Take 2  tablets (162 mg total) by mouth daily Stop at 36 weeks.  Contact your OB or MFM with any side effects.  See [email or web address].[email or web address] 60 tablet 3    Prenatal Vit-Fe Fumarate-FA (PRENATAL VITAMIN PO) Take by mouth       No current facility-administered medications on file prior to visit.      Social History     Tobacco Use    Smoking status: Never     Passive exposure: Never    Smokeless tobacco: Never   Vaping Use    Vaping status: Never Used   Substance and Sexual Activity    Alcohol use: Not Currently    Drug use: Not Currently     Types: Marijuana     Comment: last used 4/7/25 quit with confirmation of pregnancy    Sexual activity: Yes     Partners: Male     Birth control/protection: None        Objective   /84 (BP Location: Left arm, Patient Position: Sitting, Cuff Size: Standard)   Wt 99.8 kg (220 lb)   LMP  (LMP Unknown)   BMI 34.46 kg/m²      Physical Exam  Vitals and nursing note reviewed.   Constitutional:       General: She is not in acute distress.     Appearance: She is well-developed.   HENT:      Head: Normocephalic and atraumatic.   Eyes:      Extraocular Movements: Extraocular movements intact.   Cardiovascular:      Rate and Rhythm: Normal rate and regular rhythm.   Pulmonary:      Effort: Pulmonary effort is normal. No respiratory distress.      Breath sounds: Normal breath sounds.   Chest:   Breasts:     Right: No swelling, bleeding, inverted nipple, mass, nipple discharge, skin change or tenderness.      Left: No swelling, bleeding, inverted nipple, mass, nipple discharge, skin change or tenderness.      Comments: Very mild HS in the bilateral axilla  Abdominal:      Palpations: Abdomen is soft.      Tenderness: There is no abdominal tenderness.      Hernia: There is no hernia in the left inguinal area or right inguinal area.   Genitourinary:     General: Normal vulva.      Exam position: Lithotomy position.      Pubic Area: No rash.       Labia:         Right: No tenderness.          Left: No tenderness.       Urethra: No urethral pain or urethral swelling.      Vagina: No vaginal discharge, erythema, tenderness, bleeding or lesions.      Cervix: No cervical motion tenderness, discharge, friability, lesion, erythema or cervical bleeding.      Uterus: Not enlarged and not tender.       Adnexa:         Right: No mass, tenderness or fullness.          Left: No mass, tenderness or fullness.         Lymphadenopathy:      Upper Body:      Right upper body: No supraclavicular, axillary or pectoral adenopathy.      Left upper body: No supraclavicular, axillary or pectoral adenopathy.      Lower Body: No right inguinal adenopathy. No left inguinal adenopathy.   Skin:     General: Skin is warm and dry.   Neurological:      Mental Status: She is alert.   Psychiatric:         Mood and Affect: Mood normal.         Behavior: Behavior normal.       Fetal Heart Rate: 140     Phyllis Barnes PA-C

## 2025-04-25 NOTE — ASSESSMENT & PLAN NOTE
We discussed weight gain in pregnancy patient notes significant weight gain in her previous pregnancy  We discussed diet and exercise recommendations  Reviewed weight gain goals for this pregnancy: 11-20lbs

## 2025-04-25 NOTE — ASSESSMENT & PLAN NOTE
Clean affected area with hibiclens once a month  Kenalog cream sent to pharmacy, reviewed with patient risks of skin thinning a depigmentation with extended use  Consider follow up with dermatologist  Orders:    triamcinolone (KENALOG) 0.025 % cream; Apply topically 2 (two) times a day PRN

## 2025-04-30 ENCOUNTER — TELEPHONE (OUTPATIENT)
Dept: OBGYN CLINIC | Facility: CLINIC | Age: 30
End: 2025-04-30

## 2025-04-30 ENCOUNTER — TELEPHONE (OUTPATIENT)
Dept: LAB | Facility: HOSPITAL | Age: 30
End: 2025-04-30

## 2025-04-30 DIAGNOSIS — Z34.90 PRENATAL CARE, ANTEPARTUM, UNSPECIFIED GRAVIDITY: Primary | ICD-10-CM

## 2025-04-30 NOTE — TELEPHONE ENCOUNTER
"Spoke to patient and let her know about the message from the lab. GC/CT specimen needs to be recollected. Patient asked what happened at the lab that they could not run it, unfortunately I gave her all the information I had and was relaying the message from the lab that there was an \"accident\". I let her know that Phyllis put in a new order for her to do the GC/CT at the lab by urine. Patient seemed upset and stated that she did not think the provider ever swabbed her for that as she only saw a white brush from the pap smear. I informed the patient that that is the brush that providers use for the thin-prep vials and it can be used for pap smears but also STD screening at the same time. The patient then stated that she went to the lab to get blood work but they told her she needed to come back 5/5/25. She asked me to check if the orders were deleted. I told her that the orders are still active in her chart and the urine was added for the GC/CT so whenever she gets her blood work completed she can do the urine for the GC/CT that day. Patient verbalized understanding.   "

## 2025-04-30 NOTE — TELEPHONE ENCOUNTER
Lab error, unable to run GC/chlamydia on ThinPrep and was advised to have a recollection.  GC/chlamydia urine test ordered to be collected at lab

## 2025-05-01 LAB
LAB AP GYN PRIMARY INTERPRETATION: ABNORMAL
Lab: ABNORMAL
PATH INTERP SPEC-IMP: ABNORMAL

## 2025-05-01 PROCEDURE — G0124 SCREEN C/V THIN LAYER BY MD: HCPCS | Performed by: STUDENT IN AN ORGANIZED HEALTH CARE EDUCATION/TRAINING PROGRAM

## 2025-05-06 ENCOUNTER — APPOINTMENT (OUTPATIENT)
Dept: LAB | Facility: HOSPITAL | Age: 30
End: 2025-05-06
Payer: COMMERCIAL

## 2025-05-06 DIAGNOSIS — Z34.90 PRENATAL CARE, ANTEPARTUM, UNSPECIFIED GRAVIDITY: ICD-10-CM

## 2025-05-06 DIAGNOSIS — Z34.81 PRENATAL CARE, SUBSEQUENT PREGNANCY, FIRST TRIMESTER: ICD-10-CM

## 2025-05-06 LAB
ABO GROUP BLD: NORMAL
ALBUMIN SERPL BCG-MCNC: 4.1 G/DL (ref 3.5–5)
ALP SERPL-CCNC: 54 U/L (ref 34–104)
ALT SERPL W P-5'-P-CCNC: 11 U/L (ref 7–52)
ANION GAP SERPL CALCULATED.3IONS-SCNC: 7 MMOL/L (ref 4–13)
AST SERPL W P-5'-P-CCNC: 15 U/L (ref 13–39)
BASOPHILS # BLD AUTO: 0.05 THOUSANDS/ÂΜL (ref 0–0.1)
BASOPHILS NFR BLD AUTO: 1 % (ref 0–1)
BILIRUB SERPL-MCNC: 0.24 MG/DL (ref 0.2–1)
BLD GP AB SCN SERPL QL: NEGATIVE
BUN SERPL-MCNC: 8 MG/DL (ref 5–25)
CALCIUM SERPL-MCNC: 9.2 MG/DL (ref 8.4–10.2)
CHLORIDE SERPL-SCNC: 103 MMOL/L (ref 96–108)
CO2 SERPL-SCNC: 25 MMOL/L (ref 21–32)
CREAT SERPL-MCNC: 0.64 MG/DL (ref 0.6–1.3)
CREAT UR-MCNC: 201.8 MG/DL
EOSINOPHIL # BLD AUTO: 0.33 THOUSAND/ÂΜL (ref 0–0.61)
EOSINOPHIL NFR BLD AUTO: 4 % (ref 0–6)
ERYTHROCYTE [DISTWIDTH] IN BLOOD BY AUTOMATED COUNT: 12.4 % (ref 11.6–15.1)
GFR SERPL CREATININE-BSD FRML MDRD: 121 ML/MIN/1.73SQ M
GLUCOSE 1H P 50 G GLC PO SERPL-MCNC: 63 MG/DL (ref 70–134)
GLUCOSE SERPL-MCNC: 64 MG/DL (ref 65–140)
HCT VFR BLD AUTO: 39.9 % (ref 34.8–46.1)
HGB BLD-MCNC: 13.4 G/DL (ref 11.5–15.4)
HPV HR 12 DNA CVX QL NAA+PROBE: NEGATIVE
HPV16 DNA CVX QL NAA+PROBE: NEGATIVE
HPV18 DNA CVX QL NAA+PROBE: NEGATIVE
IMM GRANULOCYTES # BLD AUTO: 0.03 THOUSAND/UL (ref 0–0.2)
IMM GRANULOCYTES NFR BLD AUTO: 0 % (ref 0–2)
LYMPHOCYTES # BLD AUTO: 2.43 THOUSANDS/ÂΜL (ref 0.6–4.47)
LYMPHOCYTES NFR BLD AUTO: 29 % (ref 14–44)
MCH RBC QN AUTO: 29.1 PG (ref 26.8–34.3)
MCHC RBC AUTO-ENTMCNC: 33.6 G/DL (ref 31.4–37.4)
MCV RBC AUTO: 87 FL (ref 82–98)
MONOCYTES # BLD AUTO: 0.6 THOUSAND/ÂΜL (ref 0.17–1.22)
MONOCYTES NFR BLD AUTO: 7 % (ref 4–12)
NEUTROPHILS # BLD AUTO: 4.94 THOUSANDS/ÂΜL (ref 1.85–7.62)
NEUTS SEG NFR BLD AUTO: 59 % (ref 43–75)
NRBC BLD AUTO-RTO: 0 /100 WBCS
PLATELET # BLD AUTO: 250 THOUSANDS/UL (ref 149–390)
PMV BLD AUTO: 10.5 FL (ref 8.9–12.7)
POTASSIUM SERPL-SCNC: 3.3 MMOL/L (ref 3.5–5.3)
PROT SERPL-MCNC: 7.8 G/DL (ref 6.4–8.4)
PROT UR-MCNC: 16.2 MG/DL
PROT/CREAT UR: 0.1 MG/G{CREAT}
RBC # BLD AUTO: 4.61 MILLION/UL (ref 3.81–5.12)
RH BLD: POSITIVE
RUBV IGG SERPL IA-ACNC: 50.7 IU/ML
SODIUM SERPL-SCNC: 135 MMOL/L (ref 135–147)
SPECIMEN EXPIRATION DATE: NORMAL
URATE SERPL-MCNC: 3.7 MG/DL (ref 2–7.5)
WBC # BLD AUTO: 8.38 THOUSAND/UL (ref 4.31–10.16)

## 2025-05-06 PROCEDURE — 87340 HEPATITIS B SURFACE AG IA: CPT

## 2025-05-06 PROCEDURE — 86850 RBC ANTIBODY SCREEN: CPT

## 2025-05-06 PROCEDURE — 87389 HIV-1 AG W/HIV-1&-2 AB AG IA: CPT

## 2025-05-06 PROCEDURE — 80053 COMPREHEN METABOLIC PANEL: CPT

## 2025-05-06 PROCEDURE — 86803 HEPATITIS C AB TEST: CPT

## 2025-05-06 PROCEDURE — 82950 GLUCOSE TEST: CPT

## 2025-05-06 PROCEDURE — 87491 CHLMYD TRACH DNA AMP PROBE: CPT

## 2025-05-06 PROCEDURE — 84156 ASSAY OF PROTEIN URINE: CPT

## 2025-05-06 PROCEDURE — 87591 N.GONORRHOEAE DNA AMP PROB: CPT

## 2025-05-06 PROCEDURE — 86900 BLOOD TYPING SEROLOGIC ABO: CPT

## 2025-05-06 PROCEDURE — 86762 RUBELLA ANTIBODY: CPT

## 2025-05-06 PROCEDURE — 85025 COMPLETE CBC W/AUTO DIFF WBC: CPT

## 2025-05-06 PROCEDURE — 86901 BLOOD TYPING SEROLOGIC RH(D): CPT

## 2025-05-06 PROCEDURE — 84550 ASSAY OF BLOOD/URIC ACID: CPT

## 2025-05-06 PROCEDURE — 82570 ASSAY OF URINE CREATININE: CPT

## 2025-05-06 PROCEDURE — 86706 HEP B SURFACE ANTIBODY: CPT

## 2025-05-06 PROCEDURE — 86780 TREPONEMA PALLIDUM: CPT

## 2025-05-07 ENCOUNTER — RESULTS FOLLOW-UP (OUTPATIENT)
Age: 30
End: 2025-05-07

## 2025-05-07 DIAGNOSIS — N76.0 BV (BACTERIAL VAGINOSIS): Primary | ICD-10-CM

## 2025-05-07 DIAGNOSIS — B96.89 BV (BACTERIAL VAGINOSIS): Primary | ICD-10-CM

## 2025-05-07 LAB
C TRACH DNA SPEC QL NAA+PROBE: NEGATIVE
HBV SURFACE AB SER-ACNC: 40 MIU/ML
HBV SURFACE AG SER QL: NORMAL
HCV AB SER QL: NORMAL
HIV 1+2 AB+HIV1 P24 AG SERPL QL IA: NORMAL
N GONORRHOEA DNA SPEC QL NAA+PROBE: NEGATIVE
TREPONEMA PALLIDUM IGG+IGM AB [PRESENCE] IN SERUM OR PLASMA BY IMMUNOASSAY: NORMAL

## 2025-05-07 RX ORDER — METRONIDAZOLE 7.5 MG/G
1 GEL VAGINAL
Qty: 70 G | Refills: 0 | Status: SHIPPED | OUTPATIENT
Start: 2025-05-07 | End: 2025-05-12

## 2025-05-21 ENCOUNTER — TELEPHONE (OUTPATIENT)
Dept: OBGYN CLINIC | Facility: CLINIC | Age: 30
End: 2025-05-21

## 2025-05-21 NOTE — PROGRESS NOTES
Prenatal Visit  Prenatal Visit  Name: Talya Finley      : 1995      MRN: [id number]  Encounter Provider: Phyllis Barnes PA-C  Encounter Date: 2025   Encounter department: [facility]    :  Assessment & Plan  17 weeks gestation of pregnancy     Blood Type:B+  Pap  ASCUS/HPV negative  GC/CT -negative  PN1 Labs- UTD  28 Week Labs-     Blue folder- has  Yellow folder-     NIPS-   AFP-      TDAP -      Delivery consent-  Breast pump -   Pediatrician -  L&D forms-     Perineal massage -  GBS swab -   IOL -     Obesity affecting pregnancy in second trimester, unspecified obesity type    TWG: -6.4 ounces    Hx of preeclampsia, prior pregnancy, currently pregnant, unspecified trimester    Normotensive and asymptomatic  Continue ASA    History of  delivery, antepartum    Patient would like repeatLTCS    Prenatal care, second trimester     17w6d  Reviewed aron reyes ctx and s/s PTL.   Precautions reviewed to call for - Vaginal bldg, LOF, abnormal d/c or > 4 ctx in an hour.   FM not consistent until around 28 wks.   Advised the patient to call if she notices soaking of her underwear unassociated with sleep/night sweats or if she experiences unusual discharge, itching or odor.    Questions have been answered to her satisfaction.    Nasal congestion    We reviewed the medications safe in pregnancy she advised her to review the cold/allergy section for management of cold symptoms.  We discussed pregnancy rhinitis and use of nasal saline flushes  If she develops further cold symptoms that do not improve with over-the-counter remedies consider following up with PCP or urgent care      Subjective:   History of Present Illness   History of Present Illness     Talya is a 30 y.o.  17w6d here for Routine Prenatal Visit (17w6d)    She denies any cramping, LOF/unusual discharge or VB.  She thinks she has noted fetal movement.     Patient feels like she has a  "cold coming on she notices ear congestion and nose congestion.    Patient notes that over the past week she has woken up to her underwear wet.  She thinks that the moisture is due to sweat since she has been having some \" weird dreams\" that has woken her up in a sweat.  The underwear moisture does not occur outside of these episodes or during the day.  She denies any unusual appearing discharge, itching or odor.  She denies any gushes of fluid throughout the day.      Objective:  Objective   /78 (BP Location: Left arm, Patient Position: Sitting, Cuff Size: Standard)   Wt 99.6 kg (219 lb 9.6 oz)   LMP  (LMP Unknown)   BMI 34.39 kg/m²     Pregravid Weight/BMI: 99.8 kg (220 lb) (BMI 34.45)  Current Weight: 99.6 kg (219 lb 9.6 oz)   Total Weight Gain: -0.181 kg (-6.4 oz)     Fetal Heart Rate: 155        Physical Exam  Constitutional:       Appearance: Normal appearance.   HENT:      Head: Normocephalic and atraumatic.      Mouth/Throat:      Mouth: Mucous membranes are moist.      Pharynx: No oropharyngeal exudate or posterior oropharyngeal erythema.     Eyes:      Extraocular Movements: Extraocular movements intact.     Pulmonary:      Effort: Pulmonary effort is normal.     Musculoskeletal:         General: Normal range of motion.     Neurological:      Mental Status: She is alert.     Skin:     General: Skin is warm and dry.     Psychiatric:         Mood and Affect: Mood normal.         Behavior: Behavior normal.       Phyllis Barnes PA-C  5/22/2025  "

## 2025-05-22 ENCOUNTER — ROUTINE PRENATAL (OUTPATIENT)
Dept: OBGYN CLINIC | Facility: CLINIC | Age: 30
End: 2025-05-22

## 2025-05-22 VITALS — DIASTOLIC BLOOD PRESSURE: 78 MMHG | BODY MASS INDEX: 34.39 KG/M2 | WEIGHT: 219.6 LBS | SYSTOLIC BLOOD PRESSURE: 118 MMHG

## 2025-05-22 DIAGNOSIS — R09.81 NASAL CONGESTION: ICD-10-CM

## 2025-05-22 DIAGNOSIS — O99.212 OBESITY AFFECTING PREGNANCY IN SECOND TRIMESTER, UNSPECIFIED OBESITY TYPE: ICD-10-CM

## 2025-05-22 DIAGNOSIS — Z3A.17 17 WEEKS GESTATION OF PREGNANCY: Primary | ICD-10-CM

## 2025-05-22 DIAGNOSIS — O34.219 HISTORY OF CESAREAN DELIVERY, ANTEPARTUM: ICD-10-CM

## 2025-05-22 DIAGNOSIS — Z34.92 PRENATAL CARE, SECOND TRIMESTER: ICD-10-CM

## 2025-05-22 DIAGNOSIS — O09.299 HX OF PREECLAMPSIA, PRIOR PREGNANCY, CURRENTLY PREGNANT, UNSPECIFIED TRIMESTER: ICD-10-CM

## 2025-05-22 LAB
SL AMB  POCT GLUCOSE, UA: ABNORMAL
SL AMB POCT URINE PROTEIN: ABNORMAL

## 2025-06-12 ENCOUNTER — ANCILLARY PROCEDURE (OUTPATIENT)
Dept: PERINATAL CARE | Facility: OTHER | Age: 30
End: 2025-06-12
Attending: OBSTETRICS & GYNECOLOGY
Payer: COMMERCIAL

## 2025-06-12 ENCOUNTER — ROUTINE PRENATAL (OUTPATIENT)
Dept: PERINATAL CARE | Facility: OTHER | Age: 30
End: 2025-06-12
Payer: COMMERCIAL

## 2025-06-12 VITALS
BODY MASS INDEX: 35.79 KG/M2 | HEART RATE: 84 BPM | SYSTOLIC BLOOD PRESSURE: 118 MMHG | OXYGEN SATURATION: 99 % | HEIGHT: 67 IN | WEIGHT: 228 LBS | DIASTOLIC BLOOD PRESSURE: 80 MMHG

## 2025-06-12 DIAGNOSIS — E66.89 OTHER OBESITY AFFECTING PREGNANCY IN SECOND TRIMESTER: Primary | ICD-10-CM

## 2025-06-12 DIAGNOSIS — O34.219 HISTORY OF CESAREAN DELIVERY, ANTEPARTUM: ICD-10-CM

## 2025-06-12 DIAGNOSIS — O99.212 OTHER OBESITY AFFECTING PREGNANCY IN SECOND TRIMESTER: Primary | ICD-10-CM

## 2025-06-12 DIAGNOSIS — Z3A.20 20 WEEKS GESTATION OF PREGNANCY: ICD-10-CM

## 2025-06-12 DIAGNOSIS — O09.299 HX OF PREECLAMPSIA, PRIOR PREGNANCY, CURRENTLY PREGNANT, UNSPECIFIED TRIMESTER: ICD-10-CM

## 2025-06-12 PROCEDURE — 76811 OB US DETAILED SNGL FETUS: CPT | Performed by: OBSTETRICS & GYNECOLOGY

## 2025-06-12 PROCEDURE — 99213 OFFICE O/P EST LOW 20 MIN: CPT | Performed by: OBSTETRICS & GYNECOLOGY

## 2025-06-12 PROCEDURE — 76817 TRANSVAGINAL US OBSTETRIC: CPT | Performed by: OBSTETRICS & GYNECOLOGY

## 2025-06-12 NOTE — ASSESSMENT & PLAN NOTE
Placenta is anterior. There is no sonographic evidence of increased risk for placenta accreta spectrum (PAS). Ultrasound cannot rule out all cases of PAS, the sensitivity of ultrasound for diagnosing PAS is 89-92%.

## 2025-06-12 NOTE — ASSESSMENT & PLAN NOTE
She had a normal early 1 hour on 5/6.     Follow up for growth and completion of missed anatomy advised at 32 weeks

## 2025-06-12 NOTE — PROGRESS NOTES
"[facility] return visit     HPI: Talya Finley is a 30 y.o.  at 20w6d who presents today for anatomic survey and cervical length screening ultrasound at our [facility]   See ultrasound report under \"imaging\" tab.         She denies any complaints today.     NIPT and early 1 hour GTT was reviewed prior to today's visit and were normal    Vitals:    25 1406   BP: 118/80   Pulse: 84   SpO2: 99%     Problem List Items Addressed This Visit       Hx of preeclampsia, prior pregnancy, currently pregnant, unspecified trimester    Obesity affecting pregnancy in second trimester - Primary    She had a normal early 1 hour on .     Follow up for growth and completion of missed anatomy advised at 32 weeks           History of  delivery, antepartum    Placenta is anterior. There is no sonographic evidence of increased risk for placenta accreta spectrum (PAS). Ultrasound cannot rule out all cases of PAS, the sensitivity of ultrasound for diagnosing PAS is 89-92%.          20 weeks gestation of pregnancy    She had a low risk aneuploidy screening with NIPT which was reviewed prior to today's visit.     We reviewed the reassuring anatomic survey and cervical length today. See today's [facility] US report under \"imaging\" tab. Although encouraging, even a normal-appearing ultrasound cannot exclude all malformations, or the possibility of a genetic syndrome.     No further ultrasounds were scheduled at this time. Please refer her back to our office as clinically indicated.                   Summary of recommended follow up:  Growth and MA at 32 weeks       Please see today's [facility] ultrasound report documented separately under \"imaging\" tab in Epic.    Please don't hesitate to contact our office with any concerns or questions.    -Minda Heller MD  "

## 2025-06-12 NOTE — ASSESSMENT & PLAN NOTE
"She had a low risk aneuploidy screening with NIPT which was reviewed prior to today's visit.     We reviewed the reassuring anatomic survey and cervical length today. See today's [facility] US report under \"imaging\" tab. Although encouraging, even a normal-appearing ultrasound cannot exclude all malformations, or the possibility of a genetic syndrome.     No further ultrasounds were scheduled at this time. Please refer her back to our office as clinically indicated.         "

## 2025-06-15 PROBLEM — Z3A.21 21 WEEKS GESTATION OF PREGNANCY: Status: ACTIVE | Noted: 2025-06-12

## 2025-06-16 ENCOUNTER — TELEPHONE (OUTPATIENT)
Age: 30
End: 2025-06-16

## 2025-06-16 NOTE — TELEPHONE ENCOUNTER
Patient missed her 23 weeks OB visit that was scheduled today at 11:45 am. She called and stated she did not want to reschedule the visit and she wants to just come to her 27 weeks OB visit. She said she will not even be in [place]. She said that she just had an ultrasound done on Thursday and if that is fine and she is good until her next visit, She wants to know if she is okay until 27 week OB visit? Please call patient if she needs to be seen prior to her 27 week.

## 2025-06-18 DIAGNOSIS — O09.299 HX OF PREECLAMPSIA, PRIOR PREGNANCY, CURRENTLY PREGNANT, UNSPECIFIED TRIMESTER: ICD-10-CM

## 2025-06-18 DIAGNOSIS — O99.212 OBESITY AFFECTING PREGNANCY IN SECOND TRIMESTER, UNSPECIFIED OBESITY TYPE: ICD-10-CM

## 2025-06-18 RX ORDER — ASPIRIN 81 MG/1
162 TABLET ORAL DAILY
Qty: 60 TABLET | Refills: 3 | Status: SHIPPED | OUTPATIENT
Start: 2025-06-18

## 2025-07-11 PROBLEM — Z3A.25 25 WEEKS GESTATION OF PREGNANCY: Status: ACTIVE | Noted: 2025-06-12

## 2025-07-11 NOTE — PATIENT INSTRUCTIONS
Patient Education     Pregnancy - The Sixth Month   About this topic   It is important for you to learn how to take care of yourself to help you have a healthy baby and safe delivery. It is good to have health care throughout your pregnancy.  The sixth month of your pregnancy starts around week 24 and lasts through week 28. By knowing how far along you are, you can learn what is normal for this stage of your pregnancy and plan for what is next.  General   Your body   During the sixth month of your pregnancy, here are some things you can expect.  You may:  Start to gain a little more weight. It is normal to gain about 10 to 15 pounds (4.5 to 7 kg) total in your first 6 months.  Have problems like back pain, dry eyes, or aching hands and wrists  Itching of palms, abdomen, or soles of your feet  Swelling of ankles, fingers, or face.  Need to urinate more frequently.  Have problems with hemorrhoids. Be sure to eat plenty of fresh fruits and vegetables to increase the fiber in your diet. Drink plenty of water to help keep your stools soft.  Continue having Andre Romero contractions. You may feel your belly squeezing or getting tight.  Have a glucose test to test for gestational diabetes.  Have more headaches. Talk to your doctor about how to help with them.  See stretch marks on your belly, breasts, or legs. Stay active to try and keep good muscle tone.  See an increase in vaginal discharge. Talk to your doctor about what to expect.  Your baby's growth and development:  Your baby looks like a very small  baby.  They are able to live outside of your womb but would need a lot of help breathing, eating, and staying warm in an intensive care unit.  Your baby has times of being awake and times of being asleep. The baby’s eyelids are able to open and close.  They move more and have hiccups.  Your baby is about 14 inches (36 cm) long and weighs about 2 pounds (900 gm). Your baby is about the size of an eggplant.  Baby  may be able to hear voices.  Things to Think About   Avoid alcohol, drugs, tobacco products, and second hand smoke  Eat small meals throughout the day instead of larger meals.  Avoid spicy, greasy, or fatty foods.  Avoid lying down or bending over for around 3 hours after eating  Warm baths are fine to help you relax. Avoid hot tubs while you are pregnant.  Avoid straining when having bowel movements.  Learning how to care for your baby. You may want to sign up for classes on how to take care of a .  Are you planning on going back to work after having your baby? It’s not too early to think about .  Consider starting your birth plan if you have specific needs or wishes for delivery.  When do I need to call the doctor?   Contractions every 10 minutes or more often that do not go away with drinking water or position changes  Low, dull back pain that does not go away  Pressure in your pelvis that feels like your baby is pushing down  A gush or constant trickle of watery or bloody fluid leaking from your vagina  Cramps in your lower belly that come and go or are constant  Change in your baby's movement  Fever of 100.4°F (38°C) or higher  Little to no movement felt by baby in 2 hours. Your baby should be moving at least 10 times every 2 hours.  Headache that does not go away; blurry vision; seeing spots or halos; increase in swelling in your hands, feet, or face; and pain under your ribs on the right side  Vaginal bleeding with or without pain  After a car accident, fall, or any trauma to your belly  Having thoughts of harming yourself or others, or do not feel safe at home  Last Reviewed Date   2020  Consumer Information Use and Disclaimer   This generalized information is a limited summary of diagnosis, treatment, and/or medication information. It is not meant to be comprehensive and should be used as a tool to help the user understand and/or assess potential diagnostic and treatment options. It does  NOT include all information about conditions, treatments, medications, side effects, or risks that may apply to a specific patient. It is not intended to be medical advice or a substitute for the medical advice, diagnosis, or treatment of a health care provider based on the health care provider's examination and assessment of a patient’s specific and unique circumstances. Patients must speak with a health care provider for complete information about their health, medical questions, and treatment options, including any risks or benefits regarding use of medications. This information does not endorse any treatments or medications as safe, effective, or approved for treating a specific patient. [employer] and its affiliates disclaim any warranty or liability relating to this information or the use thereof. The use of this information is governed by the Terms of Use, available at https://www.woltersTransfercaruwer.com/en/know/clinical-effectiveness-terms   Copyright   Copyright © 2024 [employer], [employer] and its affiliates and/or licensors. All rights reserved.

## 2025-07-14 ENCOUNTER — ROUTINE PRENATAL (OUTPATIENT)
Dept: OBGYN CLINIC | Facility: CLINIC | Age: 30
End: 2025-07-14
Payer: COMMERCIAL

## 2025-07-14 VITALS — WEIGHT: 235 LBS | DIASTOLIC BLOOD PRESSURE: 78 MMHG | SYSTOLIC BLOOD PRESSURE: 114 MMHG | BODY MASS INDEX: 36.81 KG/M2

## 2025-07-14 DIAGNOSIS — Z34.92 PRENATAL CARE, SECOND TRIMESTER: Primary | ICD-10-CM

## 2025-07-14 DIAGNOSIS — Z3A.25 25 WEEKS GESTATION OF PREGNANCY: ICD-10-CM

## 2025-07-14 DIAGNOSIS — O34.219 HISTORY OF CESAREAN DELIVERY, ANTEPARTUM: ICD-10-CM

## 2025-07-14 DIAGNOSIS — O09.299 HX OF PREECLAMPSIA, PRIOR PREGNANCY, CURRENTLY PREGNANT, UNSPECIFIED TRIMESTER: ICD-10-CM

## 2025-07-14 DIAGNOSIS — O99.212 OBESITY AFFECTING PREGNANCY IN SECOND TRIMESTER, UNSPECIFIED OBESITY TYPE: ICD-10-CM

## 2025-07-14 DIAGNOSIS — K64.9 HEMORRHOIDS, UNSPECIFIED HEMORRHOID TYPE: ICD-10-CM

## 2025-07-14 DIAGNOSIS — K59.00 CONSTIPATION, UNSPECIFIED CONSTIPATION TYPE: ICD-10-CM

## 2025-07-14 PROCEDURE — 99214 OFFICE O/P EST MOD 30 MIN: CPT | Performed by: NURSE PRACTITIONER

## 2025-07-14 RX ORDER — DOCUSATE SODIUM 100 MG/1
100 CAPSULE, LIQUID FILLED ORAL 2 TIMES DAILY
Qty: 60 CAPSULE | Refills: 2 | Status: SHIPPED | OUTPATIENT
Start: 2025-07-14

## 2025-07-14 RX ORDER — HYDROCORTISONE ACETATE 25 MG/1
25 SUPPOSITORY RECTAL 2 TIMES DAILY PRN
Qty: 12 SUPPOSITORY | Refills: 0 | Status: SHIPPED | OUTPATIENT
Start: 2025-07-14 | End: 2025-07-21

## 2025-07-14 NOTE — PROGRESS NOTES
Patient is here for prenatal ob visit today.  GA: 25w3d  BILL: 10/24/2025  GP:   Blood type: B positive  Denies LOF, VB, CTX  +FM    Labs utd except AFP.  28 week labs ordered.    Blue folder given at intake    C/o constipated x1 month. Also needs refill for Kenalog cream. Wants to know if 1hr GTT should be 3hr instead, 1hr done on 2025

## 2025-07-14 NOTE — PROGRESS NOTES
Name: Talya Finley      : 1995      MRN: [id number]  Encounter Provider: DARSHANA Rivera  Encounter Date: 2025   Encounter department: [facility]  :  Assessment & Plan  Prenatal care, second trimester  Feels well.   Denies LOF/CTX/VB.   Discussed fetal awareness.   No concerns.   Overview charting updated today.    Orders:    Anemia Panel w/Reflex, OB; Future    CBC and differential; Future    Glucose, 1H PG; Future    RPR-Syphilis Screening (Total Syphilis IGG/IGM); Future    25 weeks gestation of pregnancy  UTD on PNC  28-week labs ordered  Repeat fetal growth on  at 32 weeks         Hx of preeclampsia, prior pregnancy, currently pregnant, unspecified trimester  Normotensive today  Continue asa       Obesity affecting pregnancy in second trimester, unspecified obesity type  TWG 6.804 kg (15 lb)  Encouraged to stay active and follow mindful eating       History of  delivery, antepartum  Thinking about TOLAC  vs repeat        Constipation, unspecified constipation type  Encouraged to increase fluids, fruits and veggies  Encouraged light exercise  Orders:    docusate sodium (COLACE) 100 mg capsule; Take 1 capsule (100 mg total) by mouth 2 (two) times a day    Hemorrhoids, unspecified hemorrhoid type    Orders:    hydrocortisone (ANUSOL-HC) 25 mg suppository; Insert 1 suppository (25 mg total) into the rectum 2 (two) times a day as needed for hemorrhoids for up to 7 days

## 2025-07-30 ENCOUNTER — APPOINTMENT (OUTPATIENT)
Dept: LAB | Facility: HOSPITAL | Age: 30
End: 2025-07-30
Payer: COMMERCIAL

## 2025-07-30 DIAGNOSIS — Z3A.17 17 WEEKS GESTATION OF PREGNANCY: ICD-10-CM

## 2025-07-30 DIAGNOSIS — Z34.92 PRENATAL CARE, SECOND TRIMESTER: ICD-10-CM

## 2025-07-30 LAB
BASOPHILS # BLD AUTO: 0.02 THOUSANDS/ÂΜL (ref 0–0.1)
BASOPHILS NFR BLD AUTO: 0 % (ref 0–1)
EOSINOPHIL # BLD AUTO: 0.16 THOUSAND/ÂΜL (ref 0–0.61)
EOSINOPHIL NFR BLD AUTO: 3 % (ref 0–6)
ERYTHROCYTE [DISTWIDTH] IN BLOOD BY AUTOMATED COUNT: 12.8 % (ref 11.6–15.1)
GLUCOSE 1H P 50 G GLC PO SERPL-MCNC: 89 MG/DL (ref 70–134)
HCT VFR BLD AUTO: 36.8 % (ref 34.8–46.1)
HGB BLD-MCNC: 12.2 G/DL (ref 11.5–15.4)
IMM GRANULOCYTES # BLD AUTO: 0.04 THOUSAND/UL (ref 0–0.2)
IMM GRANULOCYTES NFR BLD AUTO: 1 % (ref 0–2)
LYMPHOCYTES # BLD AUTO: 1.37 THOUSANDS/ÂΜL (ref 0.6–4.47)
LYMPHOCYTES NFR BLD AUTO: 22 % (ref 14–44)
MCH RBC QN AUTO: 29.5 PG (ref 26.8–34.3)
MCHC RBC AUTO-ENTMCNC: 33.2 G/DL (ref 31.4–37.4)
MCV RBC AUTO: 89 FL (ref 82–98)
MONOCYTES # BLD AUTO: 0.36 THOUSAND/ÂΜL (ref 0.17–1.22)
MONOCYTES NFR BLD AUTO: 6 % (ref 4–12)
NEUTROPHILS # BLD AUTO: 4.39 THOUSANDS/ÂΜL (ref 1.85–7.62)
NEUTS SEG NFR BLD AUTO: 68 % (ref 43–75)
NRBC BLD AUTO-RTO: 0 /100 WBCS
PLATELET # BLD AUTO: 222 THOUSANDS/UL (ref 149–390)
PMV BLD AUTO: 10.7 FL (ref 8.9–12.7)
RBC # BLD AUTO: 4.14 MILLION/UL (ref 3.81–5.12)
WBC # BLD AUTO: 6.34 THOUSAND/UL (ref 4.31–10.16)

## 2025-07-30 PROCEDURE — 82105 ALPHA-FETOPROTEIN SERUM: CPT

## 2025-07-30 PROCEDURE — 82950 GLUCOSE TEST: CPT

## 2025-07-30 PROCEDURE — 85025 COMPLETE CBC W/AUTO DIFF WBC: CPT

## 2025-07-30 PROCEDURE — 86780 TREPONEMA PALLIDUM: CPT

## 2025-07-30 PROCEDURE — 36415 COLL VENOUS BLD VENIPUNCTURE: CPT

## 2025-07-31 LAB — TREPONEMA PALLIDUM IGG+IGM AB [PRESENCE] IN SERUM OR PLASMA BY IMMUNOASSAY: NORMAL

## 2025-08-02 LAB
2ND TRIMESTER 4 SCREEN SERPL-IMP: NORMAL
AFP ADJ MOM SERPL: NORMAL
AFP INTERP AMN-IMP: NORMAL
AFP INTERP SERPL-IMP: NORMAL
AFP INTERP SERPL-IMP: NORMAL
AFP SERPL-MCNC: 67.4 NG/ML
AGE AT DELIVERY: 28.7 YR
GA METHOD: NORMAL
GA: 27.7 WEEKS
IDDM PATIENT QL: NO
MULTIPLE PREGNANCY: NO
NEURAL TUBE DEFECT RISK FETUS: NORMAL %

## 2025-08-12 ENCOUNTER — ROUTINE PRENATAL (OUTPATIENT)
Dept: OBGYN CLINIC | Facility: CLINIC | Age: 30
End: 2025-08-12
Payer: COMMERCIAL

## 2025-08-12 PROBLEM — Z3A.29 29 WEEKS GESTATION OF PREGNANCY: Status: ACTIVE | Noted: 2025-06-12

## 2025-08-25 PROBLEM — O99.213 OBESITY AFFECTING PREGNANCY IN THIRD TRIMESTER: Status: ACTIVE | Noted: 2025-04-16

## 2025-08-25 PROBLEM — Z3A.32 32 WEEKS GESTATION OF PREGNANCY: Status: ACTIVE | Noted: 2025-06-12

## 2025-08-25 PROBLEM — Z3A.31 31 WEEKS GESTATION OF PREGNANCY: Status: ACTIVE | Noted: 2025-06-12

## (undated) DEVICE — SUT MONOCRYL 0 CTX 36 IN Y398H

## (undated) DEVICE — GLOVE INDICATOR PI UNDERGLOVE SZ 6.5 BLUE

## (undated) DEVICE — TELFA NON-ADHERENT ABSORBENT DRESSING: Brand: TELFA

## (undated) DEVICE — SUT PDS II 0 CTB-1 27 IN ZB340

## (undated) DEVICE — CHLORAPREP HI-LITE 26ML ORANGE

## (undated) DEVICE — GLOVE PI ULTRA TOUCH SZ.6.5

## (undated) DEVICE — 3M™ STERI-STRIP™ REINFORCED ADHESIVE SKIN CLOSURES, R1542, 1/4 IN X 1-1/2 IN (6 MM X 38 MM), 6 STRIPS/ENVELOPE: Brand: 3M™ STERI-STRIP™

## (undated) DEVICE — MEDI-VAC YANKAUER SUCTION HANDLE W/STRAIGHT TIP & CONTROL VENT: Brand: CARDINAL HEALTH

## (undated) DEVICE — ABDOMINAL PAD: Brand: DERMACEA

## (undated) DEVICE — SPONGE SCRUB 4 PCT CHLORHEXIDINE

## (undated) DEVICE — SUT VICRYL 0 CTX 36 IN J978H

## (undated) DEVICE — SUT MONOCRYL 4-0 PS-2 18 IN Y496G

## (undated) DEVICE — SKIN MARKER DUAL TIP WITH RULER CAP, FLEXIBLE RULER AND LABELS: Brand: DEVON

## (undated) DEVICE — Device

## (undated) DEVICE — GAUZE SPONGES,16 PLY: Brand: CURITY

## (undated) DEVICE — SUT PLAIN 2-0 CTX 27 IN 872H

## (undated) DEVICE — PACK C-SECTION PBDS